# Patient Record
Sex: MALE | Race: WHITE | NOT HISPANIC OR LATINO | URBAN - METROPOLITAN AREA
[De-identification: names, ages, dates, MRNs, and addresses within clinical notes are randomized per-mention and may not be internally consistent; named-entity substitution may affect disease eponyms.]

---

## 2021-12-16 ENCOUNTER — EMERGENCY (EMERGENCY)
Facility: HOSPITAL | Age: 86
LOS: 0 days | Discharge: HOME | End: 2021-12-17
Attending: EMERGENCY MEDICINE | Admitting: EMERGENCY MEDICINE
Payer: MEDICARE

## 2021-12-16 VITALS
TEMPERATURE: 98 F | DIASTOLIC BLOOD PRESSURE: 60 MMHG | SYSTOLIC BLOOD PRESSURE: 119 MMHG | RESPIRATION RATE: 19 BRPM | OXYGEN SATURATION: 97 % | HEART RATE: 64 BPM

## 2021-12-16 DIAGNOSIS — M54.50 LOW BACK PAIN, UNSPECIFIED: ICD-10-CM

## 2021-12-16 DIAGNOSIS — N40.0 BENIGN PROSTATIC HYPERPLASIA WITHOUT LOWER URINARY TRACT SYMPTOMS: ICD-10-CM
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
## 2021-12-16 DIAGNOSIS — G30.9 ALZHEIMER'S DISEASE, UNSPECIFIED: ICD-10-CM

## 2021-12-16 DIAGNOSIS — F02.80 DEMENTIA IN OTHER DISEASES CLASSIFIED ELSEWHERE, UNSPECIFIED SEVERITY, WITHOUT BEHAVIORAL DISTURBANCE, PSYCHOTIC DISTURBANCE, MOOD DISTURBANCE, AND ANXIETY: ICD-10-CM
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
## 2021-12-16 LAB
ALBUMIN SERPL ELPH-MCNC: 3.9 G/DL — SIGNIFICANT CHANGE UP (ref 3.5–5.2)
ALP SERPL-CCNC: 63 U/L — SIGNIFICANT CHANGE UP (ref 30–115)
ALT FLD-CCNC: 10 U/L — SIGNIFICANT CHANGE UP (ref 0–41)
ANION GAP SERPL CALC-SCNC: 9 MMOL/L — SIGNIFICANT CHANGE UP (ref 7–14)
AST SERPL-CCNC: 15 U/L — SIGNIFICANT CHANGE UP (ref 0–41)
BASOPHILS # BLD AUTO: 0.02 K/UL — SIGNIFICANT CHANGE UP (ref 0–0.2)
BASOPHILS NFR BLD AUTO: 0.3 % — SIGNIFICANT CHANGE UP (ref 0–1)
BILIRUB SERPL-MCNC: <0.2 MG/DL — SIGNIFICANT CHANGE UP (ref 0.2–1.2)
BUN SERPL-MCNC: 25 MG/DL — HIGH (ref 10–20)
CALCIUM SERPL-MCNC: 8.7 MG/DL — SIGNIFICANT CHANGE UP (ref 8.5–10.1)
CHLORIDE SERPL-SCNC: 106 MMOL/L — SIGNIFICANT CHANGE UP (ref 98–110)
CO2 SERPL-SCNC: 20 MMOL/L — SIGNIFICANT CHANGE UP (ref 17–32)
CREAT SERPL-MCNC: 1 MG/DL — SIGNIFICANT CHANGE UP (ref 0.7–1.5)
EOSINOPHIL # BLD AUTO: 0.34 K/UL — SIGNIFICANT CHANGE UP (ref 0–0.7)
EOSINOPHIL NFR BLD AUTO: 5.1 % — SIGNIFICANT CHANGE UP (ref 0–8)
GLUCOSE SERPL-MCNC: 110 MG/DL — HIGH (ref 70–99)
HCT VFR BLD CALC: 32 % — LOW (ref 42–52)
HGB BLD-MCNC: 10.4 G/DL — LOW (ref 14–18)
IMM GRANULOCYTES NFR BLD AUTO: 0.2 % — SIGNIFICANT CHANGE UP (ref 0.1–0.3)
LYMPHOCYTES # BLD AUTO: 1.83 K/UL — SIGNIFICANT CHANGE UP (ref 1.2–3.4)
LYMPHOCYTES # BLD AUTO: 27.6 % — SIGNIFICANT CHANGE UP (ref 20.5–51.1)
MCHC RBC-ENTMCNC: 30.7 PG — SIGNIFICANT CHANGE UP (ref 27–31)
MCHC RBC-ENTMCNC: 32.5 G/DL — SIGNIFICANT CHANGE UP (ref 32–37)
MCV RBC AUTO: 94.4 FL — HIGH (ref 80–94)
MONOCYTES # BLD AUTO: 0.83 K/UL — HIGH (ref 0.1–0.6)
MONOCYTES NFR BLD AUTO: 12.5 % — HIGH (ref 1.7–9.3)
NEUTROPHILS # BLD AUTO: 3.6 K/UL — SIGNIFICANT CHANGE UP (ref 1.4–6.5)
NEUTROPHILS NFR BLD AUTO: 54.3 % — SIGNIFICANT CHANGE UP (ref 42.2–75.2)
NRBC # BLD: 0 /100 WBCS — SIGNIFICANT CHANGE UP (ref 0–0)
PLATELET # BLD AUTO: 176 K/UL — SIGNIFICANT CHANGE UP (ref 130–400)
POTASSIUM SERPL-MCNC: 3.9 MMOL/L — SIGNIFICANT CHANGE UP (ref 3.5–5)
POTASSIUM SERPL-SCNC: 3.9 MMOL/L — SIGNIFICANT CHANGE UP (ref 3.5–5)
PROT SERPL-MCNC: 6.3 G/DL — SIGNIFICANT CHANGE UP (ref 6–8)
RBC # BLD: 3.39 M/UL — LOW (ref 4.7–6.1)
RBC # FLD: 12.8 % — SIGNIFICANT CHANGE UP (ref 11.5–14.5)
SODIUM SERPL-SCNC: 135 MMOL/L — SIGNIFICANT CHANGE UP (ref 135–146)
WBC # BLD: 6.63 K/UL — SIGNIFICANT CHANGE UP (ref 4.8–10.8)
WBC # FLD AUTO: 6.63 K/UL — SIGNIFICANT CHANGE UP (ref 4.8–10.8)

## 2021-12-16 PROCEDURE — 99284 EMERGENCY DEPT VISIT MOD MDM: CPT

## 2021-12-16 PROCEDURE — 93010 ELECTROCARDIOGRAM REPORT: CPT

## 2021-12-16 PROCEDURE — 72170 X-RAY EXAM OF PELVIS: CPT | Mod: 26

## 2021-12-16 PROCEDURE — 71045 X-RAY EXAM CHEST 1 VIEW: CPT | Mod: 26

## 2021-12-16 NOTE — ED ADULT NURSE NOTE - OBJECTIVE STATEMENT
pt presents with daughter, pmhx alzheimers with more confusion in last 24 hrs and bph. daughter reports unwitnessed fall per dads report, found outside. pt complaining of back pain.

## 2021-12-16 NOTE — ED ADULT TRIAGE NOTE - BP NONINVASIVE DIASTOLIC (MM HG)
Palliative Care Progress Note Patient: Terell Calhoun MRN: [id number]  SSN: [id number] YOB: 1944  Age: 76 y.o. Sex: male Assessment/Plan: Chief Complaint/Interval History: pt alert with some confusion. Appears comfortable this am 
 
Principal Diagnosis: · Altered Mental Status R41.82 Additional Diagnoses: · Debility, Unspecified  R53.81 
· Delirium  F05 · Counseling, Encounter for Medical Advice  Z71.9 
· Encounter for Palliative Care  Z51.5 Palliative Performance Scale (PPS) PPS: 30 Medical Decision Making:  
Reviewed and summarized labs and imaging. Met with pt son and spouse at bedside. Reviewed current medical situation and plans to begin treatment for myeloma. Family decided towards DNR yesterday. They understand severity of situation and are relying on their ulisses and family for support. They remain hopeful that pt will improve. I discussed case with  who will follow up with family as well Will discuss findings with members of the interdisciplinary team.   
 
More than 50% of this 25 minute visit was spent counseling and coordination of care as outlined above. Subjective:  
 
Review of Systems negative with the exception of as noted above Objective:  
 
Visit Vitals BP 97/56 Pulse 79 Temp 97.4 °F (36.3 °C) Resp 16 Ht 6' (1.829 m) Wt 207 lb (93.9 kg) SpO2 98% BMI 28.07 kg/m² Physical Exam: 
 
General:  No acute distress. Eyes:  Conjunctivae/corneas clear Nose: Nares normal. Septum midline. Neck: Supple, symmetrical, trachea midline, no JVD Lungs:   Clear to auscultation bilaterally, unlabored Heart:  Regular rate and rhythm, no murmur Abdomen:   Soft, non-tender, non-distended Extremities: Normal, atraumatic, no cyanosis or edema Skin: Skin color, texture, turgor normal. No rash or lesions. Neurologic: Nonfocal  
Psych: Resting. Has been confused some but more alert Signed By: Katelyn Borja MD   
 October 23, 2019 60

## 2021-12-17 VITALS — HEART RATE: 55 BPM | DIASTOLIC BLOOD PRESSURE: 74 MMHG | SYSTOLIC BLOOD PRESSURE: 116 MMHG

## 2021-12-17 PROCEDURE — 70450 CT HEAD/BRAIN W/O DYE: CPT | Mod: 26,MA

## 2021-12-17 PROCEDURE — 72125 CT NECK SPINE W/O DYE: CPT | Mod: 26,MA

## 2021-12-17 PROCEDURE — 71260 CT THORAX DX C+: CPT | Mod: 26,MA

## 2021-12-17 PROCEDURE — 74177 CT ABD & PELVIS W/CONTRAST: CPT | Mod: 26,MA

## 2021-12-17 RX ORDER — ACETAMINOPHEN 500 MG
650 TABLET ORAL ONCE
Refills: 0 | Status: COMPLETED | OUTPATIENT
Start: 2021-12-17 | End: 2021-12-17

## 2021-12-17 NOTE — ED PROVIDER NOTE - NS ED ROS FT
Review of Systems    Constitutional: (-) fever   Eyes/ENT: (-) vision changes  Cardiovascular: (-) chest pain, (-) syncope (-) palpitations  Respiratory: (-) cough, (-) shortness of breath  Gastrointestinal: (-) vomiting, (-) diarrhea (-) abdominal pain  Genitourinary:  (-) dysuria   Musculoskeletal: (-) neck pain, (+) back pain, (-) leg pain/swelling  Integumentary: (-) rash, (-) edema  Neurological: (-) headache  Hematologic: (-) easy bruising

## 2021-12-17 NOTE — ED PROVIDER NOTE - NSFOLLOWUPINSTRUCTIONS_ED_ALL_ED_FT
Please follow up with your PCP in 1-3 days if you continue to have back pain.  Return to the ED for severe pain or inability to urinate or back pain + fever.    Back Pain    Back pain is very common in adults. The cause of back pain is rarely dangerous and the pain often gets better over time. The cause of your back pain may not be known and may include strain of muscles or ligaments, degeneration of the spinal disks, or arthritis. Occasionally the pain may radiate down your leg(s). Over-the-counter medicines to reduce pain and inflammation are often the most helpful. Stretching and remaining active frequently helps the healing process.     SEEK IMMEDIATE MEDICAL CARE IF YOU HAVE ANY OF THE FOLLOWING SYMPTOMS: bowel or bladder control problems, unusual weakness or numbness in your arms or legs, nausea or vomiting, abdominal pain, fever, dizziness/lightheadedness.

## 2021-12-17 NOTE — ED PROVIDER NOTE - PATIENT PORTAL LINK FT
You can access the FollowMyHealth Patient Portal offered by [facility] by registering at the following website: [email or web address]. By joining [employer]’s FollowMyHealth portal, you will also be able to view your health information using other applications (apps) compatible with our system.

## 2021-12-17 NOTE — ED PROVIDER NOTE - OBJECTIVE STATEMENT
87 y/o M with PMH BPH, alzheimer's presents with daughter for mild constant throbbing L lower back pain x hrs. +worse with movement, better with rest.   typically ambulates with a cane,  tonight left house, unattended, which daughter immediately saw on camera and he came right back in, and so she was concerned that when he was outside for those few minutes he fell because he was complaining of back pain.  denies saddle anesthesia, bowel/bladder dysfunction, difficulty ambulating, paraesthesias, direct trauma, hx weakness, fever, urinary sxs.

## 2021-12-17 NOTE — ED PROVIDER NOTE - PHYSICAL EXAMINATION
PHYSICAL EXAM:    GENERAL: Alert, appears stated age, well appearing, non-toxic  SKIN: Warm, pink and dry. MMM.   HEAD: NC, AT, no step offs   EYE: Normal lids/conjunctiva  ENT: Normal hearing, patent oropharynx  NECK: +supple. No meningismus, or JVD, +Trachea midline. no tenderness/step offs.   Pulm: Bilateral BS, normal resp effort, no wheezes, stridor, or retractions  CV: RRR, no M/R/G, 2+and = radial pulses  Abd: soft, non-tender, non-distended  Mskel: no erythema, cyanosis, edema. no calf tenderness. +from of b/l UE/le. no spinal TTP. +L paralumbar TTP.   Neuro: AAOx2, which is his baseline. normal gait.

## 2021-12-17 NOTE — ED PROVIDER NOTE - CLINICAL SUMMARY MEDICAL DECISION MAKING FREE TEXT BOX
86yM Alzheimer's p/w back pain x 1d with ?hx unwitnessed fall.  Exam w/o focal neuro deficits or signs of traumatic injury.  Labs reassuring.  Imaging w/o acute traumatic injury nor explanation for his back pain.  Recommend supportive care, urgent o/p pcp f/u, return precautions.

## 2021-12-17 NOTE — ED PROVIDER NOTE - ATTENDING CONTRIBUTION TO CARE
86yM Alzheimer's p/w back pain - pt wandered briefly out of the house and later reported unwitnessed fall.  He continues to complain of low back pain this afternoon.  No fevers, IVDU, saddle anesthesia or urinary retention.

## 2021-12-17 NOTE — ED PROVIDER NOTE - PROGRESS NOTE DETAILS
CHASITY TIRADO: Reviewed all results and necessity for follow up. Counseled on red flags and to return for them.  Patient appears well on discharge.

## 2022-05-19 NOTE — ED PROVIDER NOTE - CCCP TRG CHIEF CMPLNT
Placed future lab orders    FAITH Crawford    Dear Dr. Bowling;    Your ferritin is coming up but still low normal. I would stay on iron for now and we can recheck in about 3 months. I placed future lab orders today.    FAITH Crawford    
fall

## 2022-12-28 ENCOUNTER — INPATIENT (INPATIENT)
Facility: HOSPITAL | Age: 87
LOS: 11 days | Discharge: HOME | End: 2023-01-09
Attending: INTERNAL MEDICINE | Admitting: INTERNAL MEDICINE
Payer: MEDICARE

## 2022-12-28 VITALS
WEIGHT: 203.93 LBS | HEART RATE: 99 BPM | DIASTOLIC BLOOD PRESSURE: 84 MMHG | TEMPERATURE: 97 F | HEIGHT: 70 IN | SYSTOLIC BLOOD PRESSURE: 121 MMHG | OXYGEN SATURATION: 95 % | RESPIRATION RATE: 18 BRPM

## 2022-12-28 DIAGNOSIS — Z98.890 OTHER SPECIFIED POSTPROCEDURAL STATES: Chronic | ICD-10-CM

## 2022-12-28 DIAGNOSIS — Z98.49 CATARACT EXTRACTION STATUS, UNSPECIFIED EYE: Chronic | ICD-10-CM

## 2022-12-28 LAB
ALBUMIN SERPL ELPH-MCNC: 3.8 G/DL — SIGNIFICANT CHANGE UP (ref 3.5–5.2)
ALP SERPL-CCNC: 69 U/L — SIGNIFICANT CHANGE UP (ref 30–115)
ALT FLD-CCNC: 28 U/L — SIGNIFICANT CHANGE UP (ref 0–41)
ANION GAP SERPL CALC-SCNC: 13 MMOL/L — SIGNIFICANT CHANGE UP (ref 7–14)
APPEARANCE UR: CLEAR — SIGNIFICANT CHANGE UP
APTT BLD: 29.8 SEC — SIGNIFICANT CHANGE UP (ref 27–39.2)
AST SERPL-CCNC: 26 U/L — SIGNIFICANT CHANGE UP (ref 0–41)
BACTERIA # UR AUTO: ABNORMAL
BASOPHILS # BLD AUTO: 0.02 K/UL — SIGNIFICANT CHANGE UP (ref 0–0.2)
BASOPHILS NFR BLD AUTO: 0.3 % — SIGNIFICANT CHANGE UP (ref 0–1)
BILIRUB SERPL-MCNC: 1 MG/DL — SIGNIFICANT CHANGE UP (ref 0.2–1.2)
BILIRUB UR-MCNC: ABNORMAL
BUN SERPL-MCNC: 27 MG/DL — HIGH (ref 10–20)
CALCIUM SERPL-MCNC: 9.1 MG/DL — SIGNIFICANT CHANGE UP (ref 8.4–10.5)
CHLORIDE SERPL-SCNC: 106 MMOL/L — SIGNIFICANT CHANGE UP (ref 98–110)
CO2 SERPL-SCNC: 21 MMOL/L — SIGNIFICANT CHANGE UP (ref 17–32)
COD CRY URNS QL: ABNORMAL
COLOR SPEC: YELLOW — SIGNIFICANT CHANGE UP
CREAT SERPL-MCNC: 1.3 MG/DL — SIGNIFICANT CHANGE UP (ref 0.7–1.5)
DIFF PNL FLD: ABNORMAL
EGFR: 53 ML/MIN/1.73M2 — LOW
EOSINOPHIL # BLD AUTO: 0.18 K/UL — SIGNIFICANT CHANGE UP (ref 0–0.7)
EOSINOPHIL NFR BLD AUTO: 2.6 % — SIGNIFICANT CHANGE UP (ref 0–8)
EPI CELLS # UR: ABNORMAL /HPF
GLUCOSE SERPL-MCNC: 103 MG/DL — HIGH (ref 70–99)
GLUCOSE UR QL: NEGATIVE MG/DL — SIGNIFICANT CHANGE UP
HCT VFR BLD CALC: 35.7 % — LOW (ref 42–52)
HGB BLD-MCNC: 11.9 G/DL — LOW (ref 14–18)
IMM GRANULOCYTES NFR BLD AUTO: 0.3 % — SIGNIFICANT CHANGE UP (ref 0.1–0.3)
INR BLD: 1.3 RATIO — SIGNIFICANT CHANGE UP (ref 0.65–1.3)
KETONES UR-MCNC: 15
LEUKOCYTE ESTERASE UR-ACNC: NEGATIVE — SIGNIFICANT CHANGE UP
LYMPHOCYTES # BLD AUTO: 1.46 K/UL — SIGNIFICANT CHANGE UP (ref 1.2–3.4)
LYMPHOCYTES # BLD AUTO: 21.3 % — SIGNIFICANT CHANGE UP (ref 20.5–51.1)
MAGNESIUM SERPL-MCNC: 1.9 MG/DL — SIGNIFICANT CHANGE UP (ref 1.8–2.4)
MCHC RBC-ENTMCNC: 30.2 PG — SIGNIFICANT CHANGE UP (ref 27–31)
MCHC RBC-ENTMCNC: 33.3 G/DL — SIGNIFICANT CHANGE UP (ref 32–37)
MCV RBC AUTO: 90.6 FL — SIGNIFICANT CHANGE UP (ref 80–94)
MONOCYTES # BLD AUTO: 0.8 K/UL — HIGH (ref 0.1–0.6)
MONOCYTES NFR BLD AUTO: 11.7 % — HIGH (ref 1.7–9.3)
NEUTROPHILS # BLD AUTO: 4.37 K/UL — SIGNIFICANT CHANGE UP (ref 1.4–6.5)
NEUTROPHILS NFR BLD AUTO: 63.8 % — SIGNIFICANT CHANGE UP (ref 42.2–75.2)
NITRITE UR-MCNC: NEGATIVE — SIGNIFICANT CHANGE UP
NRBC # BLD: 0 /100 WBCS — SIGNIFICANT CHANGE UP (ref 0–0)
NT-PROBNP SERPL-SCNC: 5407 PG/ML — HIGH (ref 0–300)
PH UR: 5 — SIGNIFICANT CHANGE UP (ref 5–8)
PLATELET # BLD AUTO: 176 K/UL — SIGNIFICANT CHANGE UP (ref 130–400)
POTASSIUM SERPL-MCNC: 4.1 MMOL/L — SIGNIFICANT CHANGE UP (ref 3.5–5)
POTASSIUM SERPL-SCNC: 4.1 MMOL/L — SIGNIFICANT CHANGE UP (ref 3.5–5)
PROT SERPL-MCNC: 6.9 G/DL — SIGNIFICANT CHANGE UP (ref 6–8)
PROT UR-MCNC: NEGATIVE MG/DL — SIGNIFICANT CHANGE UP
PROTHROM AB SERPL-ACNC: 14.9 SEC — HIGH (ref 9.95–12.87)
RBC # BLD: 3.94 M/UL — LOW (ref 4.7–6.1)
RBC # FLD: 13.8 % — SIGNIFICANT CHANGE UP (ref 11.5–14.5)
RBC CASTS # UR COMP ASSIST: ABNORMAL /HPF
SARS-COV-2 RNA SPEC QL NAA+PROBE: SIGNIFICANT CHANGE UP
SODIUM SERPL-SCNC: 140 MMOL/L — SIGNIFICANT CHANGE UP (ref 135–146)
SP GR SPEC: >=1.03 (ref 1.01–1.03)
TROPONIN T SERPL-MCNC: 0.01 NG/ML — SIGNIFICANT CHANGE UP
UROBILINOGEN FLD QL: 1 MG/DL
WBC # BLD: 6.85 K/UL — SIGNIFICANT CHANGE UP (ref 4.8–10.8)
WBC # FLD AUTO: 6.85 K/UL — SIGNIFICANT CHANGE UP (ref 4.8–10.8)
WBC UR QL: SIGNIFICANT CHANGE UP /HPF

## 2022-12-28 PROCEDURE — 99222 1ST HOSP IP/OBS MODERATE 55: CPT | Mod: AI

## 2022-12-28 PROCEDURE — 71045 X-RAY EXAM CHEST 1 VIEW: CPT | Mod: 26

## 2022-12-28 PROCEDURE — 70450 CT HEAD/BRAIN W/O DYE: CPT | Mod: 26,MA

## 2022-12-28 PROCEDURE — 93010 ELECTROCARDIOGRAM REPORT: CPT

## 2022-12-28 PROCEDURE — 99285 EMERGENCY DEPT VISIT HI MDM: CPT | Mod: FS

## 2022-12-28 PROCEDURE — 76770 US EXAM ABDO BACK WALL COMP: CPT | Mod: 26

## 2022-12-28 RX ORDER — TAMSULOSIN HYDROCHLORIDE 0.4 MG/1
0.4 CAPSULE ORAL AT BEDTIME
Refills: 0 | Status: DISCONTINUED | OUTPATIENT
Start: 2022-12-28 | End: 2022-12-29

## 2022-12-28 RX ORDER — MEMANTINE HYDROCHLORIDE 10 MG/1
10 TABLET ORAL
Refills: 0 | Status: DISCONTINUED | OUTPATIENT
Start: 2022-12-28 | End: 2023-01-09

## 2022-12-28 RX ORDER — ACETAMINOPHEN 500 MG
650 TABLET ORAL EVERY 6 HOURS
Refills: 0 | Status: DISCONTINUED | OUTPATIENT
Start: 2022-12-28 | End: 2023-01-09

## 2022-12-28 RX ORDER — QUETIAPINE FUMARATE 200 MG/1
50 TABLET, FILM COATED ORAL
Refills: 0 | Status: DISCONTINUED | OUTPATIENT
Start: 2022-12-28 | End: 2022-12-29

## 2022-12-28 RX ORDER — FUROSEMIDE 40 MG
20 TABLET ORAL DAILY
Refills: 0 | Status: DISCONTINUED | OUTPATIENT
Start: 2022-12-28 | End: 2023-01-02

## 2022-12-28 RX ORDER — ONDANSETRON 8 MG/1
4 TABLET, FILM COATED ORAL EVERY 8 HOURS
Refills: 0 | Status: DISCONTINUED | OUTPATIENT
Start: 2022-12-28 | End: 2023-01-09

## 2022-12-28 RX ORDER — ENOXAPARIN SODIUM 100 MG/ML
40 INJECTION SUBCUTANEOUS EVERY 12 HOURS
Refills: 0 | Status: DISCONTINUED | OUTPATIENT
Start: 2022-12-28 | End: 2022-12-29

## 2022-12-28 RX ORDER — FINASTERIDE 5 MG/1
5 TABLET, FILM COATED ORAL DAILY
Refills: 0 | Status: DISCONTINUED | OUTPATIENT
Start: 2022-12-28 | End: 2023-01-09

## 2022-12-28 RX ORDER — LATANOPROST 0.05 MG/ML
1 SOLUTION/ DROPS OPHTHALMIC; TOPICAL AT BEDTIME
Refills: 0 | Status: DISCONTINUED | OUTPATIENT
Start: 2022-12-28 | End: 2023-01-09

## 2022-12-28 RX ORDER — ENOXAPARIN SODIUM 100 MG/ML
100 INJECTION SUBCUTANEOUS ONCE
Refills: 0 | Status: COMPLETED | OUTPATIENT
Start: 2022-12-28 | End: 2022-12-28

## 2022-12-28 RX ORDER — LANOLIN ALCOHOL/MO/W.PET/CERES
3 CREAM (GRAM) TOPICAL AT BEDTIME
Refills: 0 | Status: DISCONTINUED | OUTPATIENT
Start: 2022-12-28 | End: 2023-01-09

## 2022-12-28 RX ADMIN — ENOXAPARIN SODIUM 100 MILLIGRAM(S): 100 INJECTION SUBCUTANEOUS at 21:52

## 2022-12-28 RX ADMIN — TAMSULOSIN HYDROCHLORIDE 0.4 MILLIGRAM(S): 0.4 CAPSULE ORAL at 22:13

## 2022-12-28 RX ADMIN — LATANOPROST 1 DROP(S): 0.05 SOLUTION/ DROPS OPHTHALMIC; TOPICAL at 22:10

## 2022-12-28 RX ADMIN — MEMANTINE HYDROCHLORIDE 10 MILLIGRAM(S): 10 TABLET ORAL at 21:52

## 2022-12-28 NOTE — ED PROVIDER NOTE - PHYSICAL EXAMINATION
Physical Exam    Vital Signs: I have reviewed the initial vital signs.  Constitutional: appears stated age, no acute distress  Eyes: Conjunctiva pink, Sclera clear  Cardiovascular: S1 and S2, tachycardia, irregular rhythm, well-perfused extremities, radial pulses equal and 2+, pedal pulses 2+ and equal  Respiratory: unlabored respiratory effort, clear to auscultation bilaterally no wheezing, rales and rhonchi  Gastrointestinal: soft, non-tender abdomen, no pulsatile mass, normal bowl sounds  Musculoskeletal: supple neck, no lower extremity edema, no midline tenderness  Integumentary: warm, dry, no rash  Neurologic: awake, alert, nvi

## 2022-12-28 NOTE — ED PROVIDER NOTE - NS ED ATTENDING STATEMENT MOD
This was a shared visit with the [facility]. I reviewed and verified the documentation and independently performed the documented:

## 2022-12-28 NOTE — ED ADULT NURSE NOTE - NSICDXPASTMEDICALHX_GEN_ALL_CORE_FT
PAST MEDICAL HISTORY:  Alzheimer disease      PAST MEDICAL HISTORY:  Alzheimer disease     Enlarged prostate     Glaucoma     Hyperlipidemia

## 2022-12-28 NOTE — H&P ADULT - NSHPPHYSICALEXAM_GEN_ALL_CORE
GENERAL:  86y/o Male NAD, resting comfortably.  HEAD:  Atraumatic, Normocephalic  EYES: EOMI, PERRLA, conjunctiva and sclera clear  NECK: Supple, No JVD, no cervical lymphadenopathy, non-tender  CHEST/LUNG: Clear to auscultation bilaterally; No wheeze, rhonchi, or rales  HEART: Regular rate and rhythm; S1&S2  ABDOMEN: Soft, Nontender, Nondistended x 4 quadrants; Bowel sounds present  EXTREMITIES:   Peripheral Pulses Present, No clubbing, no cyanosis. edema, no calf tenderness  PSYCH: Alert, cooperative, appropriate  NEUROLOGY: WNL  SKIN: WNL GENERAL:  88y/o Male NAD, resting comfortably.  HEAD:  Atraumatic, Normocephalic  EYES: EOMI, PERRLA, conjunctiva and sclera clear  NECK: Supple, No JVD, no cervical lymphadenopathy, non-tender  CHEST/LUNG: Clear to auscultation bilaterally; No wheeze, rhonchi, or rales  HEART: irregularly irregular rate and rhythm, S1&S2 present  ABDOMEN: Soft, Nontender, Nondistended x 4 quadrants; Bowel sounds present  EXTREMITIES:   Peripheral Pulses Present, No clubbing, no cyanosis. edema, no calf tenderness  PSYCH: Alert, cooperative, appropriate  NEUROLOGY: A&O X0, confused however alert.   SKIN: WNL

## 2022-12-28 NOTE — H&P ADULT - ASSESSMENT
87-year-old male accompanied by his daughter c/o poor po intake, low urine output and leg swelling. Pt with a past medical history of Alzheimer's disease, BPH, hyperlipidemia, presents emerged department for weakness.  In addition  daughter states he  had a fall today hit back of head, also notes worsening lower extremity edema.  Daughter states patient has worsening weakness today.      DX new onset of Afib:  tele  cardiology  anti coag/ lovenox Q 12  CE  AM EKG     R/O CHF vs transient leg swelling  lasix    urinary retention:  straight cath    continue home meds    Prophylaxis Gi/VTE 87-year-old male accompanied by his daughter c/o poor po intake, low urine output and leg swelling. Pt with a past medical history of Alzheimer's disease, BPH, hyperlipidemia, presents emerged department for weakness.  In addition  daughter states he  had a fall today hit back of head, also notes worsening lower extremity edema.  Daughter states patient has worsening weakness today.      #new onset of Afib:  tele  [id number]: 2   anti coag/ lovenox Q 12  Cardiology consult pending  f/u TSH  CE  AM EKG     #R/O CHF vs transient leg swelling  lasix  f/u echo    #Hx of BPH  #hx of urinary retention  cont flomax   bladder US does not show retention  measure input and output      continue home meds      Daughter who is the health care proxy requests pt to be DNR/DNI with limited intervention   Daughter agrees with anticoagulation, risks were discussed at length including complications with bleeding especially with hx of falls, daughter agrees with anticoagulation and verbalized understanding of the risks.      Prophylaxis Gi/VTE 87-year-old male accompanied by his daughter c/o poor po intake, low urine output and leg swelling. Pt with a past medical history of Alzheimer's disease, BPH, hyperlipidemia, presents emerged department for weakness.  In addition  daughter states he  had a fall today hit back of head, also notes worsening lower extremity edema.  Daughter states patient has worsening weakness today.      #new onset of Afib:  tele  [id number]: 2   anti coag/ lovenox Q 12  Cardiology consult pending  f/u TSH  CE  AM EKG    #Debility possibly secondary to worsening dementia  #frequent falls  f/u B12, TSH, RPR, Ammonia   PT  cont Namenda      #R/O CHF vs transient leg swelling  lasix  f/u echo    #Hx of BPH  #hx of urinary retention  cont flomax   bladder US does not show retention  measure input and output      continue home meds      Daughter who is the health care proxy requests pt to be DNR/DNI with limited intervention   Daughter agrees with anticoagulation, risks were discussed at length including complications with bleeding especially with hx of falls, daughter agrees with anticoagulation and verbalized understanding of the risks.      Prophylaxis Gi/VTE 87-year-old male accompanied by his daughter c/o poor po intake, low urine output and leg swelling. Pt with a past medical history of Alzheimer's disease, BPH, hyperlipidemia, presents emerged department for weakness.  In addition  daughter states he  had a fall today hit back of head, also notes worsening lower extremity edema.  Daughter states patient has worsening weakness today.      #new onset of Afib:  tele  [id number]: 2   anti coag/ lovenox Q 12  Cardiology consult pending  f/u TSH  CE  AM EKG  start metoprolol 25mg BID and adjust as needed     #Debility possibly secondary to worsening dementia  #frequent falls  f/u B12, TSH, RPR, Ammonia   PT  cont Namenda   fall risk protocol      #R/O CHF vs transient leg swelling  lasix  f/u echo    #Hx of BPH  #hx of urinary retention  cont flomax   bladder US does not show retention  measure input and output      continue home meds      Daughter who is the health care proxy requests pt to be DNR/DNI with limited intervention   Daughter agrees with anticoagulation, risks were discussed at length including complications with bleeding especially with hx of falls, daughter agrees with anticoagulation and verbalized understanding of the risks.      Prophylaxis Gi/VTE 87-year-old male accompanied by his daughter c/o poor po intake, low urine output and leg swelling. Pt with a past medical history of Alzheimer's disease, BPH, hyperlipidemia, presents emerged department for weakness.  In addition  daughter states he  had a fall today hit back of head, also notes worsening lower extremity edema.  Daughter states patient has worsening weakness today.      #new onset of Afib:  tele  [id number]: 2   anti coag/ lovenox Q 12  Cardiology consult pending  f/u TSH  CE  AM EKG  start metoprolol 25mg BID and adjust as needed     #Debility possibly secondary to worsening dementia  #frequent falls  f/u B12, TSH, RPR, Ammonia   PT  cont Namenda   fall risk protocol      #R/O CHF vs transient leg swelling  lasix  f/u echo    #Hx of BPH  #hx of urinary retention  cont flomax / finasteride   bladder US does not show retention  measure input and output      continue home meds      Daughter who is the health care proxy requests pt to be DNR/DNI with limited intervention   Daughter agrees with anticoagulation, risks were discussed at length including complications with bleeding especially with hx of falls, daughter agrees with anticoagulation and verbalized understanding of the risks.      Prophylaxis Gi/VTE

## 2022-12-28 NOTE — ED PROVIDER NOTE - OBJECTIVE STATEMENT
87-year-old male, past medical history of Alzheimer's disease, BPH, hyperlipidemia, presents emerged department for weakness.  Per daughter had a fall today hit back of head, also notes worsening lower extremity edema.  Daughter states patient has been more weak and is seeking higher level of care recently.

## 2022-12-28 NOTE — ED PROVIDER NOTE - CLINICAL SUMMARY MEDICAL DECISION MAKING FREE TEXT BOX
87-year-old male with history of Alzheimer's dementia, hyperlipidemia presents to the ER for gradually worsening diffuse weakness, lower extremity edema to bilateral legs and mechanical fall.  His daughter reports that he fell onto his head, denies anticoagulation, LOC and has been mentating at baseline.  Denies chest pain, shortness of breath, fevers, chills, vomiting, diarrhea, bloody or melanotic stool.  Vitals noted, triage note reviewed.  EKG shows A. fib with RVR (rate 110s).  Labs and imaging personally reviewed.  [id number] of 2.  Patient was given anticoagulation and admitted for further management and supportive care.  Patient and family updated the bedside.

## 2022-12-28 NOTE — H&P ADULT - NSHPLABSRESULTS_GEN_ALL_CORE
ICU Vital Signs Last 24 Hrs  T(C): 35.7 (28 Dec 2022 19:16), Max: 36.1 (28 Dec 2022 15:07)  T(F): 96.2 (28 Dec 2022 19:16), Max: 96.9 (28 Dec 2022 15:07)  HR: 98 (28 Dec 2022 19:16) (98 - 99)  BP: 114/89 (28 Dec 2022 19:16) (114/89 - 121/84)  BP(mean): --  ABP: --  ABP(mean): --  RR: 18 (28 Dec 2022 19:16) (18 - 18)  SpO2: 96% (28 Dec 2022 19:16) (95% - 96%)    O2 Parameters below as of 28 Dec 2022 19:16  Patient On (Oxygen Delivery Method): room air                                11.9   6.85  )-----------( 176      ( 28 Dec 2022 16:25 )             35.7       12-28    140  |  106  |  27<H>  ----------------------------<  103<H>  4.1   |  21  |  1.3    Ca    9.1      28 Dec 2022 16:25  Mg     1.9     12-28    TPro  6.9  /  Alb  3.8  /  TBili  1.0  /  DBili  x   /  AST  26  /  ALT  28  /  AlkPhos  69  12-28              Urinalysis Basic - ( 28 Dec 2022 16:36 )    Color: Yellow / Appearance: Clear / SG: >=1.030 / pH: x  Gluc: x / Ketone: 15  / Bili: Small / Urobili: 1.0 mg/dL   Blood: x / Protein: Negative mg/dL / Nitrite: Negative   Leuk Esterase: Negative / RBC: 3-5 /HPF / WBC 1-2 /HPF   Sq Epi: x / Non Sq Epi: Occasional /HPF / Bacteria: Few        PT/INR - ( 28 Dec 2022 19:32 )   PT: 14.90 sec;   INR: 1.30 ratio         PTT - ( 28 Dec 2022 19:32 )  PTT:29.8 sec    Lactate Trend      CARDIAC MARKERS ( 28 Dec 2022 16:25 )  x     / 0.01 ng/mL / x     / x     / x            CAPILLARY BLOOD GLUCOSE          ct head : no ICH, no acute finding.

## 2022-12-28 NOTE — ED PROVIDER NOTE - CARE PLAN
Principal Discharge DX:	Atrial fibrillation  Secondary Diagnosis:	Weakness  Secondary Diagnosis:	Swelling of lower extremity   1

## 2022-12-28 NOTE — H&P ADULT - HISTORY OF PRESENT ILLNESS
87-year-old male accompanied by his daughter c/o poor po intake, low urine output and leg swelling. Pt with a past medical history of Alzheimer's disease( DX 2018) Hperlipidemia, presents emerged department for weakness.  In addition  daughter states he  had a fall today hit back of head, also notes worsening lower extremity edema.  Daughter states patient has worsening weakness today. 87-year-old male accompanied by his daughter c/o poor po intake, low urine output and leg swelling. Pt with a past medical history of Alzheimer's disease( DX 2018) Hperlipidemia, presents emerged department for weakness.  In addition  daughter states he  had a fall today hit back of head, also notes worsening lower extremity edema. Daughter states he has had multiple falls in the recent past and has been increasingly confused and debilitated. Daughter denies any loss of consciousness. Daughter states patient has worsening weakness today and was concerned that he may have a UTI. In the ED, pt was found to have new onset afib, started on full dose lovenox currently rate controlled. 87-year-old male accompanied by his daughter c/o poor po intake, low urine output and leg swelling. Pt with a past medical history of Alzheimer's disease( DX 2018) Hperlipidemia, presents emerged department for weakness.  In addition  daughter states he  had a fall today hit back of head, also notes worsening lower extremity edema. Daughter states he has had multiple falls in the recent past and has been increasingly confused and debilitated. Daughter denies any loss of consciousness. Daughter states patient has worsening weakness today and was concerned that he may have a UTI. In the ED, pt was found to have new onset afib, EKG shows AFIB w/ RVR @113 BPM, started on full dose lovenox currently rate controlled.

## 2022-12-29 ENCOUNTER — TRANSCRIPTION ENCOUNTER (OUTPATIENT)
Age: 87
End: 2022-12-29

## 2022-12-29 LAB
ALBUMIN SERPL ELPH-MCNC: 3.7 G/DL — SIGNIFICANT CHANGE UP (ref 3.5–5.2)
ALP SERPL-CCNC: 66 U/L — SIGNIFICANT CHANGE UP (ref 30–115)
ALT FLD-CCNC: 25 U/L — SIGNIFICANT CHANGE UP (ref 0–41)
AMMONIA BLD-MCNC: 21 UMOL/L — SIGNIFICANT CHANGE UP (ref 11–55)
ANION GAP SERPL CALC-SCNC: 17 MMOL/L — HIGH (ref 7–14)
AST SERPL-CCNC: 32 U/L — SIGNIFICANT CHANGE UP (ref 0–41)
BILIRUB SERPL-MCNC: 0.9 MG/DL — SIGNIFICANT CHANGE UP (ref 0.2–1.2)
BUN SERPL-MCNC: 27 MG/DL — HIGH (ref 10–20)
CALCIUM SERPL-MCNC: 9.3 MG/DL — SIGNIFICANT CHANGE UP (ref 8.4–10.5)
CHLORIDE SERPL-SCNC: 107 MMOL/L — SIGNIFICANT CHANGE UP (ref 98–110)
CO2 SERPL-SCNC: 16 MMOL/L — LOW (ref 17–32)
CREAT SERPL-MCNC: 1.3 MG/DL — SIGNIFICANT CHANGE UP (ref 0.7–1.5)
CULTURE RESULTS: NO GROWTH — SIGNIFICANT CHANGE UP
EGFR: 53 ML/MIN/1.73M2 — LOW
GLUCOSE SERPL-MCNC: 103 MG/DL — HIGH (ref 70–99)
HCT VFR BLD CALC: 37.3 % — LOW (ref 42–52)
HGB BLD-MCNC: 11.9 G/DL — LOW (ref 14–18)
MCHC RBC-ENTMCNC: 29 PG — SIGNIFICANT CHANGE UP (ref 27–31)
MCHC RBC-ENTMCNC: 31.9 G/DL — LOW (ref 32–37)
MCV RBC AUTO: 90.8 FL — SIGNIFICANT CHANGE UP (ref 80–94)
NRBC # BLD: 0 /100 WBCS — SIGNIFICANT CHANGE UP (ref 0–0)
PLATELET # BLD AUTO: 156 K/UL — SIGNIFICANT CHANGE UP (ref 130–400)
POTASSIUM SERPL-MCNC: 4.6 MMOL/L — SIGNIFICANT CHANGE UP (ref 3.5–5)
POTASSIUM SERPL-SCNC: 4.6 MMOL/L — SIGNIFICANT CHANGE UP (ref 3.5–5)
PROT SERPL-MCNC: 6.8 G/DL — SIGNIFICANT CHANGE UP (ref 6–8)
RBC # BLD: 4.11 M/UL — LOW (ref 4.7–6.1)
RBC # FLD: 13.9 % — SIGNIFICANT CHANGE UP (ref 11.5–14.5)
SODIUM SERPL-SCNC: 140 MMOL/L — SIGNIFICANT CHANGE UP (ref 135–146)
SPECIMEN SOURCE: SIGNIFICANT CHANGE UP
T PALLIDUM AB TITR SER: NEGATIVE — SIGNIFICANT CHANGE UP
T PALLIDUM AB TITR SER: NEGATIVE — SIGNIFICANT CHANGE UP
TSH SERPL-MCNC: 5.04 UIU/ML — HIGH (ref 0.27–4.2)
VIT B12 SERPL-MCNC: 351 PG/ML — SIGNIFICANT CHANGE UP (ref 232–1245)
WBC # BLD: 5.82 K/UL — SIGNIFICANT CHANGE UP (ref 4.8–10.8)
WBC # FLD AUTO: 5.82 K/UL — SIGNIFICANT CHANGE UP (ref 4.8–10.8)

## 2022-12-29 PROCEDURE — 93010 ELECTROCARDIOGRAM REPORT: CPT

## 2022-12-29 PROCEDURE — 99222 1ST HOSP IP/OBS MODERATE 55: CPT

## 2022-12-29 PROCEDURE — 99232 SBSQ HOSP IP/OBS MODERATE 35: CPT

## 2022-12-29 RX ORDER — ENOXAPARIN SODIUM 100 MG/ML
100 INJECTION SUBCUTANEOUS EVERY 12 HOURS
Refills: 0 | Status: DISCONTINUED | OUTPATIENT
Start: 2022-12-29 | End: 2022-12-29

## 2022-12-29 RX ORDER — SILODOSIN 4 MG/1
8 CAPSULE ORAL DAILY
Refills: 0 | Status: DISCONTINUED | OUTPATIENT
Start: 2022-12-29 | End: 2023-01-09

## 2022-12-29 RX ORDER — APIXABAN 2.5 MG/1
1 TABLET, FILM COATED ORAL
Qty: 60 | Refills: 0
Start: 2022-12-29 | End: 2023-01-27

## 2022-12-29 RX ORDER — METOPROLOL TARTRATE 50 MG
25 TABLET ORAL
Refills: 0 | Status: DISCONTINUED | OUTPATIENT
Start: 2022-12-29 | End: 2022-12-30

## 2022-12-29 RX ORDER — MIRABEGRON 50 MG/1
50 TABLET, EXTENDED RELEASE ORAL DAILY
Refills: 0 | Status: DISCONTINUED | OUTPATIENT
Start: 2022-12-29 | End: 2023-01-09

## 2022-12-29 RX ORDER — QUETIAPINE FUMARATE 200 MG/1
50 TABLET, FILM COATED ORAL AT BEDTIME
Refills: 0 | Status: DISCONTINUED | OUTPATIENT
Start: 2022-12-30 | End: 2023-01-05

## 2022-12-29 RX ORDER — SENNA PLUS 8.6 MG/1
2 TABLET ORAL AT BEDTIME
Refills: 0 | Status: DISCONTINUED | OUTPATIENT
Start: 2022-12-29 | End: 2023-01-09

## 2022-12-29 RX ORDER — APIXABAN 2.5 MG/1
5 TABLET, FILM COATED ORAL
Refills: 0 | Status: DISCONTINUED | OUTPATIENT
Start: 2022-12-30 | End: 2023-01-09

## 2022-12-29 RX ADMIN — ENOXAPARIN SODIUM 100 MILLIGRAM(S): 100 INJECTION SUBCUTANEOUS at 05:56

## 2022-12-29 RX ADMIN — Medication 25 MILLIGRAM(S): at 17:37

## 2022-12-29 RX ADMIN — Medication 3 MILLIGRAM(S): at 21:57

## 2022-12-29 RX ADMIN — QUETIAPINE FUMARATE 50 MILLIGRAM(S): 200 TABLET, FILM COATED ORAL at 05:56

## 2022-12-29 RX ADMIN — MEMANTINE HYDROCHLORIDE 10 MILLIGRAM(S): 10 TABLET ORAL at 17:37

## 2022-12-29 RX ADMIN — Medication 25 MILLIGRAM(S): at 03:22

## 2022-12-29 RX ADMIN — SENNA PLUS 2 TABLET(S): 8.6 TABLET ORAL at 21:56

## 2022-12-29 RX ADMIN — LATANOPROST 1 DROP(S): 0.05 SOLUTION/ DROPS OPHTHALMIC; TOPICAL at 21:57

## 2022-12-29 RX ADMIN — Medication 20 MILLIGRAM(S): at 05:55

## 2022-12-29 RX ADMIN — FINASTERIDE 5 MILLIGRAM(S): 5 TABLET, FILM COATED ORAL at 13:03

## 2022-12-29 RX ADMIN — MEMANTINE HYDROCHLORIDE 10 MILLIGRAM(S): 10 TABLET ORAL at 05:55

## 2022-12-29 RX ADMIN — ENOXAPARIN SODIUM 100 MILLIGRAM(S): 100 INJECTION SUBCUTANEOUS at 17:48

## 2022-12-29 NOTE — DISCHARGE NOTE PROVIDER - NSDCMRMEDTOKEN_GEN_ALL_CORE_FT
apixaban 5 mg oral tablet: 1 tab(s) orally 2 times a day   finasteride 5 mg oral tablet: 1 tab(s) orally once a day  latanoprost 0.005% ophthalmic solution: 1 drop(s) to each affected eye once a day (in the evening)  memantine 10 mg oral tablet: 1 tab(s) orally 2 times a day  Myrbetriq 50 mg oral tablet, extended release: 1 tab(s) orally once a day  QUEtiapine 50 mg oral tablet: 1 tab(s) orally 2 times a day  silodosin 8 mg oral capsule: 1 cap(s) orally once a day   apixaban 5 mg oral tablet: 1 tab(s) orally 2 times a day   finasteride 5 mg oral tablet: 1 tab(s) orally once a day  furosemide 20 mg oral tablet: 1 tab(s) orally once a day  latanoprost 0.005% ophthalmic solution: 1 drop(s) to each affected eye once a day (in the evening)  memantine 10 mg oral tablet: 1 tab(s) orally 2 times a day  metoprolol tartrate 50 mg oral tablet: 1 tab(s) orally 2 times a day  Myrbetriq 50 mg oral tablet, extended release: 1 tab(s) orally once a day  QUEtiapine 25 mg oral tablet: 1 tab(s) orally once a day  sacubitril-valsartan 24 mg-26 mg oral tablet: 1 tab(s) orally 2 times a day  silodosin 8 mg oral capsule: 1 cap(s) orally once a day   apixaban 5 mg oral tablet: 1 tab(s) orally 2 times a day   finasteride 5 mg oral tablet: 1 tab(s) orally once a day  furosemide 20 mg oral tablet: 1 tab(s) orally once a day  latanoprost 0.005% ophthalmic solution: 1 drop(s) to each affected eye once a day (in the evening)  memantine 10 mg oral tablet: 1 tab(s) orally 2 times a day  metoprolol tartrate 50 mg oral tablet: 1 tab(s) orally 2 times a day  Myrbetriq 50 mg oral tablet, extended release: 1 tab(s) orally once a day  QUEtiapine 25 mg oral tablet: 1 tab(s) orally 2 times a day  sacubitril-valsartan 24 mg-26 mg oral tablet: 1 tab(s) orally 2 times a day  silodosin 8 mg oral capsule: 1 cap(s) orally once a day

## 2022-12-29 NOTE — PATIENT PROFILE ADULT - FUNCTIONAL ASSESSMENT - BASIC MOBILITY 6.
2-calculated by average/Not able to assess (calculate score using [facility] averaging method)

## 2022-12-29 NOTE — DISCHARGE NOTE PROVIDER - PROVIDER TOKENS
FREE:[LAST:[Primary Care],FIRST:[Doctor],PHONE:[(   )    -],FAX:[(   )    -]],PROVIDER:[TOKEN:[[id number]]] PROVIDER:[TOKEN:[[id number]],FOLLOWUP:[2 weeks]],FREE:[LAST:[Primary Care],FIRST:[Doctor],PHONE:[(   )    -],FAX:[(   )    -],FOLLOWUP:[1 week]]

## 2022-12-29 NOTE — PROGRESS NOTE ADULT - SUBJECTIVE AND OBJECTIVE BOX
Progress note    Patient seen and examined at bedside. He is a poor historian. He does not appear to be in any distress. He is confused AAOx1.    INTERVAL HPI/OVERNIGHT EVENTS:    REVIEW OF SYSTEMS:  CONSTITUTIONAL: No fever, weight loss, or fatigue  EYES: No eye pain, visual disturbances, or discharge  ENMT:  No difficulty hearing, tinnitus, vertigo; No sinus or throat pain  NECK: No pain or stiffness  BREASTS: No pain, masses, or nipple discharge  RESPIRATORY: No cough, wheezing, chills or hemoptysis; No shortness of breath  CARDIOVASCULAR: No chest pain, palpitations, dizziness, or leg swelling  GASTROINTESTINAL: No abdominal or epigastric pain. No nausea, vomiting, or hematemesis; No diarrhea or constipation. No melena or hematochezia.  GENITOURINARY: No dysuria, frequency, hematuria, or incontinence  NEUROLOGICAL: No headaches, memory loss, loss of strength, numbness, or tremors  SKIN: No itching, burning, rashes, or lesions   LYMPH NODES: No enlarged glands  ENDOCRINE: No heat or cold intolerance; No hair loss  MUSCULOSKELETAL: No joint pain or swelling; No muscle, back, or extremity pain  PSYCHIATRIC: No depression, anxiety, mood swings, or difficulty sleeping  HEME/LYMPH: No easy bruising, or bleeding gums  ALLERY AND IMMUNOLOGIC: No hives or eczema  FAMILY HISTORY:  No pertinent family history in first degree relatives      T(C): 36.8 (12-29-22 @ 14:23), Max: 36.8 (12-29-22 @ 14:23)  HR: 104 (12-29-22 @ 14:23) (98 - 115)  BP: 113/72 (12-29-22 @ 14:23) (104/66 - 114/89)  RR: 20 (12-29-22 @ 14:23) (18 - 20)  SpO2: 100% (12-29-22 @ 14:23) (95% - 100%)  Wt(kg): --Vital Signs Last 24 Hrs  T(C): 36.8 (29 Dec 2022 14:23), Max: 36.8 (29 Dec 2022 14:23)  T(F): 98.2 (29 Dec 2022 14:23), Max: 98.2 (29 Dec 2022 14:23)  HR: 104 (29 Dec 2022 14:23) (98 - 115)  BP: 113/72 (29 Dec 2022 14:23) (104/66 - 114/89)  BP(mean): --  RR: 20 (29 Dec 2022 14:23) (18 - 20)  SpO2: 100% (29 Dec 2022 14:23) (95% - 100%)    Parameters below as of 29 Dec 2022 14:23  Patient On (Oxygen Delivery Method): room air      No Known Allergies      PHYSICAL EXAM:  GENERAL: NAD, well-groomed, well-developed  HEAD:  Atraumatic, Normocephalic  EYES: EOMI, PERRLA, conjunctiva and sclera clear  ENMT: No tonsillar erythema, exudates, or enlargement; Moist mucous membranes, Good dentition, No lesions  NECK: Supple, No JVD, Normal thyroid  NERVOUS SYSTEM:  Alert & Oriented X3, Good concentration; Motor Strength 5/5 B/L upper and lower extremities; DTRs 2+ intact and symmetric  CHEST/LUNG: Clear to percussion bilaterally; No rales, rhonchi, wheezing, or rubs  HEART: Regular rate and rhythm; No murmurs, rubs, or gallops  ABDOMEN: Soft, Nontender, Nondistended; Bowel sounds present  EXTREMITIES:  2+ Peripheral Pulses, No clubbing, cyanosis, or edema  LYMPH: No lymphadenopathy noted  SKIN: No rashes or lesions    Consultant(s) Notes Reviewed:  [x ] YES  [ ] NO  Care Discussed with Consultants/Other Providers [ x] YES  [ ] NO    LABS:      RADIOLOGY & ADDITIONAL TESTS:    Imaging Personally Reviewed:  [ ] YES  [ ] NO  acetaminophen     Tablet .. 650 milliGRAM(s) Oral every 6 hours PRN  aluminum hydroxide/magnesium hydroxide/simethicone Suspension 30 milliLiter(s) Oral every 4 hours PRN  enoxaparin Injectable 100 milliGRAM(s) SubCutaneous every 12 hours  finasteride 5 milliGRAM(s) Oral daily  furosemide   Injectable 20 milliGRAM(s) IV Push daily  latanoprost 0.005% Ophthalmic Solution 1 Drop(s) Both EYES at bedtime  melatonin 3 milliGRAM(s) Oral at bedtime PRN  memantine 10 milliGRAM(s) Oral two times a day  metoprolol tartrate 25 milliGRAM(s) Oral two times a day  mirabegron ER 50 milliGRAM(s) Oral daily  ondansetron Injectable 4 milliGRAM(s) IV Push every 8 hours PRN  QUEtiapine 50 milliGRAM(s) Oral two times a day  senna 2 Tablet(s) Oral at bedtime  silodosin 8 milliGRAM(s) Oral daily      HEALTH ISSUES - PROBLEM Dx:    87-year-old male accompanied by his daughter c/o poor po intake, low urine output and leg swelling. Pt with a past medical history of Alzheimer's disease, BPH, hyperlipidemia, presents emerged department for weakness.  In addition  daughter states he  had a fall today hit back of head, also notes worsening lower extremity edema.  Daughter states patient has worsening weakness today.      #new onset of Afib:  tele  [id number]: 2   anti coag/ lovenox Q 12  Cardiology consult pending  f/u TSH  CE  AM EKG  start metoprolol 25mg BID and adjust as needed     #Debility possibly secondary to worsening dementia  #frequent falls  f/u B12, TSH, RPR, Ammonia   PT  cont Namenda   fall risk protocol      #R/O CHF vs transient leg swelling  lasix  f/u echo    #Hx of BPH  #hx of urinary retention  cont flomax / finasteride   bladder US does not show retention  measure input and output      continue home meds      Daughter who is the health care proxy requests pt to be DNR/DNI with limited intervention   Daughter agrees with anticoagulation, risks were discussed at length including complications with bleeding especially with hx of falls, daughter agrees with anticoagulation and verbalized understanding of the risks.      Prophylaxis Gi/VTE      Total time spent to complete patient's bedside assessment, review medical chart, discuss medical plan of care with covering medical team was ____25____ with > 50% of time spent face to face w/ patient, discussion with patient/family and/or coordination of care Progress note    Patient seen and examined at bedside. He is a poor historian. He does not appear to be in any distress. He is confused AAOx1.    INTERVAL HPI/OVERNIGHT EVENTS:    REVIEW OF SYSTEMS:  CONSTITUTIONAL: No fever, weight loss, or fatigue  EYES: No eye pain, visual disturbances, or discharge  ENMT:  No difficulty hearing, tinnitus, vertigo; No sinus or throat pain  NECK: No pain or stiffness  BREASTS: No pain, masses, or nipple discharge  RESPIRATORY: No cough, wheezing, chills or hemoptysis; No shortness of breath  CARDIOVASCULAR: No chest pain, palpitations, dizziness, or leg swelling  GASTROINTESTINAL: No abdominal or epigastric pain. No nausea, vomiting, or hematemesis; No diarrhea or constipation. No melena or hematochezia.  GENITOURINARY: No dysuria, frequency, hematuria, or incontinence  NEUROLOGICAL: No headaches, memory loss, loss of strength, numbness, or tremors  SKIN: No itching, burning, rashes, or lesions   LYMPH NODES: No enlarged glands  ENDOCRINE: No heat or cold intolerance; No hair loss  MUSCULOSKELETAL: No joint pain or swelling; No muscle, back, or extremity pain  PSYCHIATRIC: No depression, anxiety, mood swings, or difficulty sleeping  HEME/LYMPH: No easy bruising, or bleeding gums  ALLERY AND IMMUNOLOGIC: No hives or eczema  FAMILY HISTORY:  No pertinent family history in first degree relatives      T(C): 36.8 (12-29-22 @ 14:23), Max: 36.8 (12-29-22 @ 14:23)  HR: 104 (12-29-22 @ 14:23) (98 - 115)  BP: 113/72 (12-29-22 @ 14:23) (104/66 - 114/89)  RR: 20 (12-29-22 @ 14:23) (18 - 20)  SpO2: 100% (12-29-22 @ 14:23) (95% - 100%)  Wt(kg): --Vital Signs Last 24 Hrs  T(C): 36.8 (29 Dec 2022 14:23), Max: 36.8 (29 Dec 2022 14:23)  T(F): 98.2 (29 Dec 2022 14:23), Max: 98.2 (29 Dec 2022 14:23)  HR: 104 (29 Dec 2022 14:23) (98 - 115)  BP: 113/72 (29 Dec 2022 14:23) (104/66 - 114/89)  BP(mean): --  RR: 20 (29 Dec 2022 14:23) (18 - 20)  SpO2: 100% (29 Dec 2022 14:23) (95% - 100%)    Parameters below as of 29 Dec 2022 14:23  Patient On (Oxygen Delivery Method): room air      No Known Allergies      PHYSICAL EXAM:  GENERAL: NAD, well-groomed, well-developed  HEAD:  Atraumatic, Normocephalic  EYES: EOMI, PERRLA, conjunctiva and sclera clear  ENMT: No tonsillar erythema, exudates, or enlargement; Moist mucous membranes, Good dentition, No lesions  NECK: Supple, No JVD, Normal thyroid  NERVOUS SYSTEM:  Alert & Oriented X3, Good concentration; Motor Strength 5/5 B/L upper and lower extremities; DTRs 2+ intact and symmetric  CHEST/LUNG: Clear to percussion bilaterally; No rales, rhonchi, wheezing, or rubs  HEART: Regular rate and rhythm; No murmurs, rubs, or gallops  ABDOMEN: Soft, Nontender, Nondistended; Bowel sounds present  EXTREMITIES:  2+ Peripheral Pulses, No clubbing, cyanosis, or edema  LYMPH: No lymphadenopathy noted  SKIN: No rashes or lesions    Consultant(s) Notes Reviewed:  [x ] YES  [ ] NO  Care Discussed with Consultants/Other Providers [ x] YES  [ ] NO    LABS:      RADIOLOGY & ADDITIONAL TESTS:    Imaging Personally Reviewed:  [ ] YES  [ ] NO  acetaminophen     Tablet .. 650 milliGRAM(s) Oral every 6 hours PRN  aluminum hydroxide/magnesium hydroxide/simethicone Suspension 30 milliLiter(s) Oral every 4 hours PRN  enoxaparin Injectable 100 milliGRAM(s) SubCutaneous every 12 hours  finasteride 5 milliGRAM(s) Oral daily  furosemide   Injectable 20 milliGRAM(s) IV Push daily  latanoprost 0.005% Ophthalmic Solution 1 Drop(s) Both EYES at bedtime  melatonin 3 milliGRAM(s) Oral at bedtime PRN  memantine 10 milliGRAM(s) Oral two times a day  metoprolol tartrate 25 milliGRAM(s) Oral two times a day  mirabegron ER 50 milliGRAM(s) Oral daily  ondansetron Injectable 4 milliGRAM(s) IV Push every 8 hours PRN  QUEtiapine 50 milliGRAM(s) Oral two times a day  senna 2 Tablet(s) Oral at bedtime  silodosin 8 milliGRAM(s) Oral daily      HEALTH ISSUES - PROBLEM Dx:    87-year-old male accompanied by his daughter c/o poor po intake, low urine output and leg swelling. Pt with a past medical history of Alzheimer's disease, BPH, hyperlipidemia, presents emerged department for weakness.  In addition  daughter states he  had a fall today hit back of head, also notes worsening lower extremity edema.  Daughter states patient has worsening weakness today.      #new onset of Afib:  tele  [id number]: 2   anti coag/ lovenox Q 12  Cardiology consult recs appreciated  TSH >5, will f/u w/ free t4  Metoprolol 25mg BID, will evaluate in am to increase dose    #Debility possibly secondary to worsening dementia  #frequent falls  f/u B12 pending, TSH, RPR, Ammonia   PT  cont Namenda   fall risk protocol      #R/O CHF vs transient leg swelling  lasix  Echo pending    #Hx of BPH  #hx of urinary retention  cont flomax / finasteride   bladder US does not show retention  measure input and output    continue home meds    Daughter who is the health care proxy requests pt to be DNR/DNI with limited intervention   Daughter agrees with anticoagulation, risks were discussed at length including complications with bleeding especially with hx of falls, daughter agrees with anticoagulation and verbalized understanding of the risks.      Prophylaxis GI/VTE    Total time spent to complete patient's bedside assessment, review medical chart, discuss medical plan of care with covering medical team was ____25____ with > 50% of time spent face to face w/ patient, discussion with patient/family and/or coordination of care Progress note    Patient seen and examined at bedside. He is a poor historian. He does not appear to be in any distress. He is confused AAOx1.    INTERVAL HPI/OVERNIGHT EVENTS:    REVIEW OF SYSTEMS:  CONSTITUTIONAL: No fever, weight loss, or fatigue  EYES: No eye pain, visual disturbances, or discharge  ENMT:  No difficulty hearing, tinnitus, vertigo; No sinus or throat pain  NECK: No pain or stiffness  BREASTS: No pain, masses, or nipple discharge  RESPIRATORY: No cough, wheezing, chills or hemoptysis; No shortness of breath  CARDIOVASCULAR: No chest pain, palpitations, dizziness, or leg swelling  GASTROINTESTINAL: No abdominal or epigastric pain. No nausea, vomiting, or hematemesis; No diarrhea or constipation. No melena or hematochezia.  GENITOURINARY: No dysuria, frequency, hematuria, or incontinence  NEUROLOGICAL: No headaches, memory loss, loss of strength, numbness, or tremors  SKIN: No itching, burning, rashes, or lesions   LYMPH NODES: No enlarged glands  ENDOCRINE: No heat or cold intolerance; No hair loss  MUSCULOSKELETAL: No joint pain or swelling; No muscle, back, or extremity pain  PSYCHIATRIC: No depression, anxiety, mood swings, or difficulty sleeping  HEME/LYMPH: No easy bruising, or bleeding gums  ALLERY AND IMMUNOLOGIC: No hives or eczema  FAMILY HISTORY:  No pertinent family history in first degree relatives      T(C): 36.8 (12-29-22 @ 14:23), Max: 36.8 (12-29-22 @ 14:23)  HR: 104 (12-29-22 @ 14:23) (98 - 115)  BP: 113/72 (12-29-22 @ 14:23) (104/66 - 114/89)  RR: 20 (12-29-22 @ 14:23) (18 - 20)  SpO2: 100% (12-29-22 @ 14:23) (95% - 100%)  Wt(kg): --Vital Signs Last 24 Hrs  T(C): 36.8 (29 Dec 2022 14:23), Max: 36.8 (29 Dec 2022 14:23)  T(F): 98.2 (29 Dec 2022 14:23), Max: 98.2 (29 Dec 2022 14:23)  HR: 104 (29 Dec 2022 14:23) (98 - 115)  BP: 113/72 (29 Dec 2022 14:23) (104/66 - 114/89)  BP(mean): --  RR: 20 (29 Dec 2022 14:23) (18 - 20)  SpO2: 100% (29 Dec 2022 14:23) (95% - 100%)    Parameters below as of 29 Dec 2022 14:23  Patient On (Oxygen Delivery Method): room air      No Known Allergies      PHYSICAL EXAM:  GENERAL: NAD, well-groomed, well-developed  HEAD:  Atraumatic, Normocephalic  EYES: EOMI, PERRLA, conjunctiva and sclera clear  ENMT: No tonsillar erythema, exudates, or enlargement; Moist mucous membranes, Good dentition, No lesions  NECK: Supple, No JVD, Normal thyroid  NERVOUS SYSTEM:  Alert & Oriented X3, Good concentration; Motor Strength 5/5 B/L upper and lower extremities; DTRs 2+ intact and symmetric  CHEST/LUNG: Clear to percussion bilaterally; No rales, rhonchi, wheezing, or rubs  HEART: Regular rate and rhythm; No murmurs, rubs, or gallops  ABDOMEN: Soft, Nontender, Nondistended; Bowel sounds present  EXTREMITIES:  2+ Peripheral Pulses, No clubbing, cyanosis, or edema  LYMPH: No lymphadenopathy noted  SKIN: No rashes or lesions    Consultant(s) Notes Reviewed:  [x ] YES  [ ] NO  Care Discussed with Consultants/Other Providers [ x] YES  [ ] NO    LABS:      RADIOLOGY & ADDITIONAL TESTS:    Imaging Personally Reviewed:  [ ] YES  [ ] NO  acetaminophen     Tablet .. 650 milliGRAM(s) Oral every 6 hours PRN  aluminum hydroxide/magnesium hydroxide/simethicone Suspension 30 milliLiter(s) Oral every 4 hours PRN  enoxaparin Injectable 100 milliGRAM(s) SubCutaneous every 12 hours  finasteride 5 milliGRAM(s) Oral daily  furosemide   Injectable 20 milliGRAM(s) IV Push daily  latanoprost 0.005% Ophthalmic Solution 1 Drop(s) Both EYES at bedtime  melatonin 3 milliGRAM(s) Oral at bedtime PRN  memantine 10 milliGRAM(s) Oral two times a day  metoprolol tartrate 25 milliGRAM(s) Oral two times a day  mirabegron ER 50 milliGRAM(s) Oral daily  ondansetron Injectable 4 milliGRAM(s) IV Push every 8 hours PRN  QUEtiapine 50 milliGRAM(s) Oral two times a day  senna 2 Tablet(s) Oral at bedtime  silodosin 8 milliGRAM(s) Oral daily      HEALTH ISSUES - PROBLEM Dx:    87-year-old male accompanied by his daughter c/o poor po intake, low urine output and leg swelling. Pt with a past medical history of Alzheimer's disease, BPH, hyperlipidemia, presents emerged department for weakness.  In addition  daughter states he  had a fall today hit back of head, also notes worsening lower extremity edema.  Daughter states patient has worsening weakness today.      #new onset of Afib:  tele  [id number]: 2   anti coag/ lovenox Q 12  Cardiology consult recs appreciated  TSH >5, will f/u w/ free t4  Metoprolol 25mg BID, will evaluate in am to increase dose    #Debility possibly secondary to worsening dementia, chronic  #frequent falls  f/u B12 pending, TSH, RPR, Ammonia   PT  cont Namenda   fall risk protocol      #R/O CHF vs transient leg swelling  lasix 20mg IV daily  Echo pending    #Hx of BPH  #hx of urinary retention  cont flomax / finasteride   bladder US does not show retention  measure input and output    continue home meds    Daughter who is the health care proxy requests pt to be DNR/DNI with limited intervention   Daughter agrees with anticoagulation, risks were discussed at length including complications with bleeding especially with hx of falls, daughter agrees with anticoagulation and verbalized understanding of the risks.      Prophylaxis GI/VTE    Total time spent to complete patient's bedside assessment, review medical chart, discuss medical plan of care with covering medical team was ____25____ with > 50% of time spent face to face w/ patient, discussion with patient/family and/or coordination of care Progress note    Patient seen and examined at bedside. He is a poor historian. He does not appear to be in any distress. He is confused AAOx1.    INTERVAL HPI/OVERNIGHT EVENTS:    REVIEW OF SYSTEMS:  CONSTITUTIONAL: No fever, weight loss, or fatigue  EYES: No eye pain, visual disturbances, or discharge  ENMT:  No difficulty hearing, tinnitus, vertigo; No sinus or throat pain  NECK: No pain or stiffness  BREASTS: No pain, masses, or nipple discharge  RESPIRATORY: No cough, wheezing, chills or hemoptysis; No shortness of breath  CARDIOVASCULAR: No chest pain, palpitations, dizziness, or leg swelling  GASTROINTESTINAL: No abdominal or epigastric pain. No nausea, vomiting, or hematemesis; No diarrhea or constipation. No melena or hematochezia.  GENITOURINARY: No dysuria, frequency, hematuria, or incontinence  NEUROLOGICAL: No headaches, memory loss, loss of strength, numbness, or tremors  SKIN: No itching, burning, rashes, or lesions   LYMPH NODES: No enlarged glands  ENDOCRINE: No heat or cold intolerance; No hair loss  MUSCULOSKELETAL: No joint pain or swelling; No muscle, back, or extremity pain  PSYCHIATRIC: No depression, anxiety, mood swings, or difficulty sleeping  HEME/LYMPH: No easy bruising, or bleeding gums  ALLERY AND IMMUNOLOGIC: No hives or eczema  FAMILY HISTORY:  No pertinent family history in first degree relatives      T(C): 36.8 (12-29-22 @ 14:23), Max: 36.8 (12-29-22 @ 14:23)  HR: 104 (12-29-22 @ 14:23) (98 - 115)  BP: 113/72 (12-29-22 @ 14:23) (104/66 - 114/89)  RR: 20 (12-29-22 @ 14:23) (18 - 20)  SpO2: 100% (12-29-22 @ 14:23) (95% - 100%)  Wt(kg): --Vital Signs Last 24 Hrs  T(C): 36.8 (29 Dec 2022 14:23), Max: 36.8 (29 Dec 2022 14:23)  T(F): 98.2 (29 Dec 2022 14:23), Max: 98.2 (29 Dec 2022 14:23)  HR: 104 (29 Dec 2022 14:23) (98 - 115)  BP: 113/72 (29 Dec 2022 14:23) (104/66 - 114/89)  BP(mean): --  RR: 20 (29 Dec 2022 14:23) (18 - 20)  SpO2: 100% (29 Dec 2022 14:23) (95% - 100%)    Parameters below as of 29 Dec 2022 14:23  Patient On (Oxygen Delivery Method): room air      No Known Allergies      PHYSICAL EXAM:  GENERAL: NAD, well-groomed, well-developed  HEAD:  Atraumatic, Normocephalic  EYES: EOMI, PERRLA, conjunctiva and sclera clear  ENMT: No tonsillar erythema, exudates, or enlargement; Moist mucous membranes, Good dentition, No lesions  NECK: Supple, No JVD, Normal thyroid  NERVOUS SYSTEM:  Alert & Oriented X3, Good concentration; Motor Strength 5/5 B/L upper and lower extremities; DTRs 2+ intact and symmetric  CHEST/LUNG: Clear to percussion bilaterally; No rales, rhonchi, wheezing, or rubs  HEART: Regular rate and rhythm; No murmurs, rubs, or gallops  ABDOMEN: Soft, Nontender, Nondistended; Bowel sounds present  EXTREMITIES:  2+ Peripheral Pulses, No clubbing, cyanosis, or edema  LYMPH: No lymphadenopathy noted  SKIN: No rashes or lesions    Consultant(s) Notes Reviewed:  [x ] YES  [ ] NO  Care Discussed with Consultants/Other Providers [ x] YES  [ ] NO    LABS:      RADIOLOGY & ADDITIONAL TESTS:    Imaging Personally Reviewed:  [ ] YES  [ ] NO  acetaminophen     Tablet .. 650 milliGRAM(s) Oral every 6 hours PRN  aluminum hydroxide/magnesium hydroxide/simethicone Suspension 30 milliLiter(s) Oral every 4 hours PRN  enoxaparin Injectable 100 milliGRAM(s) SubCutaneous every 12 hours  finasteride 5 milliGRAM(s) Oral daily  furosemide   Injectable 20 milliGRAM(s) IV Push daily  latanoprost 0.005% Ophthalmic Solution 1 Drop(s) Both EYES at bedtime  melatonin 3 milliGRAM(s) Oral at bedtime PRN  memantine 10 milliGRAM(s) Oral two times a day  metoprolol tartrate 25 milliGRAM(s) Oral two times a day  mirabegron ER 50 milliGRAM(s) Oral daily  ondansetron Injectable 4 milliGRAM(s) IV Push every 8 hours PRN  QUEtiapine 50 milliGRAM(s) Oral two times a day  senna 2 Tablet(s) Oral at bedtime  silodosin 8 milliGRAM(s) Oral daily      HEALTH ISSUES - PROBLEM Dx:    87-year-old male accompanied by his daughter c/o poor po intake, low urine output and leg swelling. Pt with a past medical history of Alzheimer's disease, BPH, hyperlipidemia, presents emerged department for weakness.  In addition  daughter states he  had a fall today hit back of head, also notes worsening lower extremity edema.  Daughter states patient has worsening weakness today.      #new onset of Afib:  tele  [id number]: 2   anti coag/ lovenox Q 12  Cardiology consult recs appreciated  TSH >5, will f/u w/ free t4  Metoprolol 25mg BID, will evaluate in am to increase dose  Eliquis sent to [facility] pharmacy, $0 COPAY    #Debility possibly secondary to worsening dementia, chronic  #frequent falls  f/u B12 pending, TSH, RPR, Ammonia   PT  cont Namenda   fall risk protocol      #R/O CHF vs transient leg swelling  lasix 20mg IV daily  Echo pending    #Hx of BPH  #hx of urinary retention  cont flomax / finasteride   bladder US does not show retention  measure input and output    continue home meds    Daughter who is the health care proxy requests pt to be DNR/DNI with limited intervention   Daughter agrees with anticoagulation, risks were discussed at length including complications with bleeding especially with hx of falls, daughter agrees with anticoagulation and verbalized understanding of the risks.      Prophylaxis GI/VTE    Total time spent to complete patient's bedside assessment, review medical chart, discuss medical plan of care with covering medical team was ____25____ with > 50% of time spent face to face w/ patient, discussion with patient/family and/or coordination of care Progress note    Patient seen and examined at bedside. He is a poor historian. He does not appear to be in any distress. He is confused AAOx1.    INTERVAL HPI/OVERNIGHT EVENTS:    REVIEW OF SYSTEMS:  CONSTITUTIONAL: No fever, weight loss, or fatigue  EYES: No eye pain, visual disturbances, or discharge  ENMT:  No difficulty hearing, tinnitus, vertigo; No sinus or throat pain  NECK: No pain or stiffness  BREASTS: No pain, masses, or nipple discharge  RESPIRATORY: No cough, wheezing, chills or hemoptysis; No shortness of breath  CARDIOVASCULAR: No chest pain, palpitations, dizziness, or leg swelling  GASTROINTESTINAL: No abdominal or epigastric pain. No nausea, vomiting, or hematemesis; No diarrhea or constipation. No melena or hematochezia.  GENITOURINARY: No dysuria, frequency, hematuria, or incontinence  NEUROLOGICAL: No headaches, memory loss, loss of strength, numbness, or tremors  SKIN: No itching, burning, rashes, or lesions   LYMPH NODES: No enlarged glands  ENDOCRINE: No heat or cold intolerance; No hair loss  MUSCULOSKELETAL: No joint pain or swelling; No muscle, back, or extremity pain  PSYCHIATRIC: No depression, anxiety, mood swings, or difficulty sleeping  HEME/LYMPH: No easy bruising, or bleeding gums  ALLERY AND IMMUNOLOGIC: No hives or eczema  FAMILY HISTORY:  No pertinent family history in first degree relatives      T(C): 36.8 (12-29-22 @ 14:23), Max: 36.8 (12-29-22 @ 14:23)  HR: 104 (12-29-22 @ 14:23) (98 - 115)  BP: 113/72 (12-29-22 @ 14:23) (104/66 - 114/89)  RR: 20 (12-29-22 @ 14:23) (18 - 20)  SpO2: 100% (12-29-22 @ 14:23) (95% - 100%)  Wt(kg): --Vital Signs Last 24 Hrs  T(C): 36.8 (29 Dec 2022 14:23), Max: 36.8 (29 Dec 2022 14:23)  T(F): 98.2 (29 Dec 2022 14:23), Max: 98.2 (29 Dec 2022 14:23)  HR: 104 (29 Dec 2022 14:23) (98 - 115)  BP: 113/72 (29 Dec 2022 14:23) (104/66 - 114/89)  BP(mean): --  RR: 20 (29 Dec 2022 14:23) (18 - 20)  SpO2: 100% (29 Dec 2022 14:23) (95% - 100%)    Parameters below as of 29 Dec 2022 14:23  Patient On (Oxygen Delivery Method): room air      No Known Allergies      PHYSICAL EXAM:  GENERAL: NAD, well-groomed, well-developed  HEAD:  Atraumatic, Normocephalic  EYES: EOMI, PERRLA, conjunctiva and sclera clear  ENMT: No tonsillar erythema, exudates, or enlargement; Moist mucous membranes, Good dentition, No lesions  NECK: Supple, No JVD, Normal thyroid  NERVOUS SYSTEM:  Alert & Oriented X3, Good concentration; Motor Strength 5/5 B/L upper and lower extremities; DTRs 2+ intact and symmetric  CHEST/LUNG: Clear to percussion bilaterally; No rales, rhonchi, wheezing, or rubs  HEART: Regular rate and rhythm; No murmurs, rubs, or gallops  ABDOMEN: Soft, Nontender, Nondistended; Bowel sounds present  EXTREMITIES:  2+ Peripheral Pulses, No clubbing, cyanosis, or edema  LYMPH: No lymphadenopathy noted  SKIN: No rashes or lesions    Consultant(s) Notes Reviewed:  [x ] YES  [ ] NO  Care Discussed with Consultants/Other Providers [ x] YES  [ ] NO    LABS:      RADIOLOGY & ADDITIONAL TESTS:    Imaging Personally Reviewed:  [ ] YES  [ ] NO  acetaminophen     Tablet .. 650 milliGRAM(s) Oral every 6 hours PRN  aluminum hydroxide/magnesium hydroxide/simethicone Suspension 30 milliLiter(s) Oral every 4 hours PRN  enoxaparin Injectable 100 milliGRAM(s) SubCutaneous every 12 hours  finasteride 5 milliGRAM(s) Oral daily  furosemide   Injectable 20 milliGRAM(s) IV Push daily  latanoprost 0.005% Ophthalmic Solution 1 Drop(s) Both EYES at bedtime  melatonin 3 milliGRAM(s) Oral at bedtime PRN  memantine 10 milliGRAM(s) Oral two times a day  metoprolol tartrate 25 milliGRAM(s) Oral two times a day  mirabegron ER 50 milliGRAM(s) Oral daily  ondansetron Injectable 4 milliGRAM(s) IV Push every 8 hours PRN  QUEtiapine 50 milliGRAM(s) Oral two times a day  senna 2 Tablet(s) Oral at bedtime  silodosin 8 milliGRAM(s) Oral daily      HEALTH ISSUES - PROBLEM Dx:    87-year-old male accompanied by his daughter c/o poor po intake, low urine output and leg swelling. Pt with a past medical history of Alzheimer's disease, BPH, hyperlipidemia, presents emerged department for weakness.  In addition  daughter states he  had a fall today hit back of head, also notes worsening lower extremity edema.  Daughter states patient has worsening weakness today.      #new onset of Afib:  tele  [id number]: 2   Stop lovenox, start eliquis ([facility], $0 copay)  Cardiology consult recs appreciated  TSH >5, will f/u w/ free t4  Metoprolol 25mg BID, will evaluate in am if rate controlled    #Debility possibly secondary to worsening dementia, chronic  #frequent falls  f/u B12 pending, TSH (low), RPR, Ammonia   PT  cont Namenda   fall risk protocol      #R/O CHF vs transient leg swelling  lasix 20mg IV daily  Echo pending    #Hx of BPH  #hx of urinary retention  cont flomax / finasteride   bladder US does not show retention  measure input and output    continue home meds    Daughter who is the health care proxy requests pt to be DNR/DNI with limited intervention   Daughter agrees with anticoagulation, risks were discussed at length including complications with bleeding especially with hx of falls, daughter agrees with anticoagulation and verbalized understanding of the risks.      Prophylaxis GI/VTE    Total time spent to complete patient's bedside assessment, review medical chart, discuss medical plan of care with covering medical team was ____25____ with > 50% of time spent face to face w/ patient, discussion with patient/family and/or coordination of care

## 2022-12-29 NOTE — DISCHARGE NOTE PROVIDER - ATTENDING DISCHARGE PHYSICAL EXAMINATION:
VITALS:   T(C): 35.6 (01-09-23 @ 14:44), Max: 35.9 (01-09-23 @ 05:02)  HR: 72 (01-09-23 @ 14:44) (70 - 74)  BP: 110/63 (01-09-23 @ 14:44) (103/59 - 130/55)  RR: 18 (01-09-23 @ 14:44) (18 - 18)  SpO2: 92% (01-09-23 @ 14:44) (92% - 98%)    GENERAL: AAOx2, demented    HEAD:  Atraumatic, Normocephalic  EYES: EOMI, PERRLA, no pallor   ENT:  normal oropharynx , no thyromegaly   NECK: Supple, No JVD  CHEST/LUNG: Clear to auscultation bilaterally; No rales, rhonchi, wheezing, or rubs.  Symmetric expansion   HEART:  S1 S2 RRR ; No murmurs or rubs, irregularly irregular   ABDOMEN: Soft, nontender, nondistended, BS wnl   EXTREMITIES:  No  cyanosis, or edema  NERVOUS SYSTEM:  A&Ox2, no acute  focal deficits

## 2022-12-29 NOTE — DISCHARGE NOTE PROVIDER - HOSPITAL COURSE
87-year-old male accompanied by his daughter c/o poor po intake, low urine output and leg swelling. Pt with a past medical history of Alzheimer's disease, BPH, hyperlipidemia, presents emerged department for weakness.  In addition  daughter states he  had a fall today hit back of head, also notes worsening lower extremity edema.  Daughter states patient has worsening weakness today.    Assessment /Plan   New onset Atrial fibrillation s/p RVR   [id number]: 4 (left systolic dysfunction, HTN, age)   - Eliquis ([facility], $0 copay)  - Cardiology consult recs appreciated, started on Entresto, outpatient eval for Yohana clip  - TSH 5.04, free t4  - cont metoprolol 75 mg bid.   - ECHO EF 27 % with severe MVR   - trop x 1-ve   - Daughter agrees with anticoagulation, risks were discussed at length previously regarding complications with bleeding especially with hx of falls, daughter agrees with anticoagulation and verbalized understanding of the risks.      Severe MVR with acute systolic CHF   - outpatient eval for mitraclip with cardiology   - lasix 40 mg IV x1 , change to lasix 20 mg po daily   - daily weights   - cont metoprolol 75 mg po bid . cont low dose entresto 24/26 mg bid   - ischemic eval timing per cardiology , may need mitraclip via catheter, outpatient eval     Dementia with weakness with dysphagia  - SLP swallow eval failed, family okay with comfort foods, puree with TL.  aspiration precautions   -CT head unremarkable   - PT eval/treat, JULI , seen by physiatry   - B12 351, TSH (low), RPR (-), Ammonia 21  - UA not c/w UTI   - cont Namenda   fall risk protocol   - Consult- neurology recs seroquel 25 mg daily 50 mg HS, decreased to 25 mg bid.      BPH   - on rapaflo and  finasteride,   - monitor     Glaucoma   - cont home eye drops    87-year-old male accompanied by his daughter c/o poor po intake, low urine output and leg swelling. Pt with a past medical history of Alzheimer's disease,   BPH, hyperlipidemia, presents emerged department for weakness.  In addition  daughter states he  had a fall today hit back of head, also notes worsening   lower extremity edema.  Daughter states patient has worsening weakness today.    Assessment /Plan   New onset Atrial fibrillation s/p RVR   [id number]: 4 (left systolic dysfunction, HTN, age)   - Cardiology consulted ,   - TSH 5.04, free t4 wnl   - cont metoprolol 50 mg bid, cont eliquis 5 mg po, entresto 24/26 mg bid   - ECHO EF 27 % with severe MVR   - trop x 1-ve   - Daughter agrees with anticoagulation, risks were discussed at length previously regarding complications with bleeding especially with hx of falls,   daughter agrees with anticoagulation and verbalized understanding of the risks.    - overall condition improved and patient being discharged to SNF     Severe MVR with acute systolic CHF   - lasix 40 mg IV titrated to lasix 20 mg po daily   - cont metoprolol 50 mg po bid . 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   - SLP swallow eval failed, family okay with comfort foods, puree with TL.  aspiration precautions   - d/w family no feeding tube.   -CT head unremarkable   - PT eval/treat, JULI , seen by physiatry   - B12 351, TSH (low), RPR (-), Ammonia 21  - UA not c/w UTI   - cont Namenda   - fall risk protocol   - Consult- neurology recs seroquel 25 mg daily 50 mg HS, decreased to 25 mg bid.    - DNR/DNI     BPH   - on rapaflo and  finasteride,   - monitor     Glaucoma   - cont home eye drops

## 2022-12-29 NOTE — DISCHARGE NOTE PROVIDER - CARE PROVIDER_API CALL
Primary Care, Doctor  Phone: (   )    -  Fax: (   )    -  Follow Up Time:     Estiven Fuller)  Cardiovascular Disease; Internal Medicine  [address] [place] [address]  Phone: [phone]  Fax: [phone]  Follow Up Time:    Estiven Fuller)  Cardiovascular Disease; Internal Medicine  [address] [place] [address]  Phone: [phone]  Fax: [phone]  Follow Up Time: 2 weeks    Primary Care, Doctor  Phone: (   )    -  Fax: (   )    -  Follow Up Time: 1 week

## 2022-12-29 NOTE — PATIENT PROFILE ADULT - FALL HARM RISK - RISK INTERVENTIONS

## 2022-12-29 NOTE — DISCHARGE NOTE PROVIDER - CARE PROVIDERS DIRECT ADDRESSES
,DirectAddress_Unknown,[email or web address] ,[email or web address],DirectAddress_Unknown

## 2022-12-29 NOTE — DISCHARGE NOTE PROVIDER - NSDCCPCAREPLAN_GEN_ALL_CORE_FT
PRINCIPAL DISCHARGE DIAGNOSIS  Diagnosis: Atrial fibrillation  Assessment and Plan of Treatment: c/w your Metoprolol and Eliquis.  FU outpatient with your Cardiologist for further evaluation      SECONDARY DISCHARGE DIAGNOSES  Diagnosis: Weakness  Assessment and Plan of Treatment: You will be dsicahrged to subacute Rehab for PT/OT    Diagnosis: Swelling of lower extremity  Assessment and Plan of Treatment: Continue with your Lasix     PRINCIPAL DISCHARGE DIAGNOSIS  Diagnosis: Atrial fibrillation with RVR  Assessment and Plan of Treatment: New onset Atrial fibrillation s/p RVR   - [id number]: 4 (left systolic dysfunction, HTN, age)   - Cardiology consulted ,   - TSH 5.04, free t4 wnl   - cont metoprolol 50 mg bid, cont eliquis 5 mg po, entresto 24/26 mg bid   - ECHO EF 27 % with severe MVR   - trop x 1-ve   - Daughter agrees with anticoagulation, risks were discussed at length previously regarding complications with bleeding especially with hx of falls,   daughter agrees with anticoagulation and verbalized understanding of the risks.      SECONDARY DISCHARGE DIAGNOSES  Diagnosis: Acute systolic congestive heart failure  Assessment and Plan of Treatment: Acute systolic CHF   - lasix 40 mg IV titrated to lasix 20 mg po daily   - cont metoprolol 50 mg po bid . cont low dose entresto 24/26 mg bid   -ECHO EF 27%    Diagnosis: Weakness  Assessment and Plan of Treatment: You will be dsicahrged to subacute Rehab for PT/OT    Diagnosis: Severe mitral valve regurgitation  Assessment and Plan of Treatment: - cont bblocker,  ECHO reviewed EF 27%   - family decline paulina clip       Diagnosis: Alzheimer's dementia  Assessment and Plan of Treatment: - SLP swallow eval failed, family okay with comfort foods, puree with TL.  aspiration precautions   - d/w family no feeding tube.   -CT head unremarkable   - PT eval/treat, JULI , seen by physiatry   - B12 351, TSH (low), RPR (-), Ammonia 21  - UA not c/w UTI   - cont Namenda   - fall risk protocol   - Consult- neurology recs seroquel 25 mg daily 50 mg HS, decreased to 25 mg bid.    - DNR/DNI

## 2022-12-30 LAB
ANION GAP SERPL CALC-SCNC: 12 MMOL/L — SIGNIFICANT CHANGE UP (ref 7–14)
BUN SERPL-MCNC: 34 MG/DL — HIGH (ref 10–20)
CALCIUM SERPL-MCNC: 9.5 MG/DL — SIGNIFICANT CHANGE UP (ref 8.4–10.5)
CHLORIDE SERPL-SCNC: 106 MMOL/L — SIGNIFICANT CHANGE UP (ref 98–110)
CO2 SERPL-SCNC: 23 MMOL/L — SIGNIFICANT CHANGE UP (ref 17–32)
CREAT SERPL-MCNC: 1.5 MG/DL — SIGNIFICANT CHANGE UP (ref 0.7–1.5)
EGFR: 45 ML/MIN/1.73M2 — LOW
GLUCOSE SERPL-MCNC: 124 MG/DL — HIGH (ref 70–99)
HCT VFR BLD CALC: 35.7 % — LOW (ref 42–52)
HGB BLD-MCNC: 11.7 G/DL — LOW (ref 14–18)
MCHC RBC-ENTMCNC: 29.5 PG — SIGNIFICANT CHANGE UP (ref 27–31)
MCHC RBC-ENTMCNC: 32.8 G/DL — SIGNIFICANT CHANGE UP (ref 32–37)
MCV RBC AUTO: 89.9 FL — SIGNIFICANT CHANGE UP (ref 80–94)
NRBC # BLD: 0 /100 WBCS — SIGNIFICANT CHANGE UP (ref 0–0)
PLATELET # BLD AUTO: 153 K/UL — SIGNIFICANT CHANGE UP (ref 130–400)
POTASSIUM SERPL-MCNC: 3.6 MMOL/L — SIGNIFICANT CHANGE UP (ref 3.5–5)
POTASSIUM SERPL-SCNC: 3.6 MMOL/L — SIGNIFICANT CHANGE UP (ref 3.5–5)
RBC # BLD: 3.97 M/UL — LOW (ref 4.7–6.1)
RBC # FLD: 14 % — SIGNIFICANT CHANGE UP (ref 11.5–14.5)
SODIUM SERPL-SCNC: 141 MMOL/L — SIGNIFICANT CHANGE UP (ref 135–146)
WBC # BLD: 6.18 K/UL — SIGNIFICANT CHANGE UP (ref 4.8–10.8)
WBC # FLD AUTO: 6.18 K/UL — SIGNIFICANT CHANGE UP (ref 4.8–10.8)

## 2022-12-30 PROCEDURE — 93306 TTE W/DOPPLER COMPLETE: CPT | Mod: 26

## 2022-12-30 PROCEDURE — 99232 SBSQ HOSP IP/OBS MODERATE 35: CPT

## 2022-12-30 PROCEDURE — 72100 X-RAY EXAM L-S SPINE 2/3 VWS: CPT | Mod: 26

## 2022-12-30 RX ORDER — POTASSIUM CHLORIDE 20 MEQ
20 PACKET (EA) ORAL
Refills: 0 | Status: COMPLETED | OUTPATIENT
Start: 2022-12-30 | End: 2022-12-30

## 2022-12-30 RX ORDER — HALOPERIDOL DECANOATE 100 MG/ML
5 INJECTION INTRAMUSCULAR ONCE
Refills: 0 | Status: COMPLETED | OUTPATIENT
Start: 2022-12-30 | End: 2022-12-30

## 2022-12-30 RX ORDER — DIPHENHYDRAMINE HCL 50 MG
50 CAPSULE ORAL ONCE
Refills: 0 | Status: COMPLETED | OUTPATIENT
Start: 2022-12-30 | End: 2022-12-30

## 2022-12-30 RX ORDER — METOPROLOL TARTRATE 50 MG
50 TABLET ORAL
Refills: 0 | Status: DISCONTINUED | OUTPATIENT
Start: 2022-12-31 | End: 2023-01-03

## 2022-12-30 RX ORDER — METOPROLOL TARTRATE 50 MG
50 TABLET ORAL
Refills: 0 | Status: DISCONTINUED | OUTPATIENT
Start: 2022-12-30 | End: 2022-12-30

## 2022-12-30 RX ADMIN — Medication 20 MILLIEQUIVALENT(S): at 17:42

## 2022-12-30 RX ADMIN — FINASTERIDE 5 MILLIGRAM(S): 5 TABLET, FILM COATED ORAL at 12:27

## 2022-12-30 RX ADMIN — Medication 50 MILLIGRAM(S): at 01:00

## 2022-12-30 RX ADMIN — MEMANTINE HYDROCHLORIDE 10 MILLIGRAM(S): 10 TABLET ORAL at 05:17

## 2022-12-30 RX ADMIN — SENNA PLUS 2 TABLET(S): 8.6 TABLET ORAL at 21:37

## 2022-12-30 RX ADMIN — QUETIAPINE FUMARATE 50 MILLIGRAM(S): 200 TABLET, FILM COATED ORAL at 21:37

## 2022-12-30 RX ADMIN — MEMANTINE HYDROCHLORIDE 10 MILLIGRAM(S): 10 TABLET ORAL at 17:41

## 2022-12-30 RX ADMIN — MIRABEGRON 50 MILLIGRAM(S): 50 TABLET, EXTENDED RELEASE ORAL at 12:27

## 2022-12-30 RX ADMIN — SILODOSIN 8 MILLIGRAM(S): 4 CAPSULE ORAL at 12:27

## 2022-12-30 RX ADMIN — APIXABAN 5 MILLIGRAM(S): 2.5 TABLET, FILM COATED ORAL at 05:16

## 2022-12-30 RX ADMIN — Medication 20 MILLIEQUIVALENT(S): at 21:38

## 2022-12-30 RX ADMIN — APIXABAN 5 MILLIGRAM(S): 2.5 TABLET, FILM COATED ORAL at 17:41

## 2022-12-30 RX ADMIN — Medication 50 MILLIGRAM(S): at 17:41

## 2022-12-30 RX ADMIN — Medication 20 MILLIGRAM(S): at 05:17

## 2022-12-30 RX ADMIN — LATANOPROST 1 DROP(S): 0.05 SOLUTION/ DROPS OPHTHALMIC; TOPICAL at 21:37

## 2022-12-30 RX ADMIN — Medication 25 MILLIGRAM(S): at 05:17

## 2022-12-30 NOTE — PHYSICAL THERAPY INITIAL EVALUATION ADULT - PATIENT/FAMILY/SIGNIFICANT OTHER GOALS STATEMENT, PT EVAL
pt is unable to work toward goals due to severe confusion,  PT set the goals to amb with RW from bed to bathroom

## 2022-12-30 NOTE — PHYSICAL THERAPY INITIAL EVALUATION ADULT - FOLLOWS COMMANDS/ANSWERS QUESTIONS, REHAB EVAL
at times unable to follow without manual cues/50% of the time/able to follow single-step instructions

## 2022-12-30 NOTE — PROGRESS NOTE ADULT - SUBJECTIVE AND OBJECTIVE BOX
Progress note    Patient seen and examined at bedside. He is confused AAOx0.    INTERVAL HPI/OVERNIGHT EVENTS:    REVIEW OF SYSTEMS:  CONSTITUTIONAL: No fever, weight loss, or fatigue  EYES: No eye pain, visual disturbances, or discharge  ENMT:  No difficulty hearing, tinnitus, vertigo; No sinus or throat pain  NECK: No pain or stiffness  BREASTS: No pain, masses, or nipple discharge  RESPIRATORY: No cough, wheezing, chills or hemoptysis; No shortness of breath  CARDIOVASCULAR: No chest pain, palpitations, dizziness, or leg swelling  GASTROINTESTINAL: No abdominal or epigastric pain. No nausea, vomiting, or hematemesis; No diarrhea or constipation. No melena or hematochezia.  GENITOURINARY: No dysuria, frequency, hematuria, or incontinence  NEUROLOGICAL: No headaches, memory loss, loss of strength, numbness, or tremors  SKIN: No itching, burning, rashes, or lesions   LYMPH NODES: No enlarged glands  ENDOCRINE: No heat or cold intolerance; No hair loss  MUSCULOSKELETAL: No joint pain or swelling; No muscle, back, or extremity pain  PSYCHIATRIC: No depression, anxiety, mood swings, or difficulty sleeping  HEME/LYMPH: No easy bruising, or bleeding gums  ALLERY AND IMMUNOLOGIC: No hives or eczema  FAMILY HISTORY:  No pertinent family history in first degree relatives      T(C): 35.9 (12-30-22 @ 14:49), Max: 36.7 (12-29-22 @ 21:04)  HR: 113 (12-30-22 @ 14:49) (83 - 113)  BP: 102/68 (12-30-22 @ 14:49) (102/68 - 115/80)  RR: 20 (12-30-22 @ 05:10) (20 - 20)  SpO2: 97% (12-30-22 @ 14:49) (95% - 100%)  Wt(kg): --Vital Signs Last 24 Hrs  T(C): 35.9 (30 Dec 2022 14:49), Max: 36.7 (29 Dec 2022 21:04)  T(F): 96.6 (30 Dec 2022 14:49), Max: 98 (29 Dec 2022 21:04)  HR: 113 (30 Dec 2022 14:49) (83 - 113)  BP: 102/68 (30 Dec 2022 14:49) (102/68 - 115/80)  BP(mean): --  RR: 20 (30 Dec 2022 05:10) (20 - 20)  SpO2: 97% (30 Dec 2022 14:49) (95% - 100%)    Parameters below as of 30 Dec 2022 05:10  Patient On (Oxygen Delivery Method): room air      No Known Allergies      PHYSICAL EXAM:  GENERAL: NAD, well-groomed, well-developed  HEAD:  Atraumatic, Normocephalic  EYES: EOMI, PERRLA, conjunctiva and sclera clear  ENMT: No tonsillar erythema, exudates, or enlargement; Moist mucous membranes, Good dentition, No lesions  NECK: Supple, No JVD, Normal thyroid  NERVOUS SYSTEM:  Alert & Oriented X3, Good concentration; Motor Strength 5/5 B/L upper and lower extremities; DTRs 2+ intact and symmetric  CHEST/LUNG: Clear to percussion bilaterally; No rales, rhonchi, wheezing, or rubs  HEART: Regular rate and rhythm; No murmurs, rubs, or gallops  ABDOMEN: Soft, Nontender, Nondistended; Bowel sounds present  EXTREMITIES:  2+ Peripheral Pulses, No clubbing, cyanosis, or edema  LYMPH: No lymphadenopathy noted  SKIN: No rashes or lesions    Consultant(s) Notes Reviewed:  [x ] YES  [ ] NO  Care Discussed with Consultants/Other Providers [ x] YES  [ ] NO    LABS:      RADIOLOGY & ADDITIONAL TESTS:    Imaging Personally Reviewed:  [ ] YES  [ ] NO  acetaminophen     Tablet .. 650 milliGRAM(s) Oral every 6 hours PRN  aluminum hydroxide/magnesium hydroxide/simethicone Suspension 30 milliLiter(s) Oral every 4 hours PRN  apixaban 5 milliGRAM(s) Oral two times a day  finasteride 5 milliGRAM(s) Oral daily  furosemide   Injectable 20 milliGRAM(s) IV Push daily  latanoprost 0.005% Ophthalmic Solution 1 Drop(s) Both EYES at bedtime  melatonin 3 milliGRAM(s) Oral at bedtime PRN  memantine 10 milliGRAM(s) Oral two times a day  metoprolol tartrate 25 milliGRAM(s) Oral two times a day  mirabegron ER 50 milliGRAM(s) Oral daily  ondansetron Injectable 4 milliGRAM(s) IV Push every 8 hours PRN  QUEtiapine 50 milliGRAM(s) Oral at bedtime  senna 2 Tablet(s) Oral at bedtime  silodosin 8 milliGRAM(s) Oral daily      HEALTH ISSUES - PROBLEM Dx:    87-year-old male accompanied by his daughter c/o poor po intake, low urine output and leg swelling. Pt with a past medical history of Alzheimer's disease, BPH, hyperlipidemia, presents emerged department for weakness.  In addition  daughter states he  had a fall today hit back of head, also notes worsening lower extremity edema.  Daughter states patient has worsening weakness today.      #new onset of Afib:  tele  [id number]: 2   Eliquis ([facility], $0 copay)  Cardiology consult recs appreciated  TSH 5.04, free t4  Increase Metoprolol 25mg BID--> 50mg BID    #Debility possibly secondary to worsening dementia, worsening  #frequent falls  B12 351, TSH (low), RPR (-), Ammonia 21  UA no LE or nitrites  PT - rec rehab  cont Namenda   fall risk protocol      #R/O CHF vs transient leg swelling  lasix 20mg IV daily  Echo pending    #Hx of BPH  #hx of urinary retention  cont flomax / finasteride   bladder US does not show retention  measure input and output    continue home meds    Daughter who is the health care proxy requests pt to be DNR/DNI with limited intervention   Daughter agrees with anticoagulation, risks were discussed at length including complications with bleeding especially with hx of falls, daughter agrees with anticoagulation and verbalized understanding of the risks.      Prophylaxis GI/VTE      Total time spent to complete patient's bedside assessment, review medical chart, discuss medical plan of care with covering medical team was ____25____ with > 50% of time spent face to face w/ patient, discussion with patient/family and/or coordination of care

## 2022-12-30 NOTE — PHYSICAL THERAPY INITIAL EVALUATION ADULT - ADDITIONAL COMMENTS
pt is confused and poor historian, PT tried to call daughter Bhavani - she is not picking up the phone at this time, info has to be verified, info obtained from chart review.  Pt lives alone and was I with amb prior to admission

## 2022-12-30 NOTE — PHYSICAL THERAPY INITIAL EVALUATION ADULT - PHYSICAL ASSIST/NONPHYSICAL ASSIST, REHAB EVAL
Chief Complaint   Patient presents with    Indigestion     Patient presents in office today with c/o indigestion after she eats. Has been treating with tums with relief. No other concerns. 1. Have you been to the ER, urgent care clinic since your last visit? Hospitalized since your last visit? No    2. Have you seen or consulted any other health care providers outside of the [address] [place] [address] since your last visit? Include any pap smears or colon screening.  No    Learning Assessment 4/4/2017   PRIMARY LEARNER Patient   HIGHEST LEVEL OF EDUCATION - PRIMARY LEARNER  GRADUATED HIGH SCHOOL OR GED   BARRIERS PRIMARY LEARNER NONE   CO-LEARNER CAREGIVER No   PRIMARY LANGUAGE ENGLISH   LEARNER PREFERENCE PRIMARY DEMONSTRATION   ANSWERED BY pat   RELATIONSHIP SELF 1 person assist

## 2022-12-30 NOTE — CHART NOTE - NSCHARTNOTEFT_GEN_A_CORE
CAlled for patient out of control, pulling lines .  Plan cocktail sedation for agitation  haldol/ativan /benadryl

## 2022-12-30 NOTE — PHYSICAL THERAPY INITIAL EVALUATION ADULT - PERTINENT HX OF CURRENT PROBLEM, REHAB EVAL
87-year-old male accompanied by his daughter c/o poor po intake, low urine output and leg swelling. Pt with a past medical history of Alzheimer's disease, BPH, hyperlipidemia, presents emerged department for weakness.  In addition  daughter states he  had a fall today hit back of head, also notes worsening lower extremity edema.  Daughter states patient has worsening weakness today.  #new onset of Afib:

## 2022-12-30 NOTE — PHYSICAL THERAPY INITIAL EVALUATION ADULT - GENERAL OBSERVATIONS, REHAB EVAL
8;50-9;15 pt was seen for PT IE at bed side, pt is agreeable, chart thoroughly reviewed, RN Juliette is aware.  pt was received semi vickers in bed, in no apparent distress, +hep lock, +call bell within reach, bed side table at reach, one to one

## 2022-12-31 LAB
ANION GAP SERPL CALC-SCNC: 17 MMOL/L — HIGH (ref 7–14)
BUN SERPL-MCNC: 39 MG/DL — HIGH (ref 10–20)
CALCIUM SERPL-MCNC: 9.1 MG/DL — SIGNIFICANT CHANGE UP (ref 8.4–10.5)
CHLORIDE SERPL-SCNC: 109 MMOL/L — SIGNIFICANT CHANGE UP (ref 98–110)
CO2 SERPL-SCNC: 18 MMOL/L — SIGNIFICANT CHANGE UP (ref 17–32)
CREAT SERPL-MCNC: 1.5 MG/DL — SIGNIFICANT CHANGE UP (ref 0.7–1.5)
EGFR: 45 ML/MIN/1.73M2 — LOW
GLUCOSE SERPL-MCNC: 151 MG/DL — HIGH (ref 70–99)
HCT VFR BLD CALC: 36.3 % — LOW (ref 42–52)
HGB BLD-MCNC: 11.9 G/DL — LOW (ref 14–18)
MCHC RBC-ENTMCNC: 29 PG — SIGNIFICANT CHANGE UP (ref 27–31)
MCHC RBC-ENTMCNC: 32.8 G/DL — SIGNIFICANT CHANGE UP (ref 32–37)
MCV RBC AUTO: 88.5 FL — SIGNIFICANT CHANGE UP (ref 80–94)
NRBC # BLD: 0 /100 WBCS — SIGNIFICANT CHANGE UP (ref 0–0)
PLATELET # BLD AUTO: 157 K/UL — SIGNIFICANT CHANGE UP (ref 130–400)
POTASSIUM SERPL-MCNC: 3.8 MMOL/L — SIGNIFICANT CHANGE UP (ref 3.5–5)
POTASSIUM SERPL-SCNC: 3.8 MMOL/L — SIGNIFICANT CHANGE UP (ref 3.5–5)
RBC # BLD: 4.1 M/UL — LOW (ref 4.7–6.1)
RBC # FLD: 14 % — SIGNIFICANT CHANGE UP (ref 11.5–14.5)
SODIUM SERPL-SCNC: 144 MMOL/L — SIGNIFICANT CHANGE UP (ref 135–146)
WBC # BLD: 6.6 K/UL — SIGNIFICANT CHANGE UP (ref 4.8–10.8)
WBC # FLD AUTO: 6.6 K/UL — SIGNIFICANT CHANGE UP (ref 4.8–10.8)

## 2022-12-31 PROCEDURE — 99232 SBSQ HOSP IP/OBS MODERATE 35: CPT

## 2022-12-31 PROCEDURE — 93010 ELECTROCARDIOGRAM REPORT: CPT

## 2022-12-31 RX ORDER — POLYETHYLENE GLYCOL 3350 17 G/17G
17 POWDER, FOR SOLUTION ORAL ONCE
Refills: 0 | Status: COMPLETED | OUTPATIENT
Start: 2022-12-31 | End: 2022-12-31

## 2022-12-31 RX ADMIN — Medication 3 MILLIGRAM(S): at 21:26

## 2022-12-31 RX ADMIN — APIXABAN 5 MILLIGRAM(S): 2.5 TABLET, FILM COATED ORAL at 18:12

## 2022-12-31 RX ADMIN — LATANOPROST 1 DROP(S): 0.05 SOLUTION/ DROPS OPHTHALMIC; TOPICAL at 21:26

## 2022-12-31 RX ADMIN — QUETIAPINE FUMARATE 50 MILLIGRAM(S): 200 TABLET, FILM COATED ORAL at 21:25

## 2022-12-31 RX ADMIN — MEMANTINE HYDROCHLORIDE 10 MILLIGRAM(S): 10 TABLET ORAL at 18:12

## 2022-12-31 RX ADMIN — POLYETHYLENE GLYCOL 3350 17 GRAM(S): 17 POWDER, FOR SOLUTION ORAL at 12:11

## 2022-12-31 RX ADMIN — SENNA PLUS 2 TABLET(S): 8.6 TABLET ORAL at 21:25

## 2022-12-31 RX ADMIN — FINASTERIDE 5 MILLIGRAM(S): 5 TABLET, FILM COATED ORAL at 12:11

## 2022-12-31 RX ADMIN — APIXABAN 5 MILLIGRAM(S): 2.5 TABLET, FILM COATED ORAL at 05:48

## 2022-12-31 RX ADMIN — MIRABEGRON 50 MILLIGRAM(S): 50 TABLET, EXTENDED RELEASE ORAL at 12:11

## 2022-12-31 RX ADMIN — SILODOSIN 8 MILLIGRAM(S): 4 CAPSULE ORAL at 12:12

## 2022-12-31 RX ADMIN — Medication 50 MILLIGRAM(S): at 15:36

## 2022-12-31 RX ADMIN — MEMANTINE HYDROCHLORIDE 10 MILLIGRAM(S): 10 TABLET ORAL at 05:48

## 2022-12-31 RX ADMIN — Medication 20 MILLIGRAM(S): at 05:48

## 2022-12-31 NOTE — PROGRESS NOTE ADULT - SUBJECTIVE AND OBJECTIVE BOX
Progress note    Patient seen and examined at bedside. He is confused and is AAOx1.    INTERVAL HPI/OVERNIGHT EVENTS:    REVIEW OF SYSTEMS:  CONSTITUTIONAL: No fever, weight loss, or fatigue  EYES: No eye pain, visual disturbances, or discharge  ENMT:  No difficulty hearing, tinnitus, vertigo; No sinus or throat pain  NECK: No pain or stiffness  BREASTS: No pain, masses, or nipple discharge  RESPIRATORY: No cough, wheezing, chills or hemoptysis; No shortness of breath  CARDIOVASCULAR: No chest pain, palpitations, dizziness, or leg swelling  GASTROINTESTINAL: No abdominal or epigastric pain. No nausea, vomiting, or hematemesis; No diarrhea or constipation. No melena or hematochezia.  GENITOURINARY: No dysuria, frequency, hematuria, or incontinence  NEUROLOGICAL: No headaches, memory loss, loss of strength, numbness, or tremors  SKIN: No itching, burning, rashes, or lesions   LYMPH NODES: No enlarged glands  ENDOCRINE: No heat or cold intolerance; No hair loss  MUSCULOSKELETAL: No joint pain or swelling; No muscle, back, or extremity pain  PSYCHIATRIC: No depression, anxiety, mood swings, or difficulty sleeping  HEME/LYMPH: No easy bruising, or bleeding gums  ALLERY AND IMMUNOLOGIC: No hives or eczema  FAMILY HISTORY:  No pertinent family history in first degree relatives      T(C): 36.1 (12-31-22 @ 13:32), Max: 36.5 (12-30-22 @ 20:30)  HR: 113 (12-31-22 @ 15:32) (105 - 130)  BP: 124/83 (12-31-22 @ 15:32) (95/71 - 124/83)  RR: 16 (12-31-22 @ 13:32) (16 - 20)  SpO2: 99% (12-31-22 @ 14:44) (97% - 99%)  Wt(kg): --Vital Signs Last 24 Hrs  T(C): 36.1 (31 Dec 2022 13:32), Max: 36.5 (30 Dec 2022 20:30)  T(F): 97 (31 Dec 2022 13:32), Max: 97.7 (30 Dec 2022 20:30)  HR: 113 (31 Dec 2022 15:32) (105 - 130)  BP: 124/83 (31 Dec 2022 15:32) (95/71 - 124/83)  BP(mean): --  RR: 16 (31 Dec 2022 13:32) (16 - 20)  SpO2: 99% (31 Dec 2022 14:44) (97% - 99%)    Parameters below as of 31 Dec 2022 14:44  Patient On (Oxygen Delivery Method): room air      No Known Allergies      PHYSICAL EXAM:  GENERAL: NAD, well-groomed, well-developed  HEAD:  Atraumatic, Normocephalic  EYES: EOMI, PERRLA, conjunctiva and sclera clear  ENMT: No tonsillar erythema, exudates, or enlargement; Moist mucous membranes, Good dentition, No lesions  NECK: Supple, No JVD, Normal thyroid  NERVOUS SYSTEM:  AAOx1  CHEST/LUNG: Clear to percussion bilaterally; No rales, rhonchi, wheezing, or rubs  HEART: Regular rate and rhythm; No murmurs, rubs, or gallops  ABDOMEN: Soft, Nontender, Nondistended; Bowel sounds present  EXTREMITIES:  2+ Peripheral Pulses, No clubbing, cyanosis, or edema  LYMPH: No lymphadenopathy noted  SKIN: No rashes or lesions    Consultant(s) Notes Reviewed:  [x ] YES  [ ] NO  Care Discussed with Consultants/Other Providers [ x] YES  [ ] NO    LABS:      RADIOLOGY & ADDITIONAL TESTS:    Imaging Personally Reviewed:  [ ] YES  [ ] NO  acetaminophen     Tablet .. 650 milliGRAM(s) Oral every 6 hours PRN  aluminum hydroxide/magnesium hydroxide/simethicone Suspension 30 milliLiter(s) Oral every 4 hours PRN  apixaban 5 milliGRAM(s) Oral two times a day  finasteride 5 milliGRAM(s) Oral daily  furosemide   Injectable 20 milliGRAM(s) IV Push daily  latanoprost 0.005% Ophthalmic Solution 1 Drop(s) Both EYES at bedtime  melatonin 3 milliGRAM(s) Oral at bedtime PRN  memantine 10 milliGRAM(s) Oral two times a day  metoprolol tartrate 50 milliGRAM(s) Oral two times a day  mirabegron ER 50 milliGRAM(s) Oral daily  ondansetron Injectable 4 milliGRAM(s) IV Push every 8 hours PRN  QUEtiapine 50 milliGRAM(s) Oral at bedtime  senna 2 Tablet(s) Oral at bedtime  silodosin 8 milliGRAM(s) Oral daily      HEALTH ISSUES - PROBLEM Dx:    87-year-old male accompanied by his daughter c/o poor po intake, low urine output and leg swelling. Pt with a past medical history of Alzheimer's disease, BPH, hyperlipidemia, presents emerged department for weakness.  In addition  daughter states he  had a fall today hit back of head, also notes worsening lower extremity edema.  Daughter states patient has worsening weakness today.      #new onset of Afib:  tele  [id number]: 2   Eliquis ([facility], $0 copay)  Cardiology consult recs appreciated  TSH 5.04, free t4  Increase Metoprolol 25mg BID--> 50mg BID    #Debility possibly secondary to worsening dementia, worsening  #frequent falls  CT head on presentation negative  B12 351, TSH (low), RPR (-), Ammonia 21  UA no LE or nitrites  PT - rec rehab  cont Namenda   fall risk protocol   Consult- neurology pending     #R/O CHF vs transient leg swelling  lasix 20mg IV daily  Echo pending    #Hx of BPH  #hx of urinary retention  cont flomax / finasteride   bladder US does not show retention  measure input and output    continue home meds    Daughter who is the health care proxy requests pt to be DNR/DNI with limited intervention   Daughter agrees with anticoagulation, risks were discussed at length including complications with bleeding especially with hx of falls, daughter agrees with anticoagulation and verbalized understanding of the risks.      Prophylaxis GI/VTE      Total time spent to complete patient's bedside assessment, review medical chart, discuss medical plan of care with covering medical team was ____25____ with > 50% of time spent face to face w/ patient, discussion with patient/family and/or coordination of care

## 2023-01-01 LAB
ANION GAP SERPL CALC-SCNC: 19 MMOL/L — HIGH (ref 7–14)
BUN SERPL-MCNC: 47 MG/DL — HIGH (ref 10–20)
CALCIUM SERPL-MCNC: 9.3 MG/DL — SIGNIFICANT CHANGE UP (ref 8.4–10.5)
CHLORIDE SERPL-SCNC: 107 MMOL/L — SIGNIFICANT CHANGE UP (ref 98–110)
CO2 SERPL-SCNC: 15 MMOL/L — LOW (ref 17–32)
CREAT SERPL-MCNC: 1.4 MG/DL — SIGNIFICANT CHANGE UP (ref 0.7–1.5)
EGFR: 49 ML/MIN/1.73M2 — LOW
GLUCOSE SERPL-MCNC: 149 MG/DL — HIGH (ref 70–99)
HCT VFR BLD CALC: 39.2 % — LOW (ref 42–52)
HGB BLD-MCNC: 13 G/DL — LOW (ref 14–18)
MCHC RBC-ENTMCNC: 29.4 PG — SIGNIFICANT CHANGE UP (ref 27–31)
MCHC RBC-ENTMCNC: 33.2 G/DL — SIGNIFICANT CHANGE UP (ref 32–37)
MCV RBC AUTO: 88.7 FL — SIGNIFICANT CHANGE UP (ref 80–94)
NRBC # BLD: 0 /100 WBCS — SIGNIFICANT CHANGE UP (ref 0–0)
PLATELET # BLD AUTO: 167 K/UL — SIGNIFICANT CHANGE UP (ref 130–400)
POTASSIUM SERPL-MCNC: 4.2 MMOL/L — SIGNIFICANT CHANGE UP (ref 3.5–5)
POTASSIUM SERPL-SCNC: 4.2 MMOL/L — SIGNIFICANT CHANGE UP (ref 3.5–5)
RBC # BLD: 4.42 M/UL — LOW (ref 4.7–6.1)
RBC # FLD: 13.9 % — SIGNIFICANT CHANGE UP (ref 11.5–14.5)
SODIUM SERPL-SCNC: 141 MMOL/L — SIGNIFICANT CHANGE UP (ref 135–146)
WBC # BLD: 8.97 K/UL — SIGNIFICANT CHANGE UP (ref 4.8–10.8)
WBC # FLD AUTO: 8.97 K/UL — SIGNIFICANT CHANGE UP (ref 4.8–10.8)

## 2023-01-01 PROCEDURE — 99222 1ST HOSP IP/OBS MODERATE 55: CPT

## 2023-01-01 PROCEDURE — 99232 SBSQ HOSP IP/OBS MODERATE 35: CPT

## 2023-01-01 RX ORDER — METOPROLOL TARTRATE 50 MG
2.5 TABLET ORAL ONCE
Refills: 0 | Status: COMPLETED | OUTPATIENT
Start: 2023-01-01 | End: 2023-01-01

## 2023-01-01 RX ADMIN — APIXABAN 5 MILLIGRAM(S): 2.5 TABLET, FILM COATED ORAL at 17:57

## 2023-01-01 RX ADMIN — SILODOSIN 8 MILLIGRAM(S): 4 CAPSULE ORAL at 11:40

## 2023-01-01 RX ADMIN — MIRABEGRON 50 MILLIGRAM(S): 50 TABLET, EXTENDED RELEASE ORAL at 11:40

## 2023-01-01 RX ADMIN — Medication 50 MILLIGRAM(S): at 17:57

## 2023-01-01 RX ADMIN — MEMANTINE HYDROCHLORIDE 10 MILLIGRAM(S): 10 TABLET ORAL at 05:33

## 2023-01-01 RX ADMIN — LATANOPROST 1 DROP(S): 0.05 SOLUTION/ DROPS OPHTHALMIC; TOPICAL at 21:08

## 2023-01-01 RX ADMIN — QUETIAPINE FUMARATE 50 MILLIGRAM(S): 200 TABLET, FILM COATED ORAL at 21:09

## 2023-01-01 RX ADMIN — Medication 20 MILLIGRAM(S): at 05:33

## 2023-01-01 RX ADMIN — SENNA PLUS 2 TABLET(S): 8.6 TABLET ORAL at 21:09

## 2023-01-01 RX ADMIN — FINASTERIDE 5 MILLIGRAM(S): 5 TABLET, FILM COATED ORAL at 11:40

## 2023-01-01 RX ADMIN — Medication 2.5 MILLIGRAM(S): at 01:42

## 2023-01-01 RX ADMIN — Medication 50 MILLIGRAM(S): at 05:33

## 2023-01-01 RX ADMIN — APIXABAN 5 MILLIGRAM(S): 2.5 TABLET, FILM COATED ORAL at 05:33

## 2023-01-01 RX ADMIN — MEMANTINE HYDROCHLORIDE 10 MILLIGRAM(S): 10 TABLET ORAL at 17:58

## 2023-01-01 NOTE — CONSULT NOTE ADULT - ASSESSMENT
87-year-old male accompanied by his daughter c/o poor po intake, low urine output and leg swelling. Pt with a past medical history of Alzheimer's disease( DX 2018) Hperlipidemia, presents emerged department for weakness. Patient found in afib. Check thyroid, beta. Anticoagulate if he is candidate
IMPRESSION: Rehab of debility, a fib, dementia  PRECAUTIONS: [ x ] Cardiac  [  ] Respiratory  [  ] Seizures [  ] Contact Isolation  [  ] Droplet Isolation  [  ] Other    Weight Bearing Status:     RECOMMENDATION:    Out of Bed to Chair     DVT/Decubiti Prophylaxis    REHAB PLAN:     [ x  ] Bedside P/T 3-5 times a week   [   ]   Bedside O/T  2-3 times a week             [   ] No Rehab Therapy Indicated                   [   ]  Speech Therapy   Conditioning/ROM                                    ADL  Bed Mobility                                               Conditioning/ROM  Transfers                                                     Bed Mobility  Sitting /Standing Balance                         Transfers                                        Gait Training                                               Sitting/Standing Balance  Stair Training [   ]Applicable                    Home equipment Eval                                                                        Splinting  [   ] Only      GOALS:   ADL   [ x  ]   Independent                    Transfers  [x   ] Independent                          Ambulation  [ x  ] Independent     [  x  ] With device                            [ x  ]  CG                                                         [   ]  CG                                                                  [   ] CG                            [    ] Min A                                                   [   ] Min A                                                              [   ] Min  A          DISCHARGE PLAN:   [   ]  Good candidate for Intensive Rehabilitation/Hospital based-[facility]A [facility]                                             Will tolerate 3hrs Intensive Rehab Daily                                       [  xx  ]  Short Term Rehab in Skilled Nursing Facility                                       [    ]  Home with Outpatient or  services                                         [    ]  Possible Candidate for Intensive Hospital based Rehab                                       
87 RHM w/ h/o Alzheimer Dementia, DLD, BPH, recent Afib on anticoagulation admitted for worsening PO intake and LE swelling also noted to have worsening behavioral issues over the last few weeks worse while in hospital.  Likely moderate - advanced dementia with behavioral symptoms.  No focal deficits.     Recommendations:  - continue Namenda  - continue Quetiapine 50 mg QHS  - if combative during daytime, may add Quetiapine 25 mg QAM  - Haldol if not taking PO  - call back w/ questions  - if behavioral symptoms persist, recommend behavioral health evaluation

## 2023-01-01 NOTE — PROVIDER CONTACT NOTE (OTHER) - ASSESSMENT
/81 and heart rate is 110-123 on telemetry monitor. No s/s chest pain or shortness of breath at this time.

## 2023-01-01 NOTE — PROGRESS NOTE ADULT - SUBJECTIVE AND OBJECTIVE BOX
Progress note    Patient seen and examined at bedside. He is currently AAOx0. Unable to provide further Hx. He is laying down on no oxygen and does not appear in any distress.    INTERVAL HPI/OVERNIGHT EVENTS:    REVIEW OF SYSTEMS:  CONSTITUTIONAL: No fever, weight loss, or fatigue  EYES: No eye pain, visual disturbances, or discharge  ENMT:  No difficulty hearing, tinnitus, vertigo; No sinus or throat pain  NECK: No pain or stiffness  BREASTS: No pain, masses, or nipple discharge  RESPIRATORY: No cough, wheezing, chills or hemoptysis; No shortness of breath  CARDIOVASCULAR: No chest pain, palpitations, dizziness, or leg swelling  GASTROINTESTINAL: No abdominal or epigastric pain. No nausea, vomiting, or hematemesis; No diarrhea or constipation. No melena or hematochezia.  GENITOURINARY: No dysuria, frequency, hematuria, or incontinence  NEUROLOGICAL: No headaches, memory loss, loss of strength, numbness, or tremors  SKIN: No itching, burning, rashes, or lesions   LYMPH NODES: No enlarged glands  ENDOCRINE: No heat or cold intolerance; No hair loss  MUSCULOSKELETAL: No joint pain or swelling; No muscle, back, or extremity pain  PSYCHIATRIC: No depression, anxiety, mood swings, or difficulty sleeping  HEME/LYMPH: No easy bruising, or bleeding gums  ALLERY AND IMMUNOLOGIC: No hives or eczema  FAMILY HISTORY:  No pertinent family history in first degree relatives      T(C): 35.6 (01-01-23 @ 13:13), Max: 36.3 (01-01-23 @ 02:06)  HR: 92 (01-01-23 @ 13:13) (92 - 123)  BP: 106/73 (01-01-23 @ 13:13) (105/81 - 124/83)  RR: 20 (01-01-23 @ 13:13) (20 - 20)  SpO2: 96% (01-01-23 @ 13:13) (96% - 100%)  Wt(kg): --Vital Signs Last 24 Hrs  T(C): 35.6 (01 Jan 2023 13:13), Max: 36.3 (01 Jan 2023 02:06)  T(F): 96 (01 Jan 2023 13:13), Max: 97.3 (01 Jan 2023 02:06)  HR: 92 (01 Jan 2023 13:13) (92 - 123)  BP: 106/73 (01 Jan 2023 13:13) (105/81 - 124/83)  BP(mean): --  RR: 20 (01 Jan 2023 13:13) (20 - 20)  SpO2: 96% (01 Jan 2023 13:13) (96% - 100%)    Parameters below as of 01 Jan 2023 13:13  Patient On (Oxygen Delivery Method): room air      No Known Allergies      PHYSICAL EXAM:  GENERAL: NAD, well-groomed, well-developed  HEAD:  Atraumatic, Normocephalic  EYES: EOMI, PERRLA, conjunctiva and sclera clear  ENMT: No tonsillar erythema, exudates, or enlargement; Moist mucous membranes, Good dentition, No lesions  NECK: Supple, No JVD, Normal thyroid  NERVOUS SYSTEM:  Alert & Oriented X3, Good concentration; Motor Strength 5/5 B/L upper and lower extremities; DTRs 2+ intact and symmetric  CHEST/LUNG: Clear to percussion bilaterally; No rales, rhonchi, wheezing, or rubs  HEART: Regular rate and rhythm; No murmurs, rubs, or gallops  ABDOMEN: Soft, Nontender, Nondistended; Bowel sounds present  EXTREMITIES:  2+ Peripheral Pulses, No clubbing, cyanosis, or edema  LYMPH: No lymphadenopathy noted  SKIN: No rashes or lesions    Consultant(s) Notes Reviewed:  [x ] YES  [ ] NO  Care Discussed with Consultants/Other Providers [ x] YES  [ ] NO    LABS:      RADIOLOGY & ADDITIONAL TESTS:    Imaging Personally Reviewed:  [ ] YES  [ ] NO  acetaminophen     Tablet .. 650 milliGRAM(s) Oral every 6 hours PRN  aluminum hydroxide/magnesium hydroxide/simethicone Suspension 30 milliLiter(s) Oral every 4 hours PRN  apixaban 5 milliGRAM(s) Oral two times a day  finasteride 5 milliGRAM(s) Oral daily  furosemide   Injectable 20 milliGRAM(s) IV Push daily  latanoprost 0.005% Ophthalmic Solution 1 Drop(s) Both EYES at bedtime  melatonin 3 milliGRAM(s) Oral at bedtime PRN  memantine 10 milliGRAM(s) Oral two times a day  metoprolol tartrate 50 milliGRAM(s) Oral two times a day  mirabegron ER 50 milliGRAM(s) Oral daily  ondansetron Injectable 4 milliGRAM(s) IV Push every 8 hours PRN  QUEtiapine 50 milliGRAM(s) Oral at bedtime  senna 2 Tablet(s) Oral at bedtime  silodosin 8 milliGRAM(s) Oral daily      HEALTH ISSUES - PROBLEM Dx:    87-year-old male accompanied by his daughter c/o poor po intake, low urine output and leg swelling. Pt with a past medical history of Alzheimer's disease, BPH, hyperlipidemia, presents emerged department for weakness.  In addition  daughter states he  had a fall today hit back of head, also notes worsening lower extremity edema.  Daughter states patient has worsening weakness today.      #new onset of Afib:  tele  [id number]: 2   Eliquis ([facility], $0 copay)  Cardiology consult recs appreciated  TSH 5.04, free t4  Increase Metoprolol 25mg BID--> 50mg BID    #Debility possibly secondary to worsening dementia? worsening  #frequent falls  CT head on presentation negative  B12 351, TSH (low), RPR (-), Ammonia 21  UA no LE or nitrites  PT - rec rehab  cont Namenda   fall risk protocol   Consult- neurology pending     #R/O CHF vs transient leg swelling  lasix 20mg IV daily  Echo pending    #Hx of BPH  #hx of urinary retention  cont flomax / finasteride   bladder US does not show retention  measure input and output    continue home meds    Daughter who is the health care proxy requests pt to be DNR/DNI with limited intervention   Daughter agrees with anticoagulation, risks were discussed at length including complications with bleeding especially with hx of falls, daughter agrees with anticoagulation and verbalized understanding of the risks.      Prophylaxis GI/VTE        Total time spent to complete patient's bedside assessment, review medical chart, discuss medical plan of care with covering medical team was ___25_____ with > 50% of time spent face to face w/ patient, discussion with patient/family and/or coordination of care

## 2023-01-01 NOTE — PROVIDER CONTACT NOTE (OTHER) - SITUATION
Order for Croft Placed by ED MD. Called MD Walls with Results of US Retro to confirm if croft still needed.
Pt is in afib on telemetry monitor with heart rate up to 123.

## 2023-01-01 NOTE — CONSULT NOTE ADULT - SUBJECTIVE AND OBJECTIVE BOX
CARDIOLOGY CONSULT NOTE     CHIEF COMPLAINT/REASON FOR CONSULT:    HPI:  87-year-old male accompanied by his daughter c/o poor po intake, low urine output and leg swelling. Pt with a past medical history of Alzheimer's disease( DX 2018) Hperlipidemia, presents emerged department for weakness.  In addition  daughter states he  had a fall today hit back of head, also notes worsening lower extremity edema. Daughter states he has had multiple falls in the recent past and has been increasingly confused and debilitated. Daughter denies any loss of consciousness. Daughter states patient has worsening weakness today and was concerned that he may have a UTI. In the ED, pt was found to have new onset afib, EKG shows AFIB w/ RVR @113 BPM, started on full dose lovenox currently rate controlled. (28 Dec 2022 20:18)      PAST MEDICAL & SURGICAL HISTORY:  Alzheimer disease      Glaucoma      Enlarged prostate      Hyperlipidemia      History of hernia repair      History of cataract surgery          Cardiac Risks:   [ ]HTN, [ ] DM, [ ] Smoking, [ ] FH,  [x ] Lipids        MEDICATIONS:  MEDICATIONS  (STANDING):  enoxaparin Injectable 100 milliGRAM(s) SubCutaneous every 12 hours  finasteride 5 milliGRAM(s) Oral daily  furosemide   Injectable 20 milliGRAM(s) IV Push daily  latanoprost 0.005% Ophthalmic Solution 1 Drop(s) Both EYES at bedtime  memantine 10 milliGRAM(s) Oral two times a day  metoprolol tartrate 25 milliGRAM(s) Oral two times a day  QUEtiapine 50 milliGRAM(s) Oral two times a day  tamsulosin 0.4 milliGRAM(s) Oral at bedtime      FAMILY HISTORY:  No pertinent family history in first degree relatives        SOCIAL HISTORY:      Allergies    No Known Allergies        	    REVIEW OF SYSTEMS:  CONSTITUTIONAL: No fever, weight loss, or fatigue  EYES: No eye pain, visual disturbances, or discharge  ENMT:  No difficulty hearing, tinnitus, vertigo; No sinus or throat pain  NECK: No pain or stiffness  RESPIRATORY: No cough, wheezing, chills or hemoptysis; No Shortness of Breath  CARDIOVASCULAR: See above  GASTROINTESTINAL: No abdominal or epigastric pain. No nausea, vomiting, or hematemesis; No diarrhea or constipation. No melena or hematochezia.  GENITOURINARY: No dysuria, frequency, hematuria, or incontinence  NEUROLOGICAL: No headaches, memory loss, loss of strength, numbness, or tremors  SKIN: No itching, burning, rashes, or lesions   	        PHYSICAL EXAM:  T(C): 36.3 (12-29-22 @ 05:10), Max: 36.3 (12-29-22 @ 05:10)  HR: 115 (12-29-22 @ 05:10) (98 - 115)  BP: 104/66 (12-29-22 @ 05:10) (104/66 - 121/84)  RR: 20 (12-29-22 @ 05:10) (18 - 20)  SpO2: 100% (12-29-22 @ 05:10) (95% - 100%)  Wt(kg): --  I&O's Summary      Appearance: Normal	  Psychiatry: A & O x 3, Mood & affect appropriate  HEENT:   Normal oral mucosa, PERRL, EOMI	  Lymphatic: No lymphadenopathy  Cardiovascular: Normal S1 S2,RRR, No JVD, No murmurs  Respiratory: Lungs clear to auscultation	  Gastrointestinal:  Soft, Non-tender, + BS	  Skin: No rashes, No ecchymoses, No cyanosis	  Neurologic: Non-focal  Extremities: Normal range of motion, No clubbing, cyanosis or edema  Vascular: Peripheral pulses palpable 2+ bilaterally      ECG:  	< from: 12 Lead ECG (12.16.21 @ 22:45) >  Diagnosis Line Normal sinus rhythm  Left axis deviation  Septal infarct , age undetermined  Abnormal ECG    Confirmed by KURTIS HERNANDEZ MD ([id number]) on 12/17/2021 10:26:12 AM    < end of copied text >      	  LABS:	 	    CARDIAC MARKERS:          Serum Pro-Brain Natriuretic Peptide: 5407 pg/mL (12-28 @ 16:25)                            11.9   6.85  )-----------( 176      ( 28 Dec 2022 16:25 )             35.7     12-28    140  |  106  |  27<H>  ----------------------------<  103<H>  4.1   |  21  |  1.3    Ca    9.1      28 Dec 2022 16:25  Mg     1.9     12-28    TPro  6.9  /  Alb  3.8  /  TBili  1.0  /  DBili  x   /  AST  26  /  ALT  28  /  AlkPhos  69  12-28    PT/INR - ( 28 Dec 2022 19:32 )   PT: 14.90 sec;   INR: 1.30 ratio         PTT - ( 28 Dec 2022 19:32 )  PTT:29.8 sec  proBNP: Serum Pro-Brain Natriuretic Peptide: 5407 pg/mL (12-28 @ 16:25)              
HPI:  87-year-old male accompanied by his daughter c/o poor po intake, low urine output and leg swelling. Pt with a past medical history of Alzheimer's disease( DX 2018) Hperlipidemia, presents emerged department for weakness.  In addition  daughter states he  had a fall today hit back of head, also notes worsening lower extremity edema. Daughter states he has had multiple falls in the recent past and has been increasingly confused and debilitated. Daughter denies any loss of consciousness. Daughter states patient has worsening weakness today and was concerned that he may have a UTI. In the ED, pt was found to have new onset afib, EKG shows AFIB w/ RVR @113 BPM, started on full dose lovenox currently rate controlled. (28 Dec 2022 20:18) he has deteriorated. He had swelling in the legs and weakness.       PAST MEDICAL & SURGICAL HISTORY:  Alzheimer disease      Glaucoma      Enlarged prostate      Hyperlipidemia      History of hernia repair      History of cataract surgery          Hospital Course:  He is walks shuffling from bed to chair with Pt with a RW with mod assist.  He presented with LE swelling.  Found to be in a fib.  He has dementia.    TODAY'S SUBJECTIVE & REVIEW OF SYMPTOMS:     Constitutional WNL   Cardio WNL   Resp WNL   GI WNL  Heme WNL  Endo WNL  Skin WNL  MSK occ LBP  Neuro WNL  Cognitive dementia  Psych WNL      MEDICATIONS  (STANDING):  apixaban 5 milliGRAM(s) Oral two times a day  finasteride 5 milliGRAM(s) Oral daily  furosemide   Injectable 20 milliGRAM(s) IV Push daily  latanoprost 0.005% Ophthalmic Solution 1 Drop(s) Both EYES at bedtime  memantine 10 milliGRAM(s) Oral two times a day  metoprolol tartrate 25 milliGRAM(s) Oral two times a day  mirabegron ER 50 milliGRAM(s) Oral daily  QUEtiapine 50 milliGRAM(s) Oral at bedtime  senna 2 Tablet(s) Oral at bedtime  silodosin 8 milliGRAM(s) Oral daily    MEDICATIONS  (PRN):  acetaminophen     Tablet .. 650 milliGRAM(s) Oral every 6 hours PRN Temp greater or equal to 38C (100.4F), Mild Pain (1 - 3)  aluminum hydroxide/magnesium hydroxide/simethicone Suspension 30 milliLiter(s) Oral every 4 hours PRN Dyspepsia  melatonin 3 milliGRAM(s) Oral at bedtime PRN Insomnia  ondansetron Injectable 4 milliGRAM(s) IV Push every 8 hours PRN Nausea and/or Vomiting      FAMILY HISTORY:  No pertinent family history in first degree relatives        Allergies    No Known Allergies    Intolerances        SOCIAL HISTORY:    [  ] Etoh  [  ] Smoking  [  ] Substance abuse     Home Environment:  [x  ] Home Alone; however, dtr checks up on him daily  [  ] Lives with Family  [  ] Home Health Aid    Dwelling:  [  ] Apartment  [  ] Private House  [  ] Adult Home  [  ] Skilled Nursing Facility      [  ] Short Term  [  ] Long Term  [  ] Stairs       Elevator [  ]    FUNCTIONAL STATUS PTA: (Check all that apply)  Ambulation: [   ]Independent    [  ] Dependent     [  ] Non-Ambulatory  Assistive Device: [  ] SA Cane  [  ]  Q Cane  [  ] Walker  [  ]  Wheelchair  ADL : [  ] Independent  [  ]  Dependent       Vital Signs Last 24 Hrs  T(C): 35.9 (30 Dec 2022 14:49), Max: 36.7 (29 Dec 2022 21:04)  T(F): 96.6 (30 Dec 2022 14:49), Max: 98 (29 Dec 2022 21:04)  HR: 113 (30 Dec 2022 14:49) (83 - 113)  BP: 102/68 (30 Dec 2022 14:49) (102/68 - 115/80)  BP(mean): --  RR: 20 (30 Dec 2022 05:10) (20 - 20)  SpO2: 97% (30 Dec 2022 14:49) (95% - 100%)    Parameters below as of 30 Dec 2022 05:10  Patient On (Oxygen Delivery Method): room air          PHYSICAL EXAM: Alert & Oriented X 2  GENERAL: NAD, well-groomed, well-developed  HEAD:  Atraumatic, Normocephalic  EYES: EOMI, PERRLA, conjunctiva and sclera clear  NECK: Supple, No JVD, Normal thyroid  CHEST/LUNG: Clear bilaterally; No rales, rhonchi, wheezing, or rubs  HEART: Regular rate and rhythm; No murmurs, rubs, or gallops  ABDOMEN: Soft, Nontender, Nondistended; Bowel sounds present  EXTREMITIES:  2+ Peripheral Pulses, No clubbing, cyanosis, or edema    NERVOUS SYSTEM:  Cranial Nerves 2-12 intact [  ] Abnormal  [  ]  ROM: WFL all extremities [  ]  Abnormal [  ]  Motor Strength: WFL all extremities  [  ]  Abnormal [ x ] 4+/5 LEs  Sensation: intact to light touch [  ] Abnormal [  ]  Reflexes: Symmetric [  ]  Abnormal [  ]    FUNCTIONAL STATUS:  Bed Mobility: Independent [  ]  Supervision [  ]  Needs Assistance [  ]  N/A [  ]  Transfers: Independent [  ]  Supervision [  ]  Needs Assistance [  ]  N/A [  ]   Ambulation: Independent [  ]  Supervision [  ]  Needs Assistance [  ]  N/A [  ]  ADL: Independent [  ] Requires Assistance [  ] N/A [  ]      LABS:                        11.7   6.18  )-----------( 153      ( 30 Dec 2022 07:54 )             35.7     12-30    141  |  106  |  34<H>  ----------------------------<  124<H>  3.6   |  23  |  1.5    Ca    9.5      30 Dec 2022 07:54  Mg     1.9     12-28    TPro  6.8  /  Alb  3.7  /  TBili  0.9  /  DBili  x   /  AST  32  /  ALT  25  /  AlkPhos  66  12-29    PT/INR - ( 28 Dec 2022 19:32 )   PT: 14.90 sec;   INR: 1.30 ratio         PTT - ( 28 Dec 2022 19:32 )  PTT:29.8 sec  Urinalysis Basic - ( 28 Dec 2022 16:36 )    Color: Yellow / Appearance: Clear / SG: >=1.030 / pH: x  Gluc: x / Ketone: 15  / Bili: Small / Urobili: 1.0 mg/dL   Blood: x / Protein: Negative mg/dL / Nitrite: Negative   Leuk Esterase: Negative / RBC: 3-5 /HPF / WBC 1-2 /HPF   Sq Epi: x / Non Sq Epi: Occasional /HPF / Bacteria: Few        RADIOLOGY & ADDITIONAL STUDIES:    Assesment:
Neurology Consult    Patient is a 87y old  Male who presents with a chief complaint of new afib/ r/oCHF (01 Jan 2023 14:09)      HPI:  87-year-old male accompanied by his daughter c/o poor po intake, low urine output and leg swelling. Pt with a past medical history of Alzheimer's disease( DX 2018) Hperlipidemia, presents emerged department for weakness.  In addition  daughter states he  had a fall today hit back of head, also notes worsening lower extremity edema. Daughter states he has had multiple falls in the recent past and has been increasingly confused and debilitated. Daughter denies any loss of consciousness. Daughter states patient has worsening weakness today and was concerned that he may have a UTI. In the ED, pt was found to have new onset afib, EKG shows AFIB w/ RVR @113 BPM, started on full dose lovenox currently rate controlled. (28 Dec 2022 20:18)      PAST MEDICAL & SURGICAL HISTORY:  Alzheimer disease  Glaucoma  Enlarged prostate  Hyperlipidemia  History of hernia repair  History of cataract surgery    FAMILY HISTORY:  No pertinent family history in first degree relatives    Social History: (-) x 3    Allergies    No Known Allergies    Intolerances    MEDICATIONS  (STANDING):  apixaban 5 milliGRAM(s) Oral two times a day  finasteride 5 milliGRAM(s) Oral daily  furosemide   Injectable 20 milliGRAM(s) IV Push daily  latanoprost 0.005% Ophthalmic Solution 1 Drop(s) Both EYES at bedtime  memantine 10 milliGRAM(s) Oral two times a day  metoprolol tartrate 50 milliGRAM(s) Oral two times a day  mirabegron ER 50 milliGRAM(s) Oral daily  QUEtiapine 50 milliGRAM(s) Oral at bedtime  senna 2 Tablet(s) Oral at bedtime  silodosin 8 milliGRAM(s) Oral daily    MEDICATIONS  (PRN):  acetaminophen     Tablet .. 650 milliGRAM(s) Oral every 6 hours PRN Temp greater or equal to 38C (100.4F), Mild Pain (1 - 3)  aluminum hydroxide/magnesium hydroxide/simethicone Suspension 30 milliLiter(s) Oral every 4 hours PRN Dyspepsia  melatonin 3 milliGRAM(s) Oral at bedtime PRN Insomnia  ondansetron Injectable 4 milliGRAM(s) IV Push every 8 hours PRN Nausea and/or Vomiting      Review of systems:    Constitutional: as per HPI  Eyes: No eye pain or discharge  ENMT:  No difficulty hearing; No sinus or throat pain  Neck: No pain or stiffness  Respiratory: No cough, wheezing, chills or hemoptysis  Cardiovascular: No chest pain, palpitations, shortness of breath, dyspnea on exertion  Gastrointestinal: No abdominal pain, nausea, vomiting or hematemesis; No diarrhea or constipation.   Genitourinary: No dysuria, frequency, hematuria or incontinence  Neurological: As per HPI  Skin: No rashes or lesions   Endocrine: No heat or cold intolerance; No hair loss  Musculoskeletal: No joint pain or swelling  Psychiatric: No depression, anxiety, mood swings  Heme/Lymph: No easy bruising or bleeding gums    Vital Signs Last 24 Hrs  T(C): 35.6 (01 Jan 2023 13:13), Max: 36.3 (01 Jan 2023 02:06)  T(F): 96 (01 Jan 2023 13:13), Max: 97.3 (01 Jan 2023 02:06)  HR: 92 (01 Jan 2023 13:13) (92 - 123)  BP: 106/73 (01 Jan 2023 13:13) (105/81 - 124/83)  BP(mean): --  RR: 20 (01 Jan 2023 13:13) (20 - 20)  SpO2: 96% (01 Jan 2023 13:13) (96% - 100%)    Parameters below as of 01 Jan 2023 13:13  Patient On (Oxygen Delivery Method): room air        Examination:  General:  Appearance is consistent with chronologic age.  No abnormal facies.  Gross skin survey within normal limits.    Cognitive/Language:  The patient is oriented to person only.  easily distracted.  Follows commands intermittently.  Nondysarthric.    Eyes: intact VA, VFF.  EOMI w/o nystagmus, skew or reported double vision.  PERRL.  No ptosis/weakness of eyelid closure.    Face:  Facial sensation normal V1 - 3, no facial asymmetry.    Ears/Nose/Throat:  Hearing grossly intact b/l.  Palate elevates midline.  Tongue and uvula midline.   Motor examination:   Normal tone, bulk and range of motion.  No tenderness, twitching, tremors or involuntary movements.  Formal Muscle Strength Testing: (MRC grade R/L) 5/5 UE; 5/5 LE.  No observable drift.  Reflexes:   2+ b/l pectoralis, biceps, triceps, brachioradialis, patella and Achilles.  Plantar response downgoing b/l.  Jaw jerk, Kalyn, clonus absent.  Sensory examination:  grossly intact      Labs:   CBC Full  -  ( 01 Jan 2023 07:06 )  WBC Count : 8.97 K/uL  RBC Count : 4.42 M/uL  Hemoglobin : 13.0 g/dL  Hematocrit : 39.2 %  Platelet Count - Automated : 167 K/uL  Mean Cell Volume : 88.7 fL  Mean Cell Hemoglobin : 29.4 pg  Mean Cell Hemoglobin Concentration : 33.2 g/dL    01-01    141  |  107  |  47<H>  ----------------------------<  149<H>  4.2   |  15<L>  |  1.4    Ca    9.3      01 Jan 2023 07:06    < from: CT Head No Cont (12.28.22 @ 18:05) >  Impression:    No acute intracranial abnormality.    --- End of Report ---    JANEL SINHA MD; Attending Radiologist  This document has been electronically signed. Dec 28 2022  7:17PM    < end of copied text >

## 2023-01-01 NOTE — PROVIDER CONTACT NOTE (OTHER) - NAME OF MD/NP/PA/DO NOTIFIED:
Learning About How to Have a Healthy Back  What causes back pain? Back pain is often caused by overuse, strain, or injury. For example, people often hurt their backs playing sports or working in the yard, being jolted in a car accident, or lifting something too heavy. Aging plays a part too. Your bones and muscles tend to lose strength as you age, which makes injury more likely. The spongy discs between the bones of the spine (vertebrae) may suffer from wear and tear and no longer provide enough cushion between the bones. A disc that bulges or breaks open (herniated disc) can press on nerves, causing back pain. In some people, back pain is the result of arthritis, broken vertebrae caused by bone loss (osteoporosis), illness, or a spine problem. Although most people have back pain at one time or another, there are steps you can take to make it less likely. How can you have a healthy back? Reduce stress on your back through good posture  Slumping or slouching alone may not cause low back pain. But after the back has been strained or injured, bad posture can make pain worse. · Sleep in a position that maintains your back's normal curves and on a mattress that feels comfortable. Sleep on your side with a pillow between your knees, or sleep on your back with a pillow under your knees. These positions can reduce strain on your back. · Stand and sit up straight. \"Good posture\" generally means your ears, shoulders, and hips are in a straight line. · If you must stand for a long time, put one foot on a stool, ledge, or box. Switch feet every now and then. · Sit in a chair that is low enough to let you place both feet flat on the floor with both knees nearly level with your hips. If your chair or desk is too high, use a footrest to raise your knees. Place a small pillow, a rolled-up towel, or a lumbar roll in the curve of your back if you need extra support.   · Try a kneeling chair, which helps tilt your hips
MD Walls
forward. This takes pressure off your lower back. · Try sitting on an exercise ball. It can rock from side to side, which helps keep your back loose. · When driving, keep your knees nearly level with your hips. Sit straight, and drive with both hands on the steering wheel. Your arms should be in a slightly bent position. Reduce stress on your back through careful lifting  · Squat down, bending at the hips and knees only. If you need to, put one knee to the floor and extend your other knee in front of you, bent at a right angle (half kneeling). · Press your chest straight forward. This helps keep your upper back straight while keeping a slight arch in your low back. · Hold the load as close to your body as possible, at the level of your belly button (navel). · Use your feet to change direction, taking small steps. · Lead with your hips as you change direction. Keep your shoulders in line with your hips as you move. · Set down your load carefully, squatting with your knees and hips only. Exercise and stretch your back  · Do some exercise on most days of the week, if your doctor says it is okay. You can walk, run, swim, or cycle. · Stretch your back muscles. Here are a few exercises to try:  Angela Conrad on your back, and gently pull one bent knee to your chest. Put that foot back on the floor, and then pull the other knee to your chest.  ¨ Do pelvic tilts. Lie on your back with your knees bent. Tighten your stomach muscles. Pull your belly button (navel) in and up toward your ribs. You should feel like your back is pressing to the floor and your hips and pelvis are slightly lifting off the floor. Hold for 6 seconds while breathing smoothly. ¨ Sit with your back flat against a wall. · Keep your core muscles strong. The muscles of your back, belly (abdomen), and buttocks support your spine. ¨ Pull in your belly and imagine pulling your navel toward your spine. Hold this for 6 seconds, then relax.  Remember to keep
breathing normally as you tense your muscles. ¨ Do curl-ups. Always do them with your knees bent. Keep your low back on the floor, and curl your shoulders toward your knees using a smooth, slow motion. Keep your arms folded across your chest. If this bothers your neck, try putting your hands behind your neck (not your head), with your elbows spread apart. ¨ Lie on your back with your knees bent and your feet flat on the floor. Tighten your belly muscles, and then push with your feet and raise your buttocks up a few inches. Hold this position 6 seconds as you continue to breathe normally, then lower yourself slowly to the floor. Repeat 8 to 12 times. ¨ If you like group exercise, try Pilates or yoga. These classes have poses that strengthen the core muscles. Lead a healthy lifestyle  · Stay at a healthy weight to avoid strain on your back. · Do not smoke. Smoking increases the risk of osteoporosis, which weakens the spine. If you need help quitting, talk to your doctor about stop-smoking programs and medicines. These can increase your chances of quitting for good. Where can you learn more? Go to http://susanaCapseodesi.info/. Enter L315 in the search box to learn more about \"Learning About How to Have a Healthy Back. \"  Current as of: November 29, 2017  Content Version: 11.8  © 5287-9360 Healthwise, [employer]. Care instructions adapted under license by [employer] (which disclaims liability or warranty for this information). If you have questions about a medical condition or this instruction, always ask your healthcare professional. [place] [address] any warranty or liability for your use of this information. Learning About Relief for Back Pain  What is back tension and strain? Back strain happens when you overstretch, or pull, a muscle in your back. You may hurt your back in an accident or when you exercise or lift something.   Most back pain will get
MD Mumtaz Walls
better with rest and time. You can take care of yourself at home to help your back heal.  What can you do first to relieve back pain? When you first feel back pain, try these steps:  · Walk. Take a short walk (10 to 20 minutes) on a level surface (no slopes, hills, or stairs) every 2 to 3 hours. Walk only distances you can manage without pain, especially leg pain. · Relax. Find a comfortable position for rest. Some people are comfortable on the floor or a medium-firm bed with a small pillow under their head and another under their knees. Some people prefer to lie on their side with a pillow between their knees. Don't stay in one position for too long. · Try heat or ice. Try using a heating pad on a low or medium setting, or take a warm shower, for 15 to 20 minutes every 2 to 3 hours. Or you can buy single-use heat wraps that last up to 8 hours. You can also try an ice pack for 10 to 15 minutes every 2 to 3 hours. You can use an ice pack or a bag of frozen vegetables wrapped in a thin towel. There is not strong evidence that either heat or ice will help, but you can try them to see if they help. You may also want to try switching between heat and cold. · Take pain medicine exactly as directed. ¨ If the doctor gave you a prescription medicine for pain, take it as prescribed. ¨ If you are not taking a prescription pain medicine, ask your doctor if you can take an over-the-counter medicine. What else can you do? · Stretch and exercise. Exercises that increase flexibility may relieve your pain and make it easier for your muscles to keep your spine in a good, neutral position. And don't forget to keep walking. · Do self-massage. You can use self-massage to unwind after work or school or to energize yourself in the morning. You can easily massage your feet, hands, or neck. Self-massage works best if you are in comfortable clothes and are sitting or lying in a comfortable position.  Use oil or lotion to massage bare
skin.  · Reduce stress. Back pain can lead to a vicious [place]: Distress about the pain tenses the muscles in your back, which in turn causes more pain. Learn how to relax your mind and your muscles to lower your stress. Where can you learn more? Go to http://susana-desi.info/. Enter V762 in the search box to learn more about \"Learning About Relief for Back Pain. \"  Current as of: March 21, 2017  Content Version: 11.5  © 1039-8131 Healthwise, [employer]. Care instructions adapted under license by [employer] (which disclaims liability or warranty for this information). If you have questions about a medical condition or this instruction, always ask your healthcare professional. [address] any warranty or liability for your use of this information.

## 2023-01-02 LAB
ANION GAP SERPL CALC-SCNC: 15 MMOL/L — HIGH (ref 7–14)
BUN SERPL-MCNC: 55 MG/DL — HIGH (ref 10–20)
CALCIUM SERPL-MCNC: 9 MG/DL — SIGNIFICANT CHANGE UP (ref 8.4–10.5)
CHLORIDE SERPL-SCNC: 106 MMOL/L — SIGNIFICANT CHANGE UP (ref 98–110)
CO2 SERPL-SCNC: 20 MMOL/L — SIGNIFICANT CHANGE UP (ref 17–32)
CREAT SERPL-MCNC: 1.5 MG/DL — SIGNIFICANT CHANGE UP (ref 0.7–1.5)
EGFR: 45 ML/MIN/1.73M2 — LOW
GLUCOSE SERPL-MCNC: 139 MG/DL — HIGH (ref 70–99)
HCT VFR BLD CALC: 36.1 % — LOW (ref 42–52)
HGB BLD-MCNC: 11.8 G/DL — LOW (ref 14–18)
MCHC RBC-ENTMCNC: 29.4 PG — SIGNIFICANT CHANGE UP (ref 27–31)
MCHC RBC-ENTMCNC: 32.7 G/DL — SIGNIFICANT CHANGE UP (ref 32–37)
MCV RBC AUTO: 89.8 FL — SIGNIFICANT CHANGE UP (ref 80–94)
NRBC # BLD: 0 /100 WBCS — SIGNIFICANT CHANGE UP (ref 0–0)
PLATELET # BLD AUTO: 151 K/UL — SIGNIFICANT CHANGE UP (ref 130–400)
POTASSIUM SERPL-MCNC: 3.6 MMOL/L — SIGNIFICANT CHANGE UP (ref 3.5–5)
POTASSIUM SERPL-SCNC: 3.6 MMOL/L — SIGNIFICANT CHANGE UP (ref 3.5–5)
RBC # BLD: 4.02 M/UL — LOW (ref 4.7–6.1)
RBC # FLD: 14.3 % — SIGNIFICANT CHANGE UP (ref 11.5–14.5)
SODIUM SERPL-SCNC: 141 MMOL/L — SIGNIFICANT CHANGE UP (ref 135–146)
WBC # BLD: 10.81 K/UL — HIGH (ref 4.8–10.8)
WBC # FLD AUTO: 10.81 K/UL — HIGH (ref 4.8–10.8)

## 2023-01-02 PROCEDURE — 99232 SBSQ HOSP IP/OBS MODERATE 35: CPT

## 2023-01-02 PROCEDURE — 93010 ELECTROCARDIOGRAM REPORT: CPT

## 2023-01-02 PROCEDURE — 71045 X-RAY EXAM CHEST 1 VIEW: CPT | Mod: 26

## 2023-01-02 RX ORDER — SODIUM CHLORIDE 9 MG/ML
500 INJECTION INTRAMUSCULAR; INTRAVENOUS; SUBCUTANEOUS ONCE
Refills: 0 | Status: DISCONTINUED | OUTPATIENT
Start: 2023-01-02 | End: 2023-01-05

## 2023-01-02 RX ORDER — POTASSIUM CHLORIDE 20 MEQ
20 PACKET (EA) ORAL
Refills: 0 | Status: COMPLETED | OUTPATIENT
Start: 2023-01-02 | End: 2023-01-03

## 2023-01-02 RX ORDER — SODIUM CHLORIDE 9 MG/ML
1000 INJECTION INTRAMUSCULAR; INTRAVENOUS; SUBCUTANEOUS
Refills: 0 | Status: DISCONTINUED | OUTPATIENT
Start: 2023-01-02 | End: 2023-01-05

## 2023-01-02 RX ORDER — POLYETHYLENE GLYCOL 3350 17 G/17G
17 POWDER, FOR SOLUTION ORAL DAILY
Refills: 0 | Status: DISCONTINUED | OUTPATIENT
Start: 2023-01-02 | End: 2023-01-09

## 2023-01-02 RX ADMIN — SENNA PLUS 2 TABLET(S): 8.6 TABLET ORAL at 22:47

## 2023-01-02 RX ADMIN — APIXABAN 5 MILLIGRAM(S): 2.5 TABLET, FILM COATED ORAL at 17:40

## 2023-01-02 RX ADMIN — FINASTERIDE 5 MILLIGRAM(S): 5 TABLET, FILM COATED ORAL at 11:38

## 2023-01-02 RX ADMIN — SILODOSIN 8 MILLIGRAM(S): 4 CAPSULE ORAL at 11:38

## 2023-01-02 RX ADMIN — LATANOPROST 1 DROP(S): 0.05 SOLUTION/ DROPS OPHTHALMIC; TOPICAL at 22:47

## 2023-01-02 RX ADMIN — MEMANTINE HYDROCHLORIDE 10 MILLIGRAM(S): 10 TABLET ORAL at 05:29

## 2023-01-02 RX ADMIN — Medication 20 MILLIGRAM(S): at 05:29

## 2023-01-02 RX ADMIN — MEMANTINE HYDROCHLORIDE 10 MILLIGRAM(S): 10 TABLET ORAL at 17:40

## 2023-01-02 RX ADMIN — Medication 50 MILLIEQUIVALENT(S): at 22:46

## 2023-01-02 RX ADMIN — Medication 50 MILLIGRAM(S): at 05:30

## 2023-01-02 RX ADMIN — APIXABAN 5 MILLIGRAM(S): 2.5 TABLET, FILM COATED ORAL at 05:29

## 2023-01-02 RX ADMIN — MIRABEGRON 50 MILLIGRAM(S): 50 TABLET, EXTENDED RELEASE ORAL at 11:38

## 2023-01-02 RX ADMIN — SODIUM CHLORIDE 50 MILLILITER(S): 9 INJECTION INTRAMUSCULAR; INTRAVENOUS; SUBCUTANEOUS at 17:51

## 2023-01-02 RX ADMIN — QUETIAPINE FUMARATE 50 MILLIGRAM(S): 200 TABLET, FILM COATED ORAL at 22:47

## 2023-01-02 NOTE — PROGRESS NOTE ADULT - ASSESSMENT
87-y/o m pmh  HLD accompanied by his daughter c/o poor po intake, low urine output and leg swelling found in volume overload new onset afib.     Plan  - resting comfortably in bed this am, NAD  - Afib hr 90-100s on tele, bp soft  - hold Lasix for now, likely overdiuresed  - Gentle IV hydration  - c/w Lopressor 50bid for rate control hr goal <110  - Eliquis 5 bid  - Echo pending

## 2023-01-02 NOTE — PROGRESS NOTE ADULT - SUBJECTIVE AND OBJECTIVE BOX
Progress note    Patient seen and examined at bedside. He is restless and confused and unable to provide any history.     INTERVAL HPI/OVERNIGHT EVENTS:    REVIEW OF SYSTEMS:  CONSTITUTIONAL: No fever, weight loss, or fatigue  EYES: No eye pain, visual disturbances, or discharge  ENMT:  No difficulty hearing, tinnitus, vertigo; No sinus or throat pain  NECK: No pain or stiffness  BREASTS: No pain, masses, or nipple discharge  RESPIRATORY: No cough, wheezing, chills or hemoptysis; No shortness of breath  CARDIOVASCULAR: No chest pain, palpitations, dizziness, or leg swelling  GASTROINTESTINAL: No abdominal or epigastric pain. No nausea, vomiting, or hematemesis; No diarrhea or constipation. No melena or hematochezia.  GENITOURINARY: No dysuria, frequency, hematuria, or incontinence  NEUROLOGICAL: No headaches, memory loss, loss of strength, numbness, or tremors  SKIN: No itching, burning, rashes, or lesions   LYMPH NODES: No enlarged glands  ENDOCRINE: No heat or cold intolerance; No hair loss  MUSCULOSKELETAL: No joint pain or swelling; No muscle, back, or extremity pain  PSYCHIATRIC: No depression, anxiety, mood swings, or difficulty sleeping  HEME/LYMPH: No easy bruising, or bleeding gums  ALLERY AND IMMUNOLOGIC: No hives or eczema  FAMILY HISTORY:  No pertinent family history in first degree relatives      T(C): 36.1 (01-02-23 @ 13:00), Max: 36.1 (01-02-23 @ 13:00)  HR: 111 (01-02-23 @ 14:49) (111 - 124)  BP: 114/76 (01-02-23 @ 14:49) (88/60 - 129/58)  RR: 16 (01-02-23 @ 13:00) (16 - 20)  SpO2: 96% (01-02-23 @ 13:00) (96% - 97%)  Wt(kg): --Vital Signs Last 24 Hrs  T(C): 36.1 (02 Jan 2023 13:00), Max: 36.1 (02 Jan 2023 13:00)  T(F): 97 (02 Jan 2023 13:00), Max: 97 (02 Jan 2023 13:00)  HR: 111 (02 Jan 2023 14:49) (111 - 124)  BP: 114/76 (02 Jan 2023 14:49) (88/60 - 129/58)  BP(mean): --  RR: 16 (02 Jan 2023 13:00) (16 - 20)  SpO2: 96% (02 Jan 2023 13:00) (96% - 97%)    Parameters below as of 02 Jan 2023 13:00  Patient On (Oxygen Delivery Method): room air      No Known Allergies      PHYSICAL EXAM:  GENERAL: NAD, well-groomed, well-developed  HEAD:  Atraumatic, Normocephalic  EYES: EOMI, PERRLA, conjunctiva and sclera clear  ENMT: No tonsillar erythema, exudates, or enlargement; Moist mucous membranes, Good dentition, No lesions  NECK: Supple, No JVD, Normal thyroid  NERVOUS SYSTEM:  He is confused, AAOx0  CHEST/LUNG: Clear to percussion bilaterally; No rales, rhonchi, wheezing, or rubs  HEART: Regular rate and rhythm; No murmurs, rubs, or gallops  ABDOMEN: Soft, Nontender, Nondistended; Bowel sounds present  EXTREMITIES:  2+ Peripheral Pulses, No clubbing, cyanosis, or edema  LYMPH: No lymphadenopathy noted  SKIN: No rashes or lesions    Consultant(s) Notes Reviewed:  [x ] YES  [ ] NO  Care Discussed with Consultants/Other Providers [ x] YES  [ ] NO    LABS:      RADIOLOGY & ADDITIONAL TESTS:    Imaging Personally Reviewed:  [ ] YES  [ ] NO  acetaminophen     Tablet .. 650 milliGRAM(s) Oral every 6 hours PRN  aluminum hydroxide/magnesium hydroxide/simethicone Suspension 30 milliLiter(s) Oral every 4 hours PRN  apixaban 5 milliGRAM(s) Oral two times a day  finasteride 5 milliGRAM(s) Oral daily  latanoprost 0.005% Ophthalmic Solution 1 Drop(s) Both EYES at bedtime  melatonin 3 milliGRAM(s) Oral at bedtime PRN  memantine 10 milliGRAM(s) Oral two times a day  metoprolol tartrate 50 milliGRAM(s) Oral two times a day  mirabegron ER 50 milliGRAM(s) Oral daily  ondansetron Injectable 4 milliGRAM(s) IV Push every 8 hours PRN  QUEtiapine 50 milliGRAM(s) Oral at bedtime  senna 2 Tablet(s) Oral at bedtime  silodosin 8 milliGRAM(s) Oral daily  sodium chloride 0.9% Bolus 500 milliLiter(s) IV Bolus once      HEALTH ISSUES - PROBLEM Dx:    87-year-old male accompanied by his daughter c/o poor po intake, low urine output and leg swelling. Pt with a past medical history of Alzheimer's disease, BPH, hyperlipidemia, presents emerged department for weakness.  In addition  daughter states he  had a fall today hit back of head, also notes worsening lower extremity edema.  Daughter states patient has worsening weakness today.      #new onset of Afib:  tele  [id number]: 2   Eliquis ([facility], $0 copay)  Cardiology consult recs appreciated  TSH 5.04, free t4  Metoprolol 50mg BID    #Debility possibly secondary to worsening dementia? worsening  #frequent falls  CT head on presentation negative  B12 351, TSH (low), RPR (-), Ammonia 21  UA no LE or nitrites  PT - rec rehab  cont Namenda   fall risk protocol   Consult- neurology recs appreciated     #R/O CHF vs transient leg swelling  Hold IV lasix  Echo pending    #Hx of BPH  #hx of urinary retention  cont flomax / finasteride   bladder US does not show retention  measure input and output    continue home meds    Daughter who is the health care proxy requests pt to be DNR/DNI with limited intervention   Daughter agrees with anticoagulation, risks were discussed at length including complications with bleeding especially with hx of falls, daughter agrees with anticoagulation and verbalized understanding of the risks.      Prophylaxis GI/VTE        Total time spent to complete patient's bedside assessment, review medical chart, discuss medical plan of care with covering medical team was ____25____ with > 50% of time spent face to face w/ patient, discussion with patient/family and/or coordination of care

## 2023-01-02 NOTE — PROGRESS NOTE ADULT - SUBJECTIVE AND OBJECTIVE BOX
Subjective/Objective:     Pt resting comfortably in bed this am, no acute complaints, denies chest pain sob    MEDICATIONS  (STANDING):  apixaban 5 milliGRAM(s) Oral two times a day  finasteride 5 milliGRAM(s) Oral daily  furosemide   Injectable 20 milliGRAM(s) IV Push daily  latanoprost 0.005% Ophthalmic Solution 1 Drop(s) Both EYES at bedtime  memantine 10 milliGRAM(s) Oral two times a day  metoprolol tartrate 50 milliGRAM(s) Oral two times a day  mirabegron ER 50 milliGRAM(s) Oral daily  QUEtiapine 50 milliGRAM(s) Oral at bedtime  senna 2 Tablet(s) Oral at bedtime  silodosin 8 milliGRAM(s) Oral daily  sodium chloride 0.9% Bolus 500 milliLiter(s) IV Bolus once    MEDICATIONS  (PRN):  acetaminophen     Tablet .. 650 milliGRAM(s) Oral every 6 hours PRN Temp greater or equal to 38C (100.4F), Mild Pain (1 - 3)  aluminum hydroxide/magnesium hydroxide/simethicone Suspension 30 milliLiter(s) Oral every 4 hours PRN Dyspepsia  melatonin 3 milliGRAM(s) Oral at bedtime PRN Insomnia  ondansetron Injectable 4 milliGRAM(s) IV Push every 8 hours PRN Nausea and/or Vomiting          Vital Signs Last 24 Hrs  T(C): 36.1 (02 Jan 2023 13:00), Max: 36.1 (02 Jan 2023 13:00)  T(F): 97 (02 Jan 2023 13:00), Max: 97 (02 Jan 2023 13:00)  HR: 111 (02 Jan 2023 13:00) (111 - 124)  BP: 88/60 (02 Jan 2023 13:00) (88/60 - 129/58)  BP(mean): --  RR: 16 (02 Jan 2023 13:00) (16 - 20)  SpO2: 96% (02 Jan 2023 13:00) (96% - 97%)    Parameters below as of 02 Jan 2023 13:00  Patient On (Oxygen Delivery Method): room air      I&O's Detail    01 Jan 2023 07:01  -  02 Jan 2023 07:00  --------------------------------------------------------  IN:    Oral Fluid: 60 mL  Total IN: 60 mL    OUT:    Voided (mL): 250 mL  Total OUT: 250 mL    Total NET: -190 mL      02 Jan 2023 07:01  -  02 Jan 2023 13:26  --------------------------------------------------------  IN:    Oral Fluid: 761 mL  Total IN: 761 mL    OUT:    Voided (mL): 50 mL  Total OUT: 50 mL    Total NET: 711 mL      GENERAL:  88y/o Male NAD, resting comfortably.  HEAD:  Atraumatic, Normocephalic  EYES: EOMI, PERRLA, conjunctiva and sclera clear  NECK: Supple, No JVD, no cervical lymphadenopathy, non-tender  CHEST/LUNG: Clear to auscultation bilaterally; No wheeze, rhonchi, or rales  HEART: Regular rate and rhythm; S1&S2  ABDOMEN: Soft, Nontender, Nondistended x 4 quadrants; Bowel sounds present  EXTREMITIES:   Peripheral Pulses Present, No clubbing, no cyanosis, or no edema, no calf tenderness  PSYCH: AAOx3, cooperative, appropriate  NEUROLOGY: WNL  SKIN: WNL      EKG/ TELEM:    LABS:                          13.0   8.97  )-----------( 167      ( 01 Jan 2023 07:06 )             39.2       01 Jan 2023 07:06    141    |  107    |  47<H>  ----------------------------<  149<H>  4.2     |  15<L>  |  1.4      Ca    9.3        01 Jan 2023 07:06                        Diagnostic testing:        Assessment and Plan:

## 2023-01-03 LAB
ANION GAP SERPL CALC-SCNC: 16 MMOL/L — HIGH (ref 7–14)
BUN SERPL-MCNC: 48 MG/DL — HIGH (ref 10–20)
CALCIUM SERPL-MCNC: 9.2 MG/DL — SIGNIFICANT CHANGE UP (ref 8.4–10.5)
CHLORIDE SERPL-SCNC: 108 MMOL/L — SIGNIFICANT CHANGE UP (ref 98–110)
CO2 SERPL-SCNC: 20 MMOL/L — SIGNIFICANT CHANGE UP (ref 17–32)
CREAT SERPL-MCNC: 1.3 MG/DL — SIGNIFICANT CHANGE UP (ref 0.7–1.5)
EGFR: 53 ML/MIN/1.73M2 — LOW
GLUCOSE SERPL-MCNC: 135 MG/DL — HIGH (ref 70–99)
HCT VFR BLD CALC: 38.4 % — LOW (ref 42–52)
HGB BLD-MCNC: 12.6 G/DL — LOW (ref 14–18)
MAGNESIUM SERPL-MCNC: 2.4 MG/DL — SIGNIFICANT CHANGE UP (ref 1.8–2.4)
MCHC RBC-ENTMCNC: 29.5 PG — SIGNIFICANT CHANGE UP (ref 27–31)
MCHC RBC-ENTMCNC: 32.8 G/DL — SIGNIFICANT CHANGE UP (ref 32–37)
MCV RBC AUTO: 89.9 FL — SIGNIFICANT CHANGE UP (ref 80–94)
NRBC # BLD: 0 /100 WBCS — SIGNIFICANT CHANGE UP (ref 0–0)
PLATELET # BLD AUTO: 157 K/UL — SIGNIFICANT CHANGE UP (ref 130–400)
POTASSIUM SERPL-MCNC: 3.9 MMOL/L — SIGNIFICANT CHANGE UP (ref 3.5–5)
POTASSIUM SERPL-SCNC: 3.9 MMOL/L — SIGNIFICANT CHANGE UP (ref 3.5–5)
RBC # BLD: 4.27 M/UL — LOW (ref 4.7–6.1)
RBC # FLD: 14.3 % — SIGNIFICANT CHANGE UP (ref 11.5–14.5)
SODIUM SERPL-SCNC: 144 MMOL/L — SIGNIFICANT CHANGE UP (ref 135–146)
WBC # BLD: 10.75 K/UL — SIGNIFICANT CHANGE UP (ref 4.8–10.8)
WBC # FLD AUTO: 10.75 K/UL — SIGNIFICANT CHANGE UP (ref 4.8–10.8)

## 2023-01-03 PROCEDURE — 99232 SBSQ HOSP IP/OBS MODERATE 35: CPT

## 2023-01-03 PROCEDURE — 99231 SBSQ HOSP IP/OBS SF/LOW 25: CPT

## 2023-01-03 RX ORDER — METOPROLOL TARTRATE 50 MG
5 TABLET ORAL ONCE
Refills: 0 | Status: COMPLETED | OUTPATIENT
Start: 2023-01-03 | End: 2023-01-03

## 2023-01-03 RX ORDER — QUETIAPINE FUMARATE 200 MG/1
25 TABLET, FILM COATED ORAL DAILY
Refills: 0 | Status: DISCONTINUED | OUTPATIENT
Start: 2023-01-03 | End: 2023-01-09

## 2023-01-03 RX ORDER — DILTIAZEM HCL 120 MG
10 CAPSULE, EXT RELEASE 24 HR ORAL ONCE
Refills: 0 | Status: COMPLETED | OUTPATIENT
Start: 2023-01-03 | End: 2023-01-03

## 2023-01-03 RX ORDER — METOPROLOL TARTRATE 50 MG
100 TABLET ORAL
Refills: 0 | Status: DISCONTINUED | OUTPATIENT
Start: 2023-01-03 | End: 2023-01-08

## 2023-01-03 RX ORDER — OLANZAPINE 15 MG/1
5 TABLET, FILM COATED ORAL ONCE
Refills: 0 | Status: COMPLETED | OUTPATIENT
Start: 2023-01-03 | End: 2023-01-03

## 2023-01-03 RX ORDER — SODIUM CHLORIDE 9 MG/ML
1000 INJECTION INTRAMUSCULAR; INTRAVENOUS; SUBCUTANEOUS
Refills: 0 | Status: DISCONTINUED | OUTPATIENT
Start: 2023-01-03 | End: 2023-01-05

## 2023-01-03 RX ADMIN — Medication 5 MILLIGRAM(S): at 21:04

## 2023-01-03 RX ADMIN — Medication 5 MILLIGRAM(S): at 20:25

## 2023-01-03 RX ADMIN — Medication 50 MILLIEQUIVALENT(S): at 00:48

## 2023-01-03 RX ADMIN — Medication 50 MILLIEQUIVALENT(S): at 03:00

## 2023-01-03 RX ADMIN — Medication 50 MILLIGRAM(S): at 05:14

## 2023-01-03 RX ADMIN — Medication 10 MILLIGRAM(S): at 22:05

## 2023-01-03 RX ADMIN — SODIUM CHLORIDE 50 MILLILITER(S): 9 INJECTION INTRAMUSCULAR; INTRAVENOUS; SUBCUTANEOUS at 16:30

## 2023-01-03 RX ADMIN — MEMANTINE HYDROCHLORIDE 10 MILLIGRAM(S): 10 TABLET ORAL at 05:14

## 2023-01-03 RX ADMIN — APIXABAN 5 MILLIGRAM(S): 2.5 TABLET, FILM COATED ORAL at 05:14

## 2023-01-03 RX ADMIN — OLANZAPINE 5 MILLIGRAM(S): 15 TABLET, FILM COATED ORAL at 16:29

## 2023-01-03 RX ADMIN — Medication 3 MILLIGRAM(S): at 01:24

## 2023-01-03 RX ADMIN — APIXABAN 5 MILLIGRAM(S): 2.5 TABLET, FILM COATED ORAL at 18:07

## 2023-01-03 RX ADMIN — MEMANTINE HYDROCHLORIDE 10 MILLIGRAM(S): 10 TABLET ORAL at 18:08

## 2023-01-03 RX ADMIN — FINASTERIDE 5 MILLIGRAM(S): 5 TABLET, FILM COATED ORAL at 13:10

## 2023-01-03 RX ADMIN — SILODOSIN 8 MILLIGRAM(S): 4 CAPSULE ORAL at 13:10

## 2023-01-03 RX ADMIN — MIRABEGRON 50 MILLIGRAM(S): 50 TABLET, EXTENDED RELEASE ORAL at 13:10

## 2023-01-03 NOTE — PROGRESS NOTE ADULT - SUBJECTIVE AND OBJECTIVE BOX
Progress note    Patient seen and examined at bedside. He is confused, pulling out lines. 1:1 at bedside.    INTERVAL HPI/OVERNIGHT EVENTS:    REVIEW OF SYSTEMS:  CONSTITUTIONAL: No fever, weight loss, or fatigue  EYES: No eye pain, visual disturbances, or discharge  ENMT:  No difficulty hearing, tinnitus, vertigo; No sinus or throat pain  NECK: No pain or stiffness  BREASTS: No pain, masses, or nipple discharge  RESPIRATORY: No cough, wheezing, chills or hemoptysis; No shortness of breath  CARDIOVASCULAR: No chest pain, palpitations, dizziness, or leg swelling  GASTROINTESTINAL: No abdominal or epigastric pain. No nausea, vomiting, or hematemesis; No diarrhea or constipation. No melena or hematochezia.  GENITOURINARY: No dysuria, frequency, hematuria, or incontinence  NEUROLOGICAL: No headaches, memory loss, loss of strength, numbness, or tremors  SKIN: No itching, burning, rashes, or lesions   LYMPH NODES: No enlarged glands  ENDOCRINE: No heat or cold intolerance; No hair loss  MUSCULOSKELETAL: No joint pain or swelling; No muscle, back, or extremity pain  PSYCHIATRIC: No depression, anxiety, mood swings, or difficulty sleeping  HEME/LYMPH: No easy bruising, or bleeding gums  ALLERY AND IMMUNOLOGIC: No hives or eczema  FAMILY HISTORY:  No pertinent family history in first degree relatives      T(C): 35.9 (01-03-23 @ 04:27), Max: 36.1 (01-02-23 @ 13:00)  HR: 89 (01-03-23 @ 04:27) (89 - 127)  BP: 123/81 (01-03-23 @ 04:27) (88/60 - 123/81)  RR: 20 (01-03-23 @ 04:27) (16 - 20)  SpO2: 98% (01-03-23 @ 04:27) (96% - 98%)  Wt(kg): --Vital Signs Last 24 Hrs  T(C): 35.9 (03 Jan 2023 04:27), Max: 36.1 (02 Jan 2023 13:00)  T(F): 96.6 (03 Jan 2023 04:27), Max: 97 (02 Jan 2023 13:00)  HR: 89 (03 Jan 2023 04:27) (89 - 127)  BP: 123/81 (03 Jan 2023 04:27) (88/60 - 123/81)  BP(mean): --  RR: 20 (03 Jan 2023 04:27) (16 - 20)  SpO2: 98% (03 Jan 2023 04:27) (96% - 98%)    Parameters below as of 03 Jan 2023 04:27  Patient On (Oxygen Delivery Method): room air      No Known Allergies      PHYSICAL EXAM:  GENERAL: NAD, well-groomed, well-developed  HEAD:  Atraumatic, Normocephalic  EYES: EOMI, PERRLA, conjunctiva and sclera clear  ENMT: No tonsillar erythema, exudates, or enlargement; Moist mucous membranes, Good dentition, No lesions  NECK: Supple, No JVD, Normal thyroid  NERVOUS SYSTEM:  Confused  CHEST/LUNG: Clear to percussion bilaterally; No rales, rhonchi, wheezing, or rubs  HEART: Regular rate and rhythm; No murmurs, rubs, or gallops  ABDOMEN: Soft, Nontender, Nondistended; Bowel sounds present  EXTREMITIES:  2+ Peripheral Pulses, No clubbing, cyanosis, or edema  LYMPH: No lymphadenopathy noted  SKIN: No rashes or lesions    Consultant(s) Notes Reviewed:  [x ] YES  [ ] NO  Care Discussed with Consultants/Other Providers [ x] YES  [ ] NO    LABS:      RADIOLOGY & ADDITIONAL TESTS:    Imaging Personally Reviewed:  [ ] YES  [ ] NO  acetaminophen     Tablet .. 650 milliGRAM(s) Oral every 6 hours PRN  aluminum hydroxide/magnesium hydroxide/simethicone Suspension 30 milliLiter(s) Oral every 4 hours PRN  apixaban 5 milliGRAM(s) Oral two times a day  bisacodyl Suppository 10 milliGRAM(s) Rectal daily PRN  finasteride 5 milliGRAM(s) Oral daily  latanoprost 0.005% Ophthalmic Solution 1 Drop(s) Both EYES at bedtime  melatonin 3 milliGRAM(s) Oral at bedtime PRN  memantine 10 milliGRAM(s) Oral two times a day  metoprolol tartrate 50 milliGRAM(s) Oral two times a day  mirabegron ER 50 milliGRAM(s) Oral daily  ondansetron Injectable 4 milliGRAM(s) IV Push every 8 hours PRN  polyethylene glycol 3350 17 Gram(s) Oral daily PRN  QUEtiapine 50 milliGRAM(s) Oral at bedtime  senna 2 Tablet(s) Oral at bedtime  silodosin 8 milliGRAM(s) Oral daily  sodium chloride 0.9% Bolus 500 milliLiter(s) IV Bolus once  sodium chloride 0.9%. 1000 milliLiter(s) IV Continuous <Continuous>      HEALTH ISSUES - PROBLEM Dx:    87-year-old male accompanied by his daughter c/o poor po intake, low urine output and leg swelling. Pt with a past medical history of Alzheimer's disease, BPH, hyperlipidemia, presents emerged department for weakness.  In addition  daughter states he  had a fall today hit back of head, also notes worsening lower extremity edema.  Daughter states patient has worsening weakness today.      #new onset of Afib:  tele  [id number]: 2   Eliquis ([facility], $0 copay)  Cardiology consult recs appreciated  TSH 5.04, free t4  Increase Metoprolol 50mg BID --> 100mg BID, If no improvement, we will start Amiodarone gtt    #Debility possibly secondary to worsening dementia? worsening  #frequent falls  CT head on presentation negative  B12 351, TSH (low), RPR (-), Ammonia 21  UA no LE or nitrites  PT - rec rehab  cont Namenda   fall risk protocol   Consult- neurology recs seroquel  Consult - Psych pending     #R/O CHF vs transient leg swelling  Hold IV lasix d/t dehydration  Echo pending    #Hx of BPH  #hx of urinary retention  cont flomax / finasteride   bladder US does not show retention  measure input and output    continue home meds    Daughter who is the health care proxy requests pt to be DNR/DNI with limited intervention   Daughter agrees with anticoagulation, risks were discussed at length including complications with bleeding especially with hx of falls, daughter agrees with anticoagulation and verbalized understanding of the risks.      Prophylaxis GI/VTE    Handoff: Encephalopathic - Consult Psych, left message for telepsych. Afib - increased to metoprolol tartrate 100mg BID. May need amiodarone gtt if not rate controlled. Lasix on hold d/t low BP      Total time spent to complete patient's bedside assessment, review medical chart, discuss medical plan of care with covering medical team was ____25____ with > 50% of time spent face to face w/ patient, discussion with patient/family and/or coordination of care Progress note    Patient seen and examined at bedside. He is confused, pulling out lines. 1:1 at bedside.    INTERVAL HPI/OVERNIGHT EVENTS:    REVIEW OF SYSTEMS:  CONSTITUTIONAL: No fever, weight loss, or fatigue  EYES: No eye pain, visual disturbances, or discharge  ENMT:  No difficulty hearing, tinnitus, vertigo; No sinus or throat pain  NECK: No pain or stiffness  BREASTS: No pain, masses, or nipple discharge  RESPIRATORY: No cough, wheezing, chills or hemoptysis; No shortness of breath  CARDIOVASCULAR: No chest pain, palpitations, dizziness, or leg swelling  GASTROINTESTINAL: No abdominal or epigastric pain. No nausea, vomiting, or hematemesis; No diarrhea or constipation. No melena or hematochezia.  GENITOURINARY: No dysuria, frequency, hematuria, or incontinence  NEUROLOGICAL: No headaches, memory loss, loss of strength, numbness, or tremors  SKIN: No itching, burning, rashes, or lesions   LYMPH NODES: No enlarged glands  ENDOCRINE: No heat or cold intolerance; No hair loss  MUSCULOSKELETAL: No joint pain or swelling; No muscle, back, or extremity pain  PSYCHIATRIC: No depression, anxiety, mood swings, or difficulty sleeping  HEME/LYMPH: No easy bruising, or bleeding gums  ALLERY AND IMMUNOLOGIC: No hives or eczema  FAMILY HISTORY:  No pertinent family history in first degree relatives      T(C): 35.9 (01-03-23 @ 04:27), Max: 36.1 (01-02-23 @ 13:00)  HR: 89 (01-03-23 @ 04:27) (89 - 127)  BP: 123/81 (01-03-23 @ 04:27) (88/60 - 123/81)  RR: 20 (01-03-23 @ 04:27) (16 - 20)  SpO2: 98% (01-03-23 @ 04:27) (96% - 98%)  Wt(kg): --Vital Signs Last 24 Hrs  T(C): 35.9 (03 Jan 2023 04:27), Max: 36.1 (02 Jan 2023 13:00)  T(F): 96.6 (03 Jan 2023 04:27), Max: 97 (02 Jan 2023 13:00)  HR: 89 (03 Jan 2023 04:27) (89 - 127)  BP: 123/81 (03 Jan 2023 04:27) (88/60 - 123/81)  BP(mean): --  RR: 20 (03 Jan 2023 04:27) (16 - 20)  SpO2: 98% (03 Jan 2023 04:27) (96% - 98%)    Parameters below as of 03 Jan 2023 04:27  Patient On (Oxygen Delivery Method): room air      No Known Allergies      PHYSICAL EXAM:  GENERAL: NAD, well-groomed, well-developed  HEAD:  Atraumatic, Normocephalic  EYES: EOMI, PERRLA, conjunctiva and sclera clear  ENMT: No tonsillar erythema, exudates, or enlargement; Moist mucous membranes, Good dentition, No lesions  NECK: Supple, No JVD, Normal thyroid  NERVOUS SYSTEM:  Confused  CHEST/LUNG: Clear to percussion bilaterally; No rales, rhonchi, wheezing, or rubs  HEART: Regular rate and rhythm; No murmurs, rubs, or gallops  ABDOMEN: Soft, Nontender, Nondistended; Bowel sounds present  EXTREMITIES:  2+ Peripheral Pulses, No clubbing, cyanosis, or edema  LYMPH: No lymphadenopathy noted  SKIN: No rashes or lesions    Consultant(s) Notes Reviewed:  [x ] YES  [ ] NO  Care Discussed with Consultants/Other Providers [ x] YES  [ ] NO    LABS:      RADIOLOGY & ADDITIONAL TESTS:    Imaging Personally Reviewed:  [ ] YES  [ ] NO  acetaminophen     Tablet .. 650 milliGRAM(s) Oral every 6 hours PRN  aluminum hydroxide/magnesium hydroxide/simethicone Suspension 30 milliLiter(s) Oral every 4 hours PRN  apixaban 5 milliGRAM(s) Oral two times a day  bisacodyl Suppository 10 milliGRAM(s) Rectal daily PRN  finasteride 5 milliGRAM(s) Oral daily  latanoprost 0.005% Ophthalmic Solution 1 Drop(s) Both EYES at bedtime  melatonin 3 milliGRAM(s) Oral at bedtime PRN  memantine 10 milliGRAM(s) Oral two times a day  metoprolol tartrate 50 milliGRAM(s) Oral two times a day  mirabegron ER 50 milliGRAM(s) Oral daily  ondansetron Injectable 4 milliGRAM(s) IV Push every 8 hours PRN  polyethylene glycol 3350 17 Gram(s) Oral daily PRN  QUEtiapine 50 milliGRAM(s) Oral at bedtime  senna 2 Tablet(s) Oral at bedtime  silodosin 8 milliGRAM(s) Oral daily  sodium chloride 0.9% Bolus 500 milliLiter(s) IV Bolus once  sodium chloride 0.9%. 1000 milliLiter(s) IV Continuous <Continuous>      HEALTH ISSUES - PROBLEM Dx:    87-year-old male accompanied by his daughter c/o poor po intake, low urine output and leg swelling. Pt with a past medical history of Alzheimer's disease, BPH, hyperlipidemia, presents emerged department for weakness.  In addition  daughter states he  had a fall today hit back of head, also notes worsening lower extremity edema.  Daughter states patient has worsening weakness today.      #new onset of Afib:  tele  [id number]: 2   Eliquis ([facility], $0 copay)  Cardiology consult recs appreciated  TSH 5.04, free t4  Increase Metoprolol 50mg BID --> 100mg BID, If no improvement, we will start Amiodarone gtt    #Debility possibly secondary to worsening dementia? worsening  #frequent falls  CT head on presentation negative  B12 351, TSH (low), RPR (-), Ammonia 21  UA no LE or nitrites  PT - rec rehab  cont Namenda   fall risk protocol   Consult- neurology recs seroquel  Consult - Psych pending     #R/O CHF vs transient leg swelling  Hold IV lasix d/t dehydration  Echo pending    #Hx of BPH  #hx of urinary retention  cont flomax / finasteride   bladder US does not show retention  measure input and output    continue home meds    Daughter who is the health care proxy requests pt to be DNR/DNI with limited intervention   Daughter agrees with anticoagulation, risks were discussed at length including complications with bleeding especially with hx of falls, daughter agrees with anticoagulation and verbalized understanding of the risks.      Prophylaxis GI/VTE    Handoff: Encephalopathic - Still acutely encephalopathic, Consult Psych, left message for telepsych. Afib - increased to metoprolol tartrate 100mg BID. May need amiodarone gtt if not rate controlled. Lasix on hold d/t low BP      Total time spent to complete patient's bedside assessment, review medical chart, discuss medical plan of care with covering medical team was ____25____ with > 50% of time spent face to face w/ patient, discussion with patient/family and/or coordination of care Progress note    Patient seen and examined at bedside. He is confused, pulling out lines. 1:1 at bedside.    INTERVAL HPI/OVERNIGHT EVENTS:    REVIEW OF SYSTEMS:  CONSTITUTIONAL: No fever, weight loss, or fatigue  EYES: No eye pain, visual disturbances, or discharge  ENMT:  No difficulty hearing, tinnitus, vertigo; No sinus or throat pain  NECK: No pain or stiffness  BREASTS: No pain, masses, or nipple discharge  RESPIRATORY: No cough, wheezing, chills or hemoptysis; No shortness of breath  CARDIOVASCULAR: No chest pain, palpitations, dizziness, or leg swelling  GASTROINTESTINAL: No abdominal or epigastric pain. No nausea, vomiting, or hematemesis; No diarrhea or constipation. No melena or hematochezia.  GENITOURINARY: No dysuria, frequency, hematuria, or incontinence  NEUROLOGICAL: No headaches, memory loss, loss of strength, numbness, or tremors  SKIN: No itching, burning, rashes, or lesions   LYMPH NODES: No enlarged glands  ENDOCRINE: No heat or cold intolerance; No hair loss  MUSCULOSKELETAL: No joint pain or swelling; No muscle, back, or extremity pain  PSYCHIATRIC: No depression, anxiety, mood swings, or difficulty sleeping  HEME/LYMPH: No easy bruising, or bleeding gums  ALLERY AND IMMUNOLOGIC: No hives or eczema  FAMILY HISTORY:  No pertinent family history in first degree relatives      T(C): 35.9 (01-03-23 @ 04:27), Max: 36.1 (01-02-23 @ 13:00)  HR: 89 (01-03-23 @ 04:27) (89 - 127)  BP: 123/81 (01-03-23 @ 04:27) (88/60 - 123/81)  RR: 20 (01-03-23 @ 04:27) (16 - 20)  SpO2: 98% (01-03-23 @ 04:27) (96% - 98%)  Wt(kg): --Vital Signs Last 24 Hrs  T(C): 35.9 (03 Jan 2023 04:27), Max: 36.1 (02 Jan 2023 13:00)  T(F): 96.6 (03 Jan 2023 04:27), Max: 97 (02 Jan 2023 13:00)  HR: 89 (03 Jan 2023 04:27) (89 - 127)  BP: 123/81 (03 Jan 2023 04:27) (88/60 - 123/81)  BP(mean): --  RR: 20 (03 Jan 2023 04:27) (16 - 20)  SpO2: 98% (03 Jan 2023 04:27) (96% - 98%)    Parameters below as of 03 Jan 2023 04:27  Patient On (Oxygen Delivery Method): room air      No Known Allergies      PHYSICAL EXAM:  GENERAL: NAD, well-groomed, well-developed  HEAD:  Atraumatic, Normocephalic  EYES: EOMI, PERRLA, conjunctiva and sclera clear  ENMT: No tonsillar erythema, exudates, or enlargement; Moist mucous membranes, Good dentition, No lesions  NECK: Supple, No JVD, Normal thyroid  NERVOUS SYSTEM:  Confused  CHEST/LUNG: Clear to percussion bilaterally; No rales, rhonchi, wheezing, or rubs  HEART: Regular rate and rhythm; No murmurs, rubs, or gallops  ABDOMEN: Soft, Nontender, Nondistended; Bowel sounds present  EXTREMITIES:  2+ Peripheral Pulses, No clubbing, cyanosis, or edema  LYMPH: No lymphadenopathy noted  SKIN: No rashes or lesions    Consultant(s) Notes Reviewed:  [x ] YES  [ ] NO  Care Discussed with Consultants/Other Providers [ x] YES  [ ] NO    LABS:      RADIOLOGY & ADDITIONAL TESTS:    Imaging Personally Reviewed:  [ ] YES  [ ] NO  acetaminophen     Tablet .. 650 milliGRAM(s) Oral every 6 hours PRN  aluminum hydroxide/magnesium hydroxide/simethicone Suspension 30 milliLiter(s) Oral every 4 hours PRN  apixaban 5 milliGRAM(s) Oral two times a day  bisacodyl Suppository 10 milliGRAM(s) Rectal daily PRN  finasteride 5 milliGRAM(s) Oral daily  latanoprost 0.005% Ophthalmic Solution 1 Drop(s) Both EYES at bedtime  melatonin 3 milliGRAM(s) Oral at bedtime PRN  memantine 10 milliGRAM(s) Oral two times a day  metoprolol tartrate 50 milliGRAM(s) Oral two times a day  mirabegron ER 50 milliGRAM(s) Oral daily  ondansetron Injectable 4 milliGRAM(s) IV Push every 8 hours PRN  polyethylene glycol 3350 17 Gram(s) Oral daily PRN  QUEtiapine 50 milliGRAM(s) Oral at bedtime  senna 2 Tablet(s) Oral at bedtime  silodosin 8 milliGRAM(s) Oral daily  sodium chloride 0.9% Bolus 500 milliLiter(s) IV Bolus once  sodium chloride 0.9%. 1000 milliLiter(s) IV Continuous <Continuous>      HEALTH ISSUES - PROBLEM Dx:    87-year-old male accompanied by his daughter c/o poor po intake, low urine output and leg swelling. Pt with a past medical history of Alzheimer's disease, BPH, hyperlipidemia, presents emerged department for weakness.  In addition  daughter states he  had a fall today hit back of head, also notes worsening lower extremity edema.  Daughter states patient has worsening weakness today.      #new onset of Afib:  tele  [id number]: 2   Eliquis ([facility], $0 copay)  Cardiology consult recs appreciated  TSH 5.04, free t4  Increase Metoprolol 50mg BID --> 100mg BID, If no improvement, we will start Amiodarone gtt    #Debility possibly secondary to worsening dementia? worsening  #frequent falls  CT head on presentation negative  B12 351, TSH (low), RPR (-), Ammonia 21  UA no LE or nitrites  PT - rec rehab  cont Namenda   fall risk protocol   Consult- neurology recs seroquel  Consult - Psych pending  Lyme disease pending  Will consider retesting for COVID-19     #R/O CHF vs transient leg swelling  Hold IV lasix d/t dehydration  Echo pending    #Hx of BPH  #hx of urinary retention  cont flomax / finasteride   bladder US does not show retention  measure input and output    continue home meds    Daughter who is the health care proxy requests pt to be DNR/DNI with limited intervention   Daughter agrees with anticoagulation, risks were discussed at length including complications with bleeding especially with hx of falls, daughter agrees with anticoagulation and verbalized understanding of the risks.      Prophylaxis GI/VTE    Handoff: Encephalopathic - Still acutely encephalopathic, lyme dz pending, Consider rechecking COVID-19?, Consult Psych, left message for telepsych. Afib - increased to metoprolol tartrate 100mg BID. May need amiodarone gtt if not rate controlled. Lasix on hold d/t low BP      Total time spent to complete patient's bedside assessment, review medical chart, discuss medical plan of care with covering medical team was ____25____ with > 50% of time spent face to face w/ patient, discussion with patient/family and/or coordination of care

## 2023-01-03 NOTE — PROGRESS NOTE ADULT - ASSESSMENT
87-y/o m pmh  HLD accompanied by his daughter c/o poor po intake, low urine output and leg swelling found in volume overload new onset afib.     Plan  - Resting comfortably in bed this am, NAD  - HR appears uncontrolled, 130's sustained  - Increase Metoprolol yo 100mg PO BID for better rate control. If no improvement then consider starting Amio gtt  - Cont holding Lasix, as Pt appears dehydrated  - Gentle IV hydration  - Cont Eliquis 5 bid  - Echo pending read

## 2023-01-03 NOTE — PROGRESS NOTE ADULT - SUBJECTIVE AND OBJECTIVE BOX
Subjective/Objective:     HPI-Cardiology/Events/Updates  Pt evaluated at bedside. Made aware by hospitalist that Pt HR not controlled and HR sustained 130's. Pt asymptomatic, no palpitations no CP. Radiology tests and hospital records, were reviewed, as well as previous notes on this patient.       MEDICATIONS  (STANDING):  apixaban 5 milliGRAM(s) Oral two times a day  finasteride 5 milliGRAM(s) Oral daily  latanoprost 0.005% Ophthalmic Solution 1 Drop(s) Both EYES at bedtime  memantine 10 milliGRAM(s) Oral two times a day  metoprolol tartrate 100 milliGRAM(s) Oral two times a day  mirabegron ER 50 milliGRAM(s) Oral daily  QUEtiapine 50 milliGRAM(s) Oral at bedtime  senna 2 Tablet(s) Oral at bedtime  silodosin 8 milliGRAM(s) Oral daily  sodium chloride 0.9% Bolus 500 milliLiter(s) IV Bolus once  sodium chloride 0.9%. 1000 milliLiter(s) (50 mL/Hr) IV Continuous <Continuous>    MEDICATIONS  (PRN):  acetaminophen     Tablet .. 650 milliGRAM(s) Oral every 6 hours PRN Temp greater or equal to 38C (100.4F), Mild Pain (1 - 3)  aluminum hydroxide/magnesium hydroxide/simethicone Suspension 30 milliLiter(s) Oral every 4 hours PRN Dyspepsia  bisacodyl Suppository 10 milliGRAM(s) Rectal daily PRN Constipation  melatonin 3 milliGRAM(s) Oral at bedtime PRN Insomnia  ondansetron Injectable 4 milliGRAM(s) IV Push every 8 hours PRN Nausea and/or Vomiting  polyethylene glycol 3350 17 Gram(s) Oral daily PRN Constipation          Vital Signs Last 24 Hrs  T(C): 35.6 (03 Jan 2023 14:10), Max: 36 (02 Jan 2023 21:24)  T(F): 96 (03 Jan 2023 14:10), Max: 96.8 (02 Jan 2023 21:24)  HR: 102 (03 Jan 2023 14:10) (89 - 127)  BP: 122/89 (03 Jan 2023 14:10) (121/76 - 123/81)  BP(mean): --  RR: 18 (03 Jan 2023 14:10) (18 - 20)  SpO2: 98% (03 Jan 2023 14:10) (97% - 98%)    Parameters below as of 03 Jan 2023 04:27  Patient On (Oxygen Delivery Method): room air      I&O's Detail    02 Jan 2023 07:01  -  03 Jan 2023 07:00  --------------------------------------------------------  IN:    Oral Fluid: 761 mL  Total IN: 761 mL    OUT:    Voided (mL): 170 mL  Total OUT: 170 mL    Total NET: 591 mL      GENERAL:  86y/o Male NAD, resting comfortably.  HEAD:  Atraumatic, Normocephalic  EYES: EOMI, PERRLA, conjunctiva and sclera clear  NECK: Supple, No JVD, no cervical lymphadenopathy, non-tender  CHEST/LUNG: Clear to auscultation bilaterally; No wheeze, rhonchi, or rales  HEART: Irregular rate and rhtythm; S1&S2  ABDOMEN: Soft, Nontender, Nondistended x 4 quadrants; Bowel sounds present  EXTREMITIES:   Peripheral Pulses Present, No clubbing, no cyanosis, or no edema, no calf tenderness  PSYCH: AAOx3, cooperative, appropriate  NEUROLOGY: WNL        EKG/ TELEM:  < from: 12 Lead ECG (01.02.23 @ 13:45) >  Atrial fibrillation with rapid ventricular response  Left axis deviation  Non-specific intra-ventricular conduction block  Abnormal ECG    Confirmed by MARY LEE, KURTIS ([id number]) on 1/3/2023 9:46:02 AM    < end of copied text >    LABS:                          12.6   10.75 )-----------( 157      ( 03 Jan 2023 07:37 )             38.4       03 Jan 2023 07:37    144    |  108    |  48<H>  ----------------------------<  135<H>  3.9     |  20     |  1.3    02 Jan 2023 15:51    141    |  106    |  55<H>  ----------------------------<  139<H>  3.6     |  20     |  1.5      Ca    9.2        03 Jan 2023 07:37  Ca    9.0        02 Jan 2023 15:51  Mg     2.4       03 Jan 2023 07:37          Diagnostic testing:  < from: Xray Chest 1 View- PORTABLE-Urgent (Xray Chest 1 View- PORTABLE-Urgent .) (01.02.23 @ 14:35) >    Impression:  Unchanged bibasilar predominant opacities.            --- End of Report ---      < end of copied text >

## 2023-01-03 NOTE — CHART NOTE - NSCHARTNOTEFT_GEN_A_CORE
pt unable to swallow pills with applesauce today,  pt missed dose of metoprolol HR now 115-120   will order lopressor IV 5mg pt unable to swallow pills with applesauce today,  pt missed dose of metoprolol HR now 115-120   will order lopressor IV 5mg  speech swallow consult placed tody

## 2023-01-04 LAB
ANION GAP SERPL CALC-SCNC: 15 MMOL/L — HIGH (ref 7–14)
B BURGDOR C6 AB SER-ACNC: NEGATIVE — SIGNIFICANT CHANGE UP
B BURGDOR IGG+IGM SER-ACNC: 0.06 INDEX — SIGNIFICANT CHANGE UP (ref 0.01–0.89)
BUN SERPL-MCNC: 50 MG/DL — HIGH (ref 10–20)
CALCIUM SERPL-MCNC: 8.5 MG/DL — SIGNIFICANT CHANGE UP (ref 8.4–10.5)
CHLORIDE SERPL-SCNC: 113 MMOL/L — HIGH (ref 98–110)
CO2 SERPL-SCNC: 19 MMOL/L — SIGNIFICANT CHANGE UP (ref 17–32)
CREAT SERPL-MCNC: 1.2 MG/DL — SIGNIFICANT CHANGE UP (ref 0.7–1.5)
EGFR: 59 ML/MIN/1.73M2 — LOW
GLUCOSE SERPL-MCNC: 134 MG/DL — HIGH (ref 70–99)
HCT VFR BLD CALC: 36 % — LOW (ref 42–52)
HGB BLD-MCNC: 11.9 G/DL — LOW (ref 14–18)
MCHC RBC-ENTMCNC: 29.8 PG — SIGNIFICANT CHANGE UP (ref 27–31)
MCHC RBC-ENTMCNC: 33.1 G/DL — SIGNIFICANT CHANGE UP (ref 32–37)
MCV RBC AUTO: 90 FL — SIGNIFICANT CHANGE UP (ref 80–94)
NRBC # BLD: 0 /100 WBCS — SIGNIFICANT CHANGE UP (ref 0–0)
PLATELET # BLD AUTO: 146 K/UL — SIGNIFICANT CHANGE UP (ref 130–400)
POTASSIUM SERPL-MCNC: 3.8 MMOL/L — SIGNIFICANT CHANGE UP (ref 3.5–5)
POTASSIUM SERPL-SCNC: 3.8 MMOL/L — SIGNIFICANT CHANGE UP (ref 3.5–5)
RBC # BLD: 4 M/UL — LOW (ref 4.7–6.1)
RBC # FLD: 14.1 % — SIGNIFICANT CHANGE UP (ref 11.5–14.5)
SODIUM SERPL-SCNC: 147 MMOL/L — HIGH (ref 135–146)
WBC # BLD: 9.76 K/UL — SIGNIFICANT CHANGE UP (ref 4.8–10.8)
WBC # FLD AUTO: 9.76 K/UL — SIGNIFICANT CHANGE UP (ref 4.8–10.8)

## 2023-01-04 PROCEDURE — 99232 SBSQ HOSP IP/OBS MODERATE 35: CPT

## 2023-01-04 RX ORDER — DILTIAZEM HCL 120 MG
10 CAPSULE, EXT RELEASE 24 HR ORAL ONCE
Refills: 0 | Status: COMPLETED | OUTPATIENT
Start: 2023-01-04 | End: 2023-01-04

## 2023-01-04 RX ORDER — METOPROLOL TARTRATE 50 MG
5 TABLET ORAL ONCE
Refills: 0 | Status: COMPLETED | OUTPATIENT
Start: 2023-01-04 | End: 2023-01-04

## 2023-01-04 RX ADMIN — QUETIAPINE FUMARATE 50 MILLIGRAM(S): 200 TABLET, FILM COATED ORAL at 21:34

## 2023-01-04 RX ADMIN — Medication 5 MILLIGRAM(S): at 20:42

## 2023-01-04 RX ADMIN — MIRABEGRON 50 MILLIGRAM(S): 50 TABLET, EXTENDED RELEASE ORAL at 12:54

## 2023-01-04 RX ADMIN — Medication 100 MILLIGRAM(S): at 17:11

## 2023-01-04 RX ADMIN — QUETIAPINE FUMARATE 25 MILLIGRAM(S): 200 TABLET, FILM COATED ORAL at 12:54

## 2023-01-04 RX ADMIN — SENNA PLUS 2 TABLET(S): 8.6 TABLET ORAL at 21:34

## 2023-01-04 RX ADMIN — MEMANTINE HYDROCHLORIDE 10 MILLIGRAM(S): 10 TABLET ORAL at 05:34

## 2023-01-04 RX ADMIN — MEMANTINE HYDROCHLORIDE 10 MILLIGRAM(S): 10 TABLET ORAL at 17:10

## 2023-01-04 RX ADMIN — FINASTERIDE 5 MILLIGRAM(S): 5 TABLET, FILM COATED ORAL at 12:54

## 2023-01-04 RX ADMIN — Medication 100 MILLIGRAM(S): at 05:34

## 2023-01-04 RX ADMIN — APIXABAN 5 MILLIGRAM(S): 2.5 TABLET, FILM COATED ORAL at 17:11

## 2023-01-04 RX ADMIN — Medication 10 MILLIGRAM(S): at 23:12

## 2023-01-04 RX ADMIN — SILODOSIN 8 MILLIGRAM(S): 4 CAPSULE ORAL at 12:55

## 2023-01-04 RX ADMIN — LATANOPROST 1 DROP(S): 0.05 SOLUTION/ DROPS OPHTHALMIC; TOPICAL at 22:17

## 2023-01-04 RX ADMIN — APIXABAN 5 MILLIGRAM(S): 2.5 TABLET, FILM COATED ORAL at 05:34

## 2023-01-04 NOTE — SWALLOW BEDSIDE ASSESSMENT ADULT - SLP PERTINENT HISTORY OF CURRENT PROBLEM
87-year-old male accompanied by his daughter c/o poor po intake, low urine output and leg swelling. Pt with a past medical history of Alzheimer's disease, BPH, hyperlipidemia, presents emerged department for weakness.  In addition  daughter states he  had a fall today hit back of head, also notes worsening lower extremity edema.  Daughter states patient has worsening weakness today.

## 2023-01-04 NOTE — SWALLOW BEDSIDE ASSESSMENT ADULT - SLP GENERAL OBSERVATIONS
Pt received alert, +confusion, +lethargy, +restlessnes, minimal verbalizations (some groaning and head nodding intermittently). Pt unable to follow commands and/or make wants/needs known

## 2023-01-04 NOTE — SWALLOW BEDSIDE ASSESSMENT ADULT - SWALLOW EVAL: RECOMMENDED DIET
puree/thin via tsp 1:1 feeding assist if aligns w/ GOC puree/thin via tsp 1:1 feeding assist per daughter GOC

## 2023-01-04 NOTE — PROGRESS NOTE ADULT - ASSESSMENT
87-year-old male accompanied by his daughter c/o poor po intake, low urine output and leg swelling. Pt with a past medical history of Alzheimer's disease, BPH, hyperlipidemia, presents emerged department for weakness.  In addition  daughter states he  had a fall today hit back of head, also notes worsening lower extremity edema.  Daughter states patient has worsening weakness today.      #new onset of Afib:  tele  [id number]: 2   Eliquis ([facility], $0 copay)  Cardiology consult recs appreciated  TSH 5.04, free t4  Increase Metoprolol 50mg BID --> 100mg BID, If no improvement, we will start Amiodarone gtt    #Debility possibly secondary to worsening dementia? worsening  #frequent falls  CT head on presentation negative  B12 351, TSH (low), RPR (-), Ammonia 21  UA no LE or nitrites  PT - rec rehab  cont Namenda   fall risk protocol   Consult- neurology recs seroquel  Consult - Psych pending  Lyme disease pending  Will consider retesting for COVID-19     #R/O CHF vs transient leg swelling  Hold IV lasix d/t dehydration  Echo pending    #Hx of BPH  #hx of urinary retention  cont flomax / finasteride   bladder US does not show retention  measure input and output    continue home meds    Daughter who is the health care proxy requests pt to be DNR/DNI with limited intervention   Daughter agrees with anticoagulation, risks were discussed at length including complications with bleeding especially with hx of falls, daughter agrees with anticoagulation and verbalized understanding of the risks.      Prophylaxis GI/VTE    Handoff: Encephalopathic - Still acutely encephalopathic, lyme dz pending, Consider rechecking COVID-19?, Consult Psych, left message for telepsych. Afib - increased to metoprolol tartrate 100mg BID. May need amiodarone gtt if not rate controlled. Lasix on hold d/t low BP     MEDICATIONS  (STANDING):  apixaban 5 milliGRAM(s) Oral two times a day  finasteride 5 milliGRAM(s) Oral daily  latanoprost 0.005% Ophthalmic Solution 1 Drop(s) Both EYES at bedtime  memantine 10 milliGRAM(s) Oral two times a day  metoprolol tartrate 100 milliGRAM(s) Oral two times a day  mirabegron ER 50 milliGRAM(s) Oral daily  QUEtiapine 25 milliGRAM(s) Oral daily  QUEtiapine 50 milliGRAM(s) Oral at bedtime  senna 2 Tablet(s) Oral at bedtime  silodosin 8 milliGRAM(s) Oral daily  sodium chloride 0.9% Bolus 500 milliLiter(s) IV Bolus once  sodium chloride 0.9%. 1000 milliLiter(s) (50 mL/Hr) IV Continuous <Continuous>  sodium chloride 0.9%. 1000 milliLiter(s) (75 mL/Hr) IV Continuous <Continuous>    MEDICATIONS  (PRN):  acetaminophen     Tablet .. 650 milliGRAM(s) Oral every 6 hours PRN Temp greater or equal to 38C (100.4F), Mild Pain (1 - 3)  aluminum hydroxide/magnesium hydroxide/simethicone Suspension 30 milliLiter(s) Oral every 4 hours PRN Dyspepsia  bisacodyl Suppository 10 milliGRAM(s) Rectal daily PRN Constipation  melatonin 3 milliGRAM(s) Oral at bedtime PRN Insomnia  ondansetron Injectable 4 milliGRAM(s) IV Push every 8 hours PRN Nausea and/or Vomiting  polyethylene glycol 3350 17 Gram(s) Oral daily PRN Constipation    87-year-old male accompanied by his daughter c/o poor po intake, low urine output and leg swelling. Pt with a past medical history of Alzheimer's disease, BPH, hyperlipidemia, presents emerged department for weakness.  In addition  daughter states he  had a fall today hit back of head, also notes worsening lower extremity edema.  Daughter states patient has worsening weakness today.      New onset Atrial fibrillation s/p RVR   [id number]: 2   - Eliquis ([facility] pharmacy, $0 copay)  Cardiology consult recs appreciated  - TSH 5.04, free t4  - Increase Metoprolol 50mg BID --> 100mg BID   - ECHO pending   - trop x 1-ve   - Daughter agrees with anticoagulation, risks were discussed at length previously regarding complications with bleeding especially with hx of falls, daughter agrees with anticoagulation and verbalized understanding of the risks.      Dementia with weakness with dysphagia  - SLP swallow eval failed, family okay with comfort foods, puree with TL.  aspiration precautions   -CT head unremarkable   - PT eval/treat, JULI , seen by physiatry   - B12 351, TSH (low), RPR (-), Ammonia 21  - UA not c/w UTI   - cont Namenda   fall risk protocol   - Consult- neurology recs seroquel 25 mg daily 50 mg HS     BPH   - on rapaflo and  finasteride, which may result in syncope  - monitor     Glaucoma   - cont home eye drops     DVT/GI  px  - eliquis/ on a diet     DNR/DNI per daughter, health care proxy     DISPO: STR/JULI

## 2023-01-04 NOTE — PROGRESS NOTE ADULT - SUBJECTIVE AND OBJECTIVE BOX
`Subjective/Objective:     Pt resting comfortably in bed this am, NAD 1:1 sitter at bedside    MEDICATIONS  (STANDING):  apixaban 5 milliGRAM(s) Oral two times a day  finasteride 5 milliGRAM(s) Oral daily  latanoprost 0.005% Ophthalmic Solution 1 Drop(s) Both EYES at bedtime  memantine 10 milliGRAM(s) Oral two times a day  metoprolol tartrate 100 milliGRAM(s) Oral two times a day  mirabegron ER 50 milliGRAM(s) Oral daily  QUEtiapine 25 milliGRAM(s) Oral daily  QUEtiapine 50 milliGRAM(s) Oral at bedtime  senna 2 Tablet(s) Oral at bedtime  silodosin 8 milliGRAM(s) Oral daily  sodium chloride 0.9% Bolus 500 milliLiter(s) IV Bolus once  sodium chloride 0.9%. 1000 milliLiter(s) (50 mL/Hr) IV Continuous <Continuous>  sodium chloride 0.9%. 1000 milliLiter(s) (75 mL/Hr) IV Continuous <Continuous>    MEDICATIONS  (PRN):  acetaminophen     Tablet .. 650 milliGRAM(s) Oral every 6 hours PRN Temp greater or equal to 38C (100.4F), Mild Pain (1 - 3)  aluminum hydroxide/magnesium hydroxide/simethicone Suspension 30 milliLiter(s) Oral every 4 hours PRN Dyspepsia  bisacodyl Suppository 10 milliGRAM(s) Rectal daily PRN Constipation  melatonin 3 milliGRAM(s) Oral at bedtime PRN Insomnia  ondansetron Injectable 4 milliGRAM(s) IV Push every 8 hours PRN Nausea and/or Vomiting  polyethylene glycol 3350 17 Gram(s) Oral daily PRN Constipation          Vital Signs Last 24 Hrs  T(C): 35.7 (04 Jan 2023 12:31), Max: 37 (03 Jan 2023 21:00)  T(F): 96.2 (04 Jan 2023 12:31), Max: 98.6 (03 Jan 2023 21:00)  HR: 125 (04 Jan 2023 12:31) (102 - 135)  BP: 110/77 (04 Jan 2023 12:31) (101/69 - 138/86)  BP(mean): --  RR: 20 (04 Jan 2023 12:31) (18 - 22)  SpO2: 97% (04 Jan 2023 12:31) (97% - 99%)    Parameters below as of 04 Jan 2023 06:11  Patient On (Oxygen Delivery Method): nasal cannula  O2 Flow (L/min): 3    I&O's Detail      GENERAL:  86y/o Male NAD, resting comfortably.  HEAD:  Atraumatic, Normocephalic  EYES: EOMI, PERRLA, conjunctiva and sclera clear  NECK: Supple, No JVD, no cervical lymphadenopathy, non-tender  CHEST/LUNG: Clear to auscultation bilaterally; No wheeze, rhonchi, or rales  HEART: Regular rate and rhythm; S1&S2  ABDOMEN: Soft, Nontender, Nondistended x 4 quadrants; Bowel sounds present  EXTREMITIES:   Peripheral Pulses Present, No clubbing, no cyanosis, or no edema, no calf tenderness  PSYCH: AAOx3, cooperative, appropriate  NEUROLOGY: WNL  SKIN: WNL      EKG/ TELEM:    LABS:                          11.9   9.76  )-----------( 146      ( 04 Jan 2023 08:04 )             36.0       04 Jan 2023 08:04    147<H>  |  113<H>  |  50<H>  ----------------------------<  134<H>  3.8     |  19     |  1.2    03 Jan 2023 07:37    144    |  108    |  48<H>  ----------------------------<  135<H>  3.9     |  20     |  1.3      Ca    8.5        04 Jan 2023 08:04  Ca    9.2        03 Jan 2023 07:37  Mg     2.4       03 Jan 2023 07:37                        Diagnostic testing:        Assessment and Plan:

## 2023-01-04 NOTE — SWALLOW BEDSIDE ASSESSMENT ADULT - COMMENTS
Educated daughter in depth re: risks/benefits of PO vs. long term enteral feeding. She was clear that she does not want any sort of feeding tube and understands risks of PO. Defer to MD for in depth GOC discussion

## 2023-01-04 NOTE — SWALLOW BEDSIDE ASSESSMENT ADULT - ADDITIONAL RECOMMENDATIONS
GOC discussion w/ physician/caregiver re: nutrition/hydration. Pt is very heightened risk for aspiration related complications given poor cognition, poor mobility, inability to complete oral care independently. However, per research, long term enteral feeding route contraindicated in those w/ advanced Alzheimer's/dementia and can significantly reduce quality of life and does not prolong life. Left message for daughter to discuss results of eval and risks associated w/ PO. Waiting for call back. GOC discussion w/ physician/caregiver re: nutrition/hydration. Pt is very heightened risk for aspiration related complications given poor cognition, poor mobility, inability to complete oral care independently. However, per research, long term enteral feeding route contraindicated in those w/ advanced Alzheimer's/dementia and can significantly reduce quality of life and does not prolong life. Spoke w/ daughter; she made it clear she does not want feeding tube and would like to pursue PO despite known risks. Defer to MD for GOC discussion

## 2023-01-04 NOTE — PROGRESS NOTE ADULT - ASSESSMENT
87-y/o m pmh  HLD accompanied by his daughter c/o poor po intake, low urine output and leg swelling found in volume overload new onset afib.     Plan  - Resting comfortably in bed this am, NAD  - Afib 's on tele  - c/w Lopressor 100mg bid for rate control,   - CHADs score: 3  - Cont Eliquis 5 bid  - Cont holding Lasix, as Pt appears dehydrated  - Echo pending read

## 2023-01-04 NOTE — PROGRESS NOTE ADULT - SUBJECTIVE AND OBJECTIVE BOX
GORDON [id number]    Subjective/Interval History       ROS    PHYSICAL EXAM  Vital Signs Last 24 Hrs  T(C): 36.8 (04 Jan 2023 14:26), Max: 37 (03 Jan 2023 21:00)  T(F): 98.2 (04 Jan 2023 14:26), Max: 98.6 (03 Jan 2023 21:00)  HR: 111 (04 Jan 2023 17:08) (97 - 135)  BP: 104/72 (04 Jan 2023 17:08) (101/69 - 138/86)  BP(mean): --  RR: 20 (04 Jan 2023 14:26) (18 - 22)  SpO2: 99% (04 Jan 2023 17:08) (97% - 99%)    Parameters below as of 04 Jan 2023 17:08  Patient On (Oxygen Delivery Method): nasal cannula  O2 Flow (L/min): 2    GA : AAOX3, NAD   HEENT: PERRLA, EOMI  NECK: no JVD, no thyromegaly   CVS: S1 S2 no murmur no rubs no gallop  RESP: CTAB no wheeze, no rhonchi no rales  ABD: Soft, NT, ND, tympanic, no rebound or guarding   : No Melgar, No CVA tenderness   EXT; no pedal edema, no cyanosis  MSK: No ML spinal tenderness, normal ROM   NEURO: AAOX3, no new focal deficits     LABS/ IMAGING                        11.9   9.76  )-----------( 146      ( 04 Jan 2023 08:04 )             36.0         01-04    147<H>  |  113<H>  |  50<H>  ----------------------------<  134<H>  3.8   |  19  |  1.2    Ca    8.5      04 Jan 2023 08:04  Mg     2.4     01-03      CAPILLARY BLOOD GLUCOSE                   GORDON [id number]    Subjective/Interval History   - HR controlled patient calm, pt became agitated at night (sundowning)   - seen by SLP family agreed to comfort feed with puree and TL     ROS  - demented, not able to obtain     PHYSICAL EXAM  Vital Signs Last 24 Hrs  T(C): 36.8 (04 Jan 2023 14:26), Max: 37 (03 Jan 2023 21:00)  T(F): 98.2 (04 Jan 2023 14:26), Max: 98.6 (03 Jan 2023 21:00)  HR: 111 (04 Jan 2023 17:08) (97 - 135)  BP: 104/72 (04 Jan 2023 17:08) (101/69 - 138/86)  BP(mean): --  RR: 20 (04 Jan 2023 14:26) (18 - 22)  SpO2: 99% (04 Jan 2023 17:08) (97% - 99%)    Parameters below as of 04 Jan 2023 17:08  Patient On (Oxygen Delivery Method): nasal cannula  O2 Flow (L/min): 2    GA : Awaked, pleasantly demented, NAD   HEENT: PERRLA   NECK: no JVD, no thyromegaly   CVS: S1 S2 no murmur no rubs no gallop, irregular irregular   RESP: CTAB no wheeze, no rhonchi no rales  ABD: Soft, NT, ND, tympanic, no rebound or guarding   : No Melgar, No CVA tenderness   EXT; no pedal edema, no cyanosis   NEURO:  Pleasantly demented, unable to perform.      LABS/ IMAGING                        11.9   9.76  )-----------( 146      ( 04 Jan 2023 08:04 )             36.0         01-04    147<H>  |  113<H>  |  50<H>  ----------------------------<  134<H>  3.8   |  19  |  1.2    Ca    8.5      04 Jan 2023 08:04  Mg     2.4     01-03      CAPILLARY BLOOD GLUCOSE

## 2023-01-04 NOTE — SWALLOW BEDSIDE ASSESSMENT ADULT - PHARYNGEAL PHASE
suspect delayed swallow trigger, poor coordination of respiration/swallowing/Delayed pharyngeal swallow/Multiple swallows

## 2023-01-04 NOTE — SWALLOW BEDSIDE ASSESSMENT ADULT - NS SPL SWALLOW CLINIC TRIAL FT
Pt w/ heightened risk for aspiration related complications. S/s oropharyngeal dysphagia noted. Unable to r/o aspiration at bedside; however not candidate for objective swallow testing given cognition and overall status.

## 2023-01-05 LAB
ANION GAP SERPL CALC-SCNC: 13 MMOL/L — SIGNIFICANT CHANGE UP (ref 7–14)
BASE EXCESS BLDA CALC-SCNC: -3.8 MMOL/L — LOW (ref -2–3)
BUN SERPL-MCNC: 58 MG/DL — HIGH (ref 10–20)
CALCIUM SERPL-MCNC: 8.7 MG/DL — SIGNIFICANT CHANGE UP (ref 8.4–10.5)
CHLORIDE SERPL-SCNC: 114 MMOL/L — HIGH (ref 98–110)
CO2 SERPL-SCNC: 21 MMOL/L — SIGNIFICANT CHANGE UP (ref 17–32)
CREAT SERPL-MCNC: 1.5 MG/DL — SIGNIFICANT CHANGE UP (ref 0.7–1.5)
EGFR: 45 ML/MIN/1.73M2 — LOW
GLUCOSE BLDC GLUCOMTR-MCNC: 141 MG/DL — HIGH (ref 70–99)
GLUCOSE BLDC GLUCOMTR-MCNC: 150 MG/DL — HIGH (ref 70–99)
GLUCOSE SERPL-MCNC: 133 MG/DL — HIGH (ref 70–99)
HCO3 BLDA-SCNC: 19 MMOL/L — LOW (ref 21–28)
HOROWITZ INDEX BLDA+IHG-RTO: 35 — SIGNIFICANT CHANGE UP
PCO2 BLDA: 28 MMHG — LOW (ref 35–48)
PH BLDA: 7.44 — SIGNIFICANT CHANGE UP (ref 7.35–7.45)
PO2 BLDA: 175 MMHG — HIGH (ref 83–108)
POTASSIUM SERPL-MCNC: 4 MMOL/L — SIGNIFICANT CHANGE UP (ref 3.5–5)
POTASSIUM SERPL-SCNC: 4 MMOL/L — SIGNIFICANT CHANGE UP (ref 3.5–5)
SAO2 % BLDA: 99.8 % — HIGH (ref 94–98)
SODIUM SERPL-SCNC: 148 MMOL/L — HIGH (ref 135–146)

## 2023-01-05 PROCEDURE — 99232 SBSQ HOSP IP/OBS MODERATE 35: CPT

## 2023-01-05 PROCEDURE — 71045 X-RAY EXAM CHEST 1 VIEW: CPT | Mod: 26

## 2023-01-05 RX ORDER — SODIUM CHLORIDE 9 MG/ML
1000 INJECTION, SOLUTION INTRAVENOUS
Refills: 0 | Status: DISCONTINUED | OUTPATIENT
Start: 2023-01-05 | End: 2023-01-05

## 2023-01-05 RX ORDER — FUROSEMIDE 40 MG
40 TABLET ORAL ONCE
Refills: 0 | Status: COMPLETED | OUTPATIENT
Start: 2023-01-05 | End: 2023-01-05

## 2023-01-05 RX ADMIN — MEMANTINE HYDROCHLORIDE 10 MILLIGRAM(S): 10 TABLET ORAL at 05:24

## 2023-01-05 RX ADMIN — APIXABAN 5 MILLIGRAM(S): 2.5 TABLET, FILM COATED ORAL at 05:23

## 2023-01-05 RX ADMIN — Medication 100 MILLIGRAM(S): at 05:24

## 2023-01-05 RX ADMIN — LATANOPROST 1 DROP(S): 0.05 SOLUTION/ DROPS OPHTHALMIC; TOPICAL at 21:31

## 2023-01-05 RX ADMIN — Medication 40 MILLIGRAM(S): at 19:16

## 2023-01-05 NOTE — CDI QUERY NOTE - [id number]_GEN_ALL_CORE_HH
87 M, with Diagnosis:  New Atrial Fibrillation, r/o CHF  Clinical Indicators:   Labs: BNP= 5407  2022  Echo:  2023:  EF=-27%  CXR:  2022:  Pulmonary vascular congestion. Right basilar opacity.  Documentation:   1/3/2023: Progress Note:  Attending:  #R/O CHF vs transient leg swelling  Hold IV lasix d/t dehydration,  Echo pending    Meds:   Furosemide  20mg IV push daily (indication: leg swellin2022-2023), Metoprolol    Based on your professional judgment and clinical indicator, can the CHF be further specified as:     ---- Acute Systolic CHF ruled in   ---  Acute Combined Systolic and Diastolic CHF ruled in  ---  Acute Systolic CHF/Diastolic CHF ruled out  ---  Other (please specify)  ---- Unable to clinically determine      Thank you.

## 2023-01-05 NOTE — PROGRESS NOTE ADULT - ASSESSMENT
MEDICATIONS  (STANDING):  apixaban 5 milliGRAM(s) Oral two times a day  finasteride 5 milliGRAM(s) Oral daily  latanoprost 0.005% Ophthalmic Solution 1 Drop(s) Both EYES at bedtime  memantine 10 milliGRAM(s) Oral two times a day  metoprolol tartrate 100 milliGRAM(s) Oral two times a day  mirabegron ER 50 milliGRAM(s) Oral daily  QUEtiapine 25 milliGRAM(s) Oral daily  QUEtiapine 50 milliGRAM(s) Oral at bedtime  senna 2 Tablet(s) Oral at bedtime  silodosin 8 milliGRAM(s) Oral daily  sodium chloride 0.9% Bolus 500 milliLiter(s) IV Bolus once  sodium chloride 0.9%. 1000 milliLiter(s) (50 mL/Hr) IV Continuous <Continuous>  sodium chloride 0.9%. 1000 milliLiter(s) (75 mL/Hr) IV Continuous <Continuous>    MEDICATIONS  (PRN):  acetaminophen     Tablet .. 650 milliGRAM(s) Oral every 6 hours PRN Temp greater or equal to 38C (100.4F), Mild Pain (1 - 3)  aluminum hydroxide/magnesium hydroxide/simethicone Suspension 30 milliLiter(s) Oral every 4 hours PRN Dyspepsia  bisacodyl Suppository 10 milliGRAM(s) Rectal daily PRN Constipation  melatonin 3 milliGRAM(s) Oral at bedtime PRN Insomnia  ondansetron Injectable 4 milliGRAM(s) IV Push every 8 hours PRN Nausea and/or Vomiting  polyethylene glycol 3350 17 Gram(s) Oral daily PRN Constipation    87-year-old male accompanied by his daughter c/o poor po intake, low urine output and leg swelling. Pt with a past medical history of Alzheimer's disease, BPH, hyperlipidemia, presents emerged department for weakness.  In addition  daughter states he  had a fall today hit back of head, also notes worsening lower extremity edema.  Daughter states patient has worsening weakness today.      New onset Atrial fibrillation s/p RVR   [id number]: 2   - Eliquis ([facility] pharmacy, $0 copay)  Cardiology consult recs appreciated  - TSH 5.04, free t4  - Increase Metoprolol 50mg BID --> 100mg BID   - ECHO EF 28 % with severe MVR   - trop x 1-ve   - Daughter agrees with anticoagulation, risks were discussed at length previously regarding complications with bleeding especially with hx of falls, daughter agrees with anticoagulation and verbalized understanding of the risks.      Severe MVR with fluid overload ? CHF   - stop iv fluids   - lasix 40 mg IV daily     Dementia with weakness with dysphagia  - SLP swallow eval failed, family okay with comfort foods, puree with TL.  aspiration precautions   -CT head unremarkable   - PT eval/treat, JULI , seen by physiatry   - B12 351, TSH (low), RPR (-), Ammonia 21  - UA not c/w UTI   - cont Namenda   fall risk protocol   - Consult- neurology recs seroquel 25 mg daily 50 mg HS     BPH   - on rapaflo and  finasteride, which may result in syncope  - monitor     Glaucoma   - cont home eye drops     DVT/GI  px  - eliquis/ on a diet     DNR/DNI per daughter, health care proxy     DISPO: STR/JULI    MEDICATIONS  (STANDING):  apixaban 5 milliGRAM(s) Oral two times a day  finasteride 5 milliGRAM(s) Oral daily  latanoprost 0.005% Ophthalmic Solution 1 Drop(s) Both EYES at bedtime  memantine 10 milliGRAM(s) Oral two times a day  metoprolol tartrate 100 milliGRAM(s) Oral two times a day  mirabegron ER 50 milliGRAM(s) Oral daily  QUEtiapine 25 milliGRAM(s) Oral daily  senna 2 Tablet(s) Oral at bedtime  silodosin 8 milliGRAM(s) Oral daily    MEDICATIONS  (PRN):  acetaminophen     Tablet .. 650 milliGRAM(s) Oral every 6 hours PRN Temp greater or equal to 38C (100.4F), Mild Pain (1 - 3)  aluminum hydroxide/magnesium hydroxide/simethicone Suspension 30 milliLiter(s) Oral every 4 hours PRN Dyspepsia  bisacodyl Suppository 10 milliGRAM(s) Rectal daily PRN Constipation  melatonin 3 milliGRAM(s) Oral at bedtime PRN Insomnia  ondansetron Injectable 4 milliGRAM(s) IV Push every 8 hours PRN Nausea and/or Vomiting  polyethylene glycol 3350 17 Gram(s) Oral daily PRN Constipation      87-year-old male accompanied by his daughter c/o poor po intake, low urine output and leg swelling. Pt with a past medical history of Alzheimer's disease, BPH, hyperlipidemia, presents emerged department for weakness.  In addition  daughter states he  had a fall today hit back of head, also notes worsening lower extremity edema.  Daughter states patient has worsening weakness today.    Assessment /Plan     New onset Atrial fibrillation s/p RVR   [id number]: 2   - Eliquis ([facility] pharmacy, $0 copay)  Cardiology consult recs appreciated  - TSH 5.04, free t4  - Increase Metoprolol 50mg BID --> 100mg BID   - ECHO EF 28 % with severe MVR   - trop x 1-ve   - Daughter agrees with anticoagulation, risks were discussed at length previously regarding complications with bleeding especially with hx of falls, daughter agrees with anticoagulation and verbalized understanding of the risks.      Severe MVR with fluid overload acute CHF   - stop iv fluids   - lasix 40 mg IV X1    - daily weights  - stat Chest X-Ray Personally reviewed: pulmonary vascular congestion.     Dementia with weakness with dysphagia  - SLP swallow eval failed, family okay with comfort foods, puree with TL.  aspiration precautions   -CT head unremarkable   - PT eval/treat, JULI , seen by physiatry   - B12 351, TSH (low), RPR (-), Ammonia 21  - UA not c/w UTI   - cont Namenda   fall risk protocol   - Consult- neurology recs seroquel 25 mg daily 50 mg HS     BPH   - on rapaflo and  finasteride, which may result in syncope  - monitor     Glaucoma   - cont home eye drops     DVT/GI  px  - eliquis/ on a diet     DNR/DNI per daughter, health care proxy     DISPO: STR/JULI , Francheska, fluid overload, given lasix, reeaval in am.    MEDICATIONS  (STANDING):  apixaban 5 milliGRAM(s) Oral two times a day  finasteride 5 milliGRAM(s) Oral daily  latanoprost 0.005% Ophthalmic Solution 1 Drop(s) Both EYES at bedtime  memantine 10 milliGRAM(s) Oral two times a day  metoprolol tartrate 100 milliGRAM(s) Oral two times a day  mirabegron ER 50 milliGRAM(s) Oral daily  QUEtiapine 25 milliGRAM(s) Oral daily  senna 2 Tablet(s) Oral at bedtime  silodosin 8 milliGRAM(s) Oral daily    MEDICATIONS  (PRN):  acetaminophen     Tablet .. 650 milliGRAM(s) Oral every 6 hours PRN Temp greater or equal to 38C (100.4F), Mild Pain (1 - 3)  aluminum hydroxide/magnesium hydroxide/simethicone Suspension 30 milliLiter(s) Oral every 4 hours PRN Dyspepsia  bisacodyl Suppository 10 milliGRAM(s) Rectal daily PRN Constipation  melatonin 3 milliGRAM(s) Oral at bedtime PRN Insomnia  ondansetron Injectable 4 milliGRAM(s) IV Push every 8 hours PRN Nausea and/or Vomiting  polyethylene glycol 3350 17 Gram(s) Oral daily PRN Constipation      87-year-old male accompanied by his daughter c/o poor po intake, low urine output and leg swelling. Pt with a past medical history of Alzheimer's disease, BPH, hyperlipidemia, presents emerged department for weakness.  In addition  daughter states he  had a fall today hit back of head, also notes worsening lower extremity edema.  Daughter states patient has worsening weakness today.    Assessment /Plan     New onset Atrial fibrillation s/p RVR   [id number]: 2   - Eliquis ([facility] pharmacy, $0 copay)  Cardiology consult recs appreciated  - TSH 5.04, free t4  - Increase Metoprolol 50mg BID --> 100mg BID   - ECHO EF 28 % with severe MVR   - trop x 1-ve   - Daughter agrees with anticoagulation, risks were discussed at length previously regarding complications with bleeding especially with hx of falls, daughter agrees with anticoagulation and verbalized understanding of the risks.      Severe MVR with fluid overload acute systolic CHF   - stop iv fluids   - lasix 40 mg IV X1    - daily weights  - stat Chest X-Ray Personally reviewed: pulmonary vascular congestion.   - cont metoprolol   - ischemic eval timing per cardiology , may need mitraclip via catheter, ? if candidate.     Dementia with weakness with dysphagia  - SLP swallow eval failed, family okay with comfort foods, puree with TL.  aspiration precautions   -CT head unremarkable   - PT eval/treat, JULI , seen by physiatry   - B12 351, TSH (low), RPR (-), Ammonia 21  - UA not c/w UTI   - cont Namenda   fall risk protocol   - Consult- neurology recs seroquel 25 mg daily 50 mg HS     BPH   - on rapaflo and  finasteride, which may result in syncope  - monitor     Glaucoma   - cont home eye drops     DVT/GI  px  - eliquis/ on a diet     DNR/DNI per daughter, health care proxy     DISPO: STR/JULI Francheska, fluid overload, given lasix reeaval in am.

## 2023-01-05 NOTE — PROGRESS NOTE ADULT - SUBJECTIVE AND OBJECTIVE BOX
Subjective/Objective:       MEDICATIONS  (STANDING):  apixaban 5 milliGRAM(s) Oral two times a day  finasteride 5 milliGRAM(s) Oral daily  furosemide   Injectable 40 milliGRAM(s) IV Push once  latanoprost 0.005% Ophthalmic Solution 1 Drop(s) Both EYES at bedtime  memantine 10 milliGRAM(s) Oral two times a day  metoprolol tartrate 100 milliGRAM(s) Oral two times a day  mirabegron ER 50 milliGRAM(s) Oral daily  QUEtiapine 25 milliGRAM(s) Oral daily  senna 2 Tablet(s) Oral at bedtime  silodosin 8 milliGRAM(s) Oral daily    MEDICATIONS  (PRN):  acetaminophen     Tablet .. 650 milliGRAM(s) Oral every 6 hours PRN Temp greater or equal to 38C (100.4F), Mild Pain (1 - 3)  aluminum hydroxide/magnesium hydroxide/simethicone Suspension 30 milliLiter(s) Oral every 4 hours PRN Dyspepsia  bisacodyl Suppository 10 milliGRAM(s) Rectal daily PRN Constipation  melatonin 3 milliGRAM(s) Oral at bedtime PRN Insomnia  ondansetron Injectable 4 milliGRAM(s) IV Push every 8 hours PRN Nausea and/or Vomiting  polyethylene glycol 3350 17 Gram(s) Oral daily PRN Constipation          Vital Signs Last 24 Hrs  T(C): 36.5 (05 Jan 2023 17:12), Max: 36.5 (05 Jan 2023 17:12)  T(F): 97.7 (05 Jan 2023 17:12), Max: 97.7 (05 Jan 2023 17:12)  HR: 110 (05 Jan 2023 17:12) (61 - 127)  BP: 119/80 (05 Jan 2023 17:12) (101/65 - 119/80)  BP(mean): --  RR: 18 (05 Jan 2023 17:12) (18 - 20)  SpO2: 99% (05 Jan 2023 17:12) (95% - 99%)    Parameters below as of 05 Jan 2023 17:12  Patient On (Oxygen Delivery Method): nasal cannula  O2 Flow (L/min): 3.5    I&O's Detail    04 Jan 2023 07:01  -  05 Jan 2023 07:00  --------------------------------------------------------  IN:    Oral Fluid: 50 mL  Total IN: 50 mL    OUT:    Voided (mL): 800 mL  Total OUT: 800 mL    Total NET: -750 mL      05 Jan 2023 07:01  -  05 Jan 2023 17:19  --------------------------------------------------------  IN:    Oral Fluid: 100 mL  Total IN: 100 mL    OUT:  Total OUT: 0 mL    Total NET: 100 mL      GENERAL:  86y/o Male NAD, resting comfortably.  HEAD:  Atraumatic, Normocephalic  EYES: EOMI, PERRLA, conjunctiva and sclera clear  NECK: Supple, No JVD, no cervical lymphadenopathy, non-tender  CHEST/LUNG: Clear to auscultation bilaterally; No wheeze, rhonchi, or rales  HEART: Regular rate and rhythm; S1&S2  ABDOMEN: Soft, Nontender, Nondistended x 4 quadrants; Bowel sounds present  EXTREMITIES:   Peripheral Pulses Present, No clubbing, no cyanosis, or no edema, no calf tenderness  PSYCH: AAOx3, cooperative, appropriate  NEUROLOGY: WNL  SKIN: WNL      EKG/ TELEM:    LABS:                          11.9   9.76  )-----------( 146      ( 04 Jan 2023 08:04 )             36.0       05 Jan 2023 07:40    148<H>  |  114<H>  |  58<H>  ----------------------------<  133<H>  4.0     |  21     |  1.5    04 Jan 2023 08:04    147<H>  |  113<H>  |  50<H>  ----------------------------<  134<H>  3.8     |  19     |  1.2      Ca    8.7        05 Jan 2023 07:40  Ca    8.5        04 Jan 2023 08:04                        Diagnostic testing:        Assessment and Plan:         Subjective/Objective:     Pt evaluated at bedside, NAD, currently on 3L O2 nc, 1:1 sitter at bedside      MEDICATIONS  (STANDING):  apixaban 5 milliGRAM(s) Oral two times a day  finasteride 5 milliGRAM(s) Oral daily  furosemide   Injectable 40 milliGRAM(s) IV Push once  latanoprost 0.005% Ophthalmic Solution 1 Drop(s) Both EYES at bedtime  memantine 10 milliGRAM(s) Oral two times a day  metoprolol tartrate 100 milliGRAM(s) Oral two times a day  mirabegron ER 50 milliGRAM(s) Oral daily  QUEtiapine 25 milliGRAM(s) Oral daily  senna 2 Tablet(s) Oral at bedtime  silodosin 8 milliGRAM(s) Oral daily    MEDICATIONS  (PRN):  acetaminophen     Tablet .. 650 milliGRAM(s) Oral every 6 hours PRN Temp greater or equal to 38C (100.4F), Mild Pain (1 - 3)  aluminum hydroxide/magnesium hydroxide/simethicone Suspension 30 milliLiter(s) Oral every 4 hours PRN Dyspepsia  bisacodyl Suppository 10 milliGRAM(s) Rectal daily PRN Constipation  melatonin 3 milliGRAM(s) Oral at bedtime PRN Insomnia  ondansetron Injectable 4 milliGRAM(s) IV Push every 8 hours PRN Nausea and/or Vomiting  polyethylene glycol 3350 17 Gram(s) Oral daily PRN Constipation          Vital Signs Last 24 Hrs  T(C): 36.5 (05 Jan 2023 17:12), Max: 36.5 (05 Jan 2023 17:12)  T(F): 97.7 (05 Jan 2023 17:12), Max: 97.7 (05 Jan 2023 17:12)  HR: 110 (05 Jan 2023 17:12) (61 - 127)  BP: 119/80 (05 Jan 2023 17:12) (101/65 - 119/80)  BP(mean): --  RR: 18 (05 Jan 2023 17:12) (18 - 20)  SpO2: 99% (05 Jan 2023 17:12) (95% - 99%)    Parameters below as of 05 Jan 2023 17:12  Patient On (Oxygen Delivery Method): nasal cannula  O2 Flow (L/min): 3.5    I&O's Detail    04 Jan 2023 07:01  -  05 Jan 2023 07:00  --------------------------------------------------------  IN:    Oral Fluid: 50 mL  Total IN: 50 mL    OUT:    Voided (mL): 800 mL  Total OUT: 800 mL    Total NET: -750 mL      05 Jan 2023 07:01  -  05 Jan 2023 17:19  --------------------------------------------------------  IN:    Oral Fluid: 100 mL  Total IN: 100 mL    OUT:  Total OUT: 0 mL    Total NET: 100 mL      GENERAL:  86y/o Male NAD, resting comfortably.  HEAD:  Atraumatic, Normocephalic  EYES: EOMI, PERRLA, conjunctiva and sclera clear  NECK: Supple, No JVD, no cervical lymphadenopathy, non-tender  CHEST/LUNG: Clear to auscultation bilaterally; No wheeze, rhonchi, or rales  HEART: Regular rate and rhythm; S1&S2  ABDOMEN: Soft, Nontender, Nondistended x 4 quadrants; Bowel sounds present  EXTREMITIES:   Peripheral Pulses Present, No clubbing, no cyanosis, or no edema, no calf tenderness  PSYCH: AAOx3, cooperative, appropriate  NEUROLOGY: WNL  SKIN: WNL      EKG/ TELEM:    LABS:                          11.9   9.76  )-----------( 146      ( 04 Jan 2023 08:04 )             36.0       05 Jan 2023 07:40    148<H>  |  114<H>  |  58<H>  ----------------------------<  133<H>  4.0     |  21     |  1.5    04 Jan 2023 08:04    147<H>  |  113<H>  |  50<H>  ----------------------------<  134<H>  3.8     |  19     |  1.2      Ca    8.7        05 Jan 2023 07:40  Ca    8.5        04 Jan 2023 08:04                        Diagnostic testing:    < from: TTE Echo Complete w/o Contrast w/ Doppler (12.30.22 @ 08:59) >  Summary:   1. LV Ejection Fraction by Galindo's Method with a biplane EF of 27 %.   2. Mildly enlarged left atrium.   3. Mildly enlarged right atrium.   4. Moderate to severe mitral valve regurgitation.   5. Mild tricuspid regurgitation.   6. Sclerotic aortic valve with normal opening.   7. There is mild aortic root calcification.    PHYSICIAN INTERPRETATION:  Left Ventricle: The left ventricular internal cavity size is normal.   Normal segmental left ventricular systolic function.  Left Atrium: Mildly enlarged left atrium.  Right Atrium: Mildly enlarged right atrium.  Mitral Valve: Moderate to severe mitral valve regurgitation is seen.  Tricuspid Valve: Mild tricuspid regurgitation is visualized.  Aortic Valve: Sclerotic aortic valve with normal opening.  Aorta: There is mild aortic root calcification.      < end of copied text >      Assessment and Plan:         Subjective/Objective:     Pt evaluated at bedside, NAD, currently on 3L O2 nc, 1:1 sitter at bedside      MEDICATIONS  (STANDING):  apixaban 5 milliGRAM(s) Oral two times a day  finasteride 5 milliGRAM(s) Oral daily  furosemide   Injectable 40 milliGRAM(s) IV Push once  latanoprost 0.005% Ophthalmic Solution 1 Drop(s) Both EYES at bedtime  memantine 10 milliGRAM(s) Oral two times a day  metoprolol tartrate 100 milliGRAM(s) Oral two times a day  mirabegron ER 50 milliGRAM(s) Oral daily  QUEtiapine 25 milliGRAM(s) Oral daily  senna 2 Tablet(s) Oral at bedtime  silodosin 8 milliGRAM(s) Oral daily    MEDICATIONS  (PRN):  acetaminophen     Tablet .. 650 milliGRAM(s) Oral every 6 hours PRN Temp greater or equal to 38C (100.4F), Mild Pain (1 - 3)  aluminum hydroxide/magnesium hydroxide/simethicone Suspension 30 milliLiter(s) Oral every 4 hours PRN Dyspepsia  bisacodyl Suppository 10 milliGRAM(s) Rectal daily PRN Constipation  melatonin 3 milliGRAM(s) Oral at bedtime PRN Insomnia  ondansetron Injectable 4 milliGRAM(s) IV Push every 8 hours PRN Nausea and/or Vomiting  polyethylene glycol 3350 17 Gram(s) Oral daily PRN Constipation          Vital Signs Last 24 Hrs  T(C): 36.5 (05 Jan 2023 17:12), Max: 36.5 (05 Jan 2023 17:12)  T(F): 97.7 (05 Jan 2023 17:12), Max: 97.7 (05 Jan 2023 17:12)  HR: 110 (05 Jan 2023 17:12) (61 - 127)  BP: 119/80 (05 Jan 2023 17:12) (101/65 - 119/80)  BP(mean): --  RR: 18 (05 Jan 2023 17:12) (18 - 20)  SpO2: 99% (05 Jan 2023 17:12) (95% - 99%)    Parameters below as of 05 Jan 2023 17:12  Patient On (Oxygen Delivery Method): nasal cannula  O2 Flow (L/min): 3.5    I&O's Detail    04 Jan 2023 07:01  -  05 Jan 2023 07:00  --------------------------------------------------------  IN:    Oral Fluid: 50 mL  Total IN: 50 mL    OUT:    Voided (mL): 800 mL  Total OUT: 800 mL    Total NET: -750 mL      05 Jan 2023 07:01  -  05 Jan 2023 17:19  --------------------------------------------------------  IN:    Oral Fluid: 100 mL  Total IN: 100 mL    OUT:  Total OUT: 0 mL    Total NET: 100 mL      GENERAL:  86y/o Male NAD, resting comfortably.  HEAD:  Atraumatic, Normocephalic  EYES: EOMI, PERRLA, conjunctiva and sclera clear  NECK: Supple, No JVD, no cervical lymphadenopathy, non-tender  CHEST/LUNG: Clear to auscultation bilaterally; No wheeze, rhonchi, or rales  HEART: Regular rate and rhythm; S1&S2  ABDOMEN: Soft, Nontender, Nondistended x 4 quadrants; Bowel sounds present  EXTREMITIES:   Peripheral Pulses Present, No clubbing, no cyanosis, or no edema, no calf tenderness  PSYCH: AAOx3, cooperative, appropriate  NEUROLOGY: WNL  SKIN: WNL      EKG/ TELEM:    LABS:                          11.9   9.76  )-----------( 146      ( 04 Jan 2023 08:04 )             36.0       05 Jan 2023 07:40    148<H>  |  114<H>  |  58<H>  ----------------------------<  133<H>  4.0     |  21     |  1.5    04 Jan 2023 08:04    147<H>  |  113<H>  |  50<H>  ----------------------------<  134<H>  3.8     |  19     |  1.2      Ca    8.7        05 Jan 2023 07:40  Ca    8.5        04 Jan 2023 08:04                        Diagnostic testing:    < from: TTE Echo Complete w/o Contrast w/ Doppler (12.30.22 @ 08:59) >  Summary:   1. LV Ejection Fraction by Galindo's Method with a biplane EF of 27 %.   2. Mildly enlarged left atrium.   3. Mildly enlarged right atrium.   4. Moderate to severe mitral valve regurgitation.   5. Mild tricuspid regurgitation.   6. Sclerotic aortic valve with normal opening.   7. There is mild aortic root calcification.    PHYSICIAN INTERPRETATION:  Left Ventricle: The left ventricular internal cavity size is normal.   Normal segmental left ventricular systolic function.  Left Atrium: Mildly enlarged left atrium.  Right Atrium: Mildly enlarged right atrium.  Mitral Valve: Moderate to severe mitral valve regurgitation is seen.  Tricuspid Valve: Mild tricuspid regurgitation is visualized.  Aortic Valve: Sclerotic aortic valve with normal opening.  Aorta: There is mild aortic root calcification.      < end of copied text >    `  Assessment and Plan:

## 2023-01-05 NOTE — PROGRESS NOTE ADULT - ASSESSMENT
87-y/o m pmh  HLD accompanied by his daughter c/o poor po intake, low urine output and leg swelling found in volume overload new onset afib.     TTE 12/30: EF 27%, mod-sev MR     Impression  # HFrEF EF 27% possibly tachyarrhythmia induced   # Afib  # Azotemia likely pre-renal    Plan  - currently appears volume overloaded, +b/l crackles, cxr w/ increasing pleural effusion  - will resume Lasix 40mg IVP qd  - strict I&O, daily weight  - consider lowering BB dose, favor adding Dig if needed for rate and inotropic support in setting of acute hf  - low dose Entresto 24/26 prior to discharge  - CHADs score: 3 on Eliquis 5 bid  - Monitor Lytes  - possible outpatient Adenocard thallium      87-y/o m pmh  HLD accompanied by his daughter c/o poor po intake, low urine output and leg swelling found in volume overload new onset afib.     TTE 12/30: EF 27%, mod-sev MR     Impression  # HFrEF EF 27% possibly tachyarrhythmia induced   # Afib  # Azotemia likely pre-renal    Plan  - currently appears volume overloaded, +b/l crackles, cxr w/ increasing pleural effusion  - will resume Lasix 40mg IVP qd  - strict I&O, daily weight  - consider lowering BB dose, favor adding Dig if needed for rate and inotropic support in setting of acute hf  - low dose Entresto 24/26 prior to discharge  - CHADs score: 3 on Eliquis 5 bid  - Monitor Lytes  - Adenocard thallium when stable     87-y/o m pmh  HLD accompanied by his daughter c/o poor po intake, low urine output and leg swelling found in volume overload new onset afib.     TTE 12/30: EF 27%, mod-sev MR     Impression  # HFrEF EF 27% possibly tachyarrhythmia induced   # Afib  # Azotemia likely pre-renal    Plan  - currently appears volume overloaded, +b/l crackles, cxr w/ increasing pleural effusion  - will resume Lasix 40mg IVP qd  - strict I&O, daily weight  - consider lowering BB dose, favor adding Dig if needed for rate and inotropic support in setting of acute hf  - low dose Entresto 24/26 prior to discharge  - CHADs score: 3 on Eliquis 5 bid  - Monitor Lytes  - possible Adenocard thallium when stable     87-y/o m pmh  HLD accompanied by his daughter c/o poor po intake, low urine output and leg swelling found in volume overload new onset afib.     TTE 12/30: EF 27%, mod-sev MR     Impression  # HFrEF EF 27% possibly tachyarrhythmia induced   # Afib  # Azotemia likely pre-renal    Plan  - currently volume overloaded, +b/l crackles, cxr w/ increasing pleural effusion  - will resume Lasix 40mg IVP qd  - strict I&O, daily weight  - consider lowering BB dose, favor adding Dig if needed for rate and inotropic support in setting of acute hf  - low dose Entresto 24/26 prior to dc  - CHADs score: 3 on Eliquis 5 bid  - Monitor Lytes  - possible Adenocard thallium when stable

## 2023-01-06 LAB
ANION GAP SERPL CALC-SCNC: 10 MMOL/L — SIGNIFICANT CHANGE UP (ref 7–14)
BASOPHILS # BLD AUTO: 0.01 K/UL — SIGNIFICANT CHANGE UP (ref 0–0.2)
BASOPHILS NFR BLD AUTO: 0.1 % — SIGNIFICANT CHANGE UP (ref 0–1)
BUN SERPL-MCNC: 47 MG/DL — HIGH (ref 10–20)
CALCIUM SERPL-MCNC: 8.4 MG/DL — SIGNIFICANT CHANGE UP (ref 8.4–10.5)
CHLORIDE SERPL-SCNC: 114 MMOL/L — HIGH (ref 98–110)
CO2 SERPL-SCNC: 26 MMOL/L — SIGNIFICANT CHANGE UP (ref 17–32)
CREAT SERPL-MCNC: 1.3 MG/DL — SIGNIFICANT CHANGE UP (ref 0.7–1.5)
EGFR: 53 ML/MIN/1.73M2 — LOW
EOSINOPHIL # BLD AUTO: 0.1 K/UL — SIGNIFICANT CHANGE UP (ref 0–0.7)
EOSINOPHIL NFR BLD AUTO: 0.9 % — SIGNIFICANT CHANGE UP (ref 0–8)
GLUCOSE SERPL-MCNC: 116 MG/DL — HIGH (ref 70–99)
HCT VFR BLD CALC: 37.9 % — LOW (ref 42–52)
HGB BLD-MCNC: 12.1 G/DL — LOW (ref 14–18)
IMM GRANULOCYTES NFR BLD AUTO: 0.4 % — HIGH (ref 0.1–0.3)
LYMPHOCYTES # BLD AUTO: 0.61 K/UL — LOW (ref 1.2–3.4)
LYMPHOCYTES # BLD AUTO: 5.4 % — LOW (ref 20.5–51.1)
MCHC RBC-ENTMCNC: 28.5 PG — SIGNIFICANT CHANGE UP (ref 27–31)
MCHC RBC-ENTMCNC: 31.9 G/DL — LOW (ref 32–37)
MCV RBC AUTO: 89.4 FL — SIGNIFICANT CHANGE UP (ref 80–94)
MONOCYTES # BLD AUTO: 0.81 K/UL — HIGH (ref 0.1–0.6)
MONOCYTES NFR BLD AUTO: 7.2 % — SIGNIFICANT CHANGE UP (ref 1.7–9.3)
NEUTROPHILS # BLD AUTO: 9.68 K/UL — HIGH (ref 1.4–6.5)
NEUTROPHILS NFR BLD AUTO: 86 % — HIGH (ref 42.2–75.2)
NRBC # BLD: 0 /100 WBCS — SIGNIFICANT CHANGE UP (ref 0–0)
PLATELET # BLD AUTO: 167 K/UL — SIGNIFICANT CHANGE UP (ref 130–400)
POTASSIUM SERPL-MCNC: 3.5 MMOL/L — SIGNIFICANT CHANGE UP (ref 3.5–5)
POTASSIUM SERPL-SCNC: 3.5 MMOL/L — SIGNIFICANT CHANGE UP (ref 3.5–5)
RBC # BLD: 4.24 M/UL — LOW (ref 4.7–6.1)
RBC # FLD: 14.4 % — SIGNIFICANT CHANGE UP (ref 11.5–14.5)
SODIUM SERPL-SCNC: 150 MMOL/L — HIGH (ref 135–146)
WBC # BLD: 11.26 K/UL — HIGH (ref 4.8–10.8)
WBC # FLD AUTO: 11.26 K/UL — HIGH (ref 4.8–10.8)

## 2023-01-06 PROCEDURE — 99232 SBSQ HOSP IP/OBS MODERATE 35: CPT

## 2023-01-06 RX ORDER — FUROSEMIDE 40 MG
20 TABLET ORAL DAILY
Refills: 0 | Status: DISCONTINUED | OUTPATIENT
Start: 2023-01-06 | End: 2023-01-09

## 2023-01-06 RX ADMIN — APIXABAN 5 MILLIGRAM(S): 2.5 TABLET, FILM COATED ORAL at 08:21

## 2023-01-06 RX ADMIN — APIXABAN 5 MILLIGRAM(S): 2.5 TABLET, FILM COATED ORAL at 17:57

## 2023-01-06 RX ADMIN — FINASTERIDE 5 MILLIGRAM(S): 5 TABLET, FILM COATED ORAL at 13:33

## 2023-01-06 RX ADMIN — LATANOPROST 1 DROP(S): 0.05 SOLUTION/ DROPS OPHTHALMIC; TOPICAL at 22:57

## 2023-01-06 RX ADMIN — SENNA PLUS 2 TABLET(S): 8.6 TABLET ORAL at 22:57

## 2023-01-06 RX ADMIN — MIRABEGRON 50 MILLIGRAM(S): 50 TABLET, EXTENDED RELEASE ORAL at 13:35

## 2023-01-06 RX ADMIN — SILODOSIN 8 MILLIGRAM(S): 4 CAPSULE ORAL at 13:41

## 2023-01-06 RX ADMIN — MEMANTINE HYDROCHLORIDE 10 MILLIGRAM(S): 10 TABLET ORAL at 08:22

## 2023-01-06 RX ADMIN — Medication 100 MILLIGRAM(S): at 17:57

## 2023-01-06 RX ADMIN — MEMANTINE HYDROCHLORIDE 10 MILLIGRAM(S): 10 TABLET ORAL at 17:57

## 2023-01-06 RX ADMIN — Medication 20 MILLIGRAM(S): at 19:55

## 2023-01-06 RX ADMIN — Medication 100 MILLIGRAM(S): at 08:23

## 2023-01-06 RX ADMIN — QUETIAPINE FUMARATE 25 MILLIGRAM(S): 200 TABLET, FILM COATED ORAL at 22:57

## 2023-01-06 NOTE — PROGRESS NOTE ADULT - ASSESSMENT
MEDICATIONS  (STANDING):  apixaban 5 milliGRAM(s) Oral two times a day  finasteride 5 milliGRAM(s) Oral daily  latanoprost 0.005% Ophthalmic Solution 1 Drop(s) Both EYES at bedtime  memantine 10 milliGRAM(s) Oral two times a day  metoprolol tartrate 100 milliGRAM(s) Oral two times a day  mirabegron ER 50 milliGRAM(s) Oral daily  QUEtiapine 25 milliGRAM(s) Oral daily  senna 2 Tablet(s) Oral at bedtime  silodosin 8 milliGRAM(s) Oral daily    MEDICATIONS  (PRN):  acetaminophen     Tablet .. 650 milliGRAM(s) Oral every 6 hours PRN Temp greater or equal to 38C (100.4F), Mild Pain (1 - 3)  aluminum hydroxide/magnesium hydroxide/simethicone Suspension 30 milliLiter(s) Oral every 4 hours PRN Dyspepsia  bisacodyl Suppository 10 milliGRAM(s) Rectal daily PRN Constipation  melatonin 3 milliGRAM(s) Oral at bedtime PRN Insomnia  ondansetron Injectable 4 milliGRAM(s) IV Push every 8 hours PRN Nausea and/or Vomiting  polyethylene glycol 3350 17 Gram(s) Oral daily PRN Constipation      87-year-old male accompanied by his daughter c/o poor po intake, low urine output and leg swelling. Pt with a past medical history of Alzheimer's disease, BPH, hyperlipidemia, presents emerged department for weakness.  In addition  daughter states he  had a fall today hit back of head, also notes worsening lower extremity edema.  Daughter states patient has worsening weakness today.    Assessment /Plan   New onset Atrial fibrillation s/p RVR   [id number]: 2   - Eliquis ([facility] pharmacy, $0 copay)  Cardiology consult recs appreciated  - TSH 5.04, free t4  - Increase Metoprolol 50mg BID --> 100mg BID   - ECHO EF 28 % with severe MVR   - trop x 1-ve   - Daughter agrees with anticoagulation, risks were discussed at length previously regarding complications with bleeding especially with hx of falls, daughter agrees with anticoagulation and verbalized understanding of the risks.      Severe MVR with fluid overload acute systolic CHF   - stop iv fluids   - lasix 40 mg IV x1    - daily weights  - stat Chest X-Ray Personally reviewed: pulmonary vascular congestion.   - cont metoprolol   - ischemic eval timing per cardiology , may need mitraclip via catheter, ? if candidate.     Dementia with weakness with dysphagia  - SLP swallow eval failed, family okay with comfort foods, puree with TL.  aspiration precautions   -CT head unremarkable   - PT eval/treat, JULI , seen by physiatry   - B12 351, TSH (low), RPR (-), Ammonia 21  - UA not c/w UTI   - cont Namenda   fall risk protocol   - Consult- neurology recs seroquel 25 mg daily 50 mg HS     BPH   - on rapaflo and  finasteride, which may result in syncope  - monitor     Glaucoma   - cont home eye drops     DVT/GI  px  - eliquis/ on a diet     DNR/DNI per daughter, health care proxy     DISPO: STR/JULI , Barriers, fluid overload,  , reeaval in am.    MEDICATIONS  (STANDING):  apixaban 5 milliGRAM(s) Oral two times a day  finasteride 5 milliGRAM(s) Oral daily  latanoprost 0.005% Ophthalmic Solution 1 Drop(s) Both EYES at bedtime  memantine 10 milliGRAM(s) Oral two times a day  metoprolol tartrate 100 milliGRAM(s) Oral two times a day  mirabegron ER 50 milliGRAM(s) Oral daily  QUEtiapine 25 milliGRAM(s) Oral daily  senna 2 Tablet(s) Oral at bedtime  silodosin 8 milliGRAM(s) Oral daily    MEDICATIONS  (PRN):  acetaminophen     Tablet .. 650 milliGRAM(s) Oral every 6 hours PRN Temp greater or equal to 38C (100.4F), Mild Pain (1 - 3)  aluminum hydroxide/magnesium hydroxide/simethicone Suspension 30 milliLiter(s) Oral every 4 hours PRN Dyspepsia  bisacodyl Suppository 10 milliGRAM(s) Rectal daily PRN Constipation  melatonin 3 milliGRAM(s) Oral at bedtime PRN Insomnia  ondansetron Injectable 4 milliGRAM(s) IV Push every 8 hours PRN Nausea and/or Vomiting  polyethylene glycol 3350 17 Gram(s) Oral daily PRN Constipation      87-year-old male accompanied by his daughter c/o poor po intake, low urine output and leg swelling. Pt with a past medical history of Alzheimer's disease, BPH, hyperlipidemia, presents emerged department for weakness.  In addition  daughter states he  had a fall today hit back of head, also notes worsening lower extremity edema.  Daughter states patient has worsening weakness today.    Assessment /Plan   New onset Atrial fibrillation s/p RVR   [id number]: 2   - Eliquis ([facility] pharmacy, $0 copay)  Cardiology consult recs appreciated  - TSH 5.04, free t4  - Increase Metoprolol 50mg BID --> 100mg BID   - ECHO EF 28 % with severe MVR   - trop x 1-ve   - Daughter agrees with anticoagulation, risks were discussed at length previously regarding complications with bleeding especially with hx of falls, daughter agrees with anticoagulation and verbalized understanding of the risks.      Severe MVR with fluid overload acute systolic CHF   - stop iv fluids   - lasix 40 mg IV x1 , change to lasix 20 mg po daily   - daily weights   - cont metoprolol   - ischemic eval timing per cardiology , may need mitraclip via catheter, ? if candidate.     Dementia with weakness with dysphagia  - SLP swallow eval failed, family okay with comfort foods, puree with TL.  aspiration precautions   -CT head unremarkable   - PT eval/treat, JULI , seen by physiatry   - B12 351, TSH (low), RPR (-), Ammonia 21  - UA not c/w UTI   - cont Namenda   fall risk protocol   - Consult- neurology recs seroquel 25 mg daily 50 mg HS     BPH   - on rapaflo and  finasteride, which may result in syncope  - monitor     Glaucoma   - cont home eye drops     DVT/GI  px  - eliquis/ on a diet     DNR/DNI per daughter, health care proxy     DISPO: STR/JULI , Barriers, medically stable.

## 2023-01-06 NOTE — PROGRESS NOTE ADULT - SUBJECTIVE AND OBJECTIVE BOX
GORDON DE LA [id number]    Subjective/Interval History       ROS    PHYSICAL EXAM  Vital Signs Last 24 Hrs  T(C): 36.1 (06 Jan 2023 13:09), Max: 36.5 (05 Jan 2023 17:12)  T(F): 96.9 (06 Jan 2023 13:09), Max: 97.7 (05 Jan 2023 17:12)  HR: 89 (06 Jan 2023 13:09) (57 - 110)  BP: 104/68 (06 Jan 2023 13:09) (103/59 - 128/78)  BP(mean): --  RR: 18 (06 Jan 2023 13:09) (18 - 18)  SpO2: 92% (06 Jan 2023 13:09) (92% - 99%)    Parameters below as of 06 Jan 2023 13:09  Patient On (Oxygen Delivery Method): nasal cannula      GA : AAOX3, NAD   HEENT: PERRLA, EOMI  NECK: no JVD, no thyromegaly   CVS: S1 S2 no murmur no rubs no gallop  RESP: CTAB no wheeze, no rhonchi no rales  ABD: Soft, NT, ND, tympanic, no rebound or guarding   : No Melgar, No CVA tenderness   EXT; no pedal edema, no cyanosis  MSK: No ML spinal tenderness, normal ROM   NEURO: AAOX3, no new focal deficits     LABS/ IMAGING                        12.1   11.26 )-----------( 167      ( 06 Jan 2023 07:27 )             37.9         01-06    150<H>  |  114<H>  |  47<H>  ----------------------------<  116<H>  3.5   |  26  |  1.3    Ca    8.4      06 Jan 2023 07:27      CAPILLARY BLOOD GLUCOSE      POCT Blood Glucose.: 150 mg/dL (05 Jan 2023 18:35)               GORDON Edna    Subjective/Interval History   eating by himself, not SOB, on RA     ROS  - unable to obtain due to dementia.     PHYSICAL EXAM  Vital Signs Last 24 Hrs  T(C): 36.1 (06 Jan 2023 13:09), Max: 36.5 (05 Jan 2023 17:12)  T(F): 96.9 (06 Jan 2023 13:09), Max: 97.7 (05 Jan 2023 17:12)  HR: 89 (06 Jan 2023 13:09) (57 - 110)  BP: 104/68 (06 Jan 2023 13:09) (103/59 - 128/78)  BP(mean): --  RR: 18 (06 Jan 2023 13:09) (18 - 18)  SpO2: 92% (06 Jan 2023 13:09) (92% - 99%)    Parameters below as of 06 Jan 2023 13:09  Patient On (Oxygen Delivery Method): nasal cannula      GA : AAOX1 demented , NAD   HEENT: PERRLA, EOMI  NECK: no JVD, no thyromegaly   CVS: S1 S2 no murmur no rubs no gallop, irregularly irregular   RESP:  no wheeze, no rhonchi, bibasilar rales  ABD: Soft, NT, ND, tympanic, no rebound or guarding   : No Melgar, No CVA tenderness   EXT; no pedal edema, no cyanosis  MSK: No ML spinal tenderness, normal ROM   NEURO: AAOX1, no new focal deficits     LABS/ IMAGING                        12.1   11.26 )-----------( 167      ( 06 Jan 2023 07:27 )             37.9         01-06    150<H>  |  114<H>  |  47<H>  ----------------------------<  116<H>  3.5   |  26  |  1.3    Ca    8.4      06 Jan 2023 07:27      CAPILLARY BLOOD GLUCOSE      POCT Blood Glucose.: 150 mg/dL (05 Jan 2023 18:35)

## 2023-01-07 LAB
ANION GAP SERPL CALC-SCNC: 12 MMOL/L — SIGNIFICANT CHANGE UP (ref 7–14)
BASOPHILS # BLD AUTO: 0.01 K/UL — SIGNIFICANT CHANGE UP (ref 0–0.2)
BASOPHILS NFR BLD AUTO: 0.1 % — SIGNIFICANT CHANGE UP (ref 0–1)
BUN SERPL-MCNC: 37 MG/DL — HIGH (ref 10–20)
CALCIUM SERPL-MCNC: 8.5 MG/DL — SIGNIFICANT CHANGE UP (ref 8.4–10.5)
CHLORIDE SERPL-SCNC: 106 MMOL/L — SIGNIFICANT CHANGE UP (ref 98–110)
CO2 SERPL-SCNC: 26 MMOL/L — SIGNIFICANT CHANGE UP (ref 17–32)
CREAT SERPL-MCNC: 1.1 MG/DL — SIGNIFICANT CHANGE UP (ref 0.7–1.5)
EGFR: 65 ML/MIN/1.73M2 — SIGNIFICANT CHANGE UP
EOSINOPHIL # BLD AUTO: 0.14 K/UL — SIGNIFICANT CHANGE UP (ref 0–0.7)
EOSINOPHIL NFR BLD AUTO: 1.6 % — SIGNIFICANT CHANGE UP (ref 0–8)
GLUCOSE SERPL-MCNC: 123 MG/DL — HIGH (ref 70–99)
HCT VFR BLD CALC: 40.4 % — LOW (ref 42–52)
HGB BLD-MCNC: 13.2 G/DL — LOW (ref 14–18)
IMM GRANULOCYTES NFR BLD AUTO: 0.3 % — SIGNIFICANT CHANGE UP (ref 0.1–0.3)
LYMPHOCYTES # BLD AUTO: 0.87 K/UL — LOW (ref 1.2–3.4)
LYMPHOCYTES # BLD AUTO: 9.8 % — LOW (ref 20.5–51.1)
MCHC RBC-ENTMCNC: 28.6 PG — SIGNIFICANT CHANGE UP (ref 27–31)
MCHC RBC-ENTMCNC: 32.7 G/DL — SIGNIFICANT CHANGE UP (ref 32–37)
MCV RBC AUTO: 87.6 FL — SIGNIFICANT CHANGE UP (ref 80–94)
MONOCYTES # BLD AUTO: 0.76 K/UL — HIGH (ref 0.1–0.6)
MONOCYTES NFR BLD AUTO: 8.6 % — SIGNIFICANT CHANGE UP (ref 1.7–9.3)
NEUTROPHILS # BLD AUTO: 7.07 K/UL — HIGH (ref 1.4–6.5)
NEUTROPHILS NFR BLD AUTO: 79.6 % — HIGH (ref 42.2–75.2)
NRBC # BLD: 0 /100 WBCS — SIGNIFICANT CHANGE UP (ref 0–0)
PLATELET # BLD AUTO: 186 K/UL — SIGNIFICANT CHANGE UP (ref 130–400)
POTASSIUM SERPL-MCNC: 3.6 MMOL/L — SIGNIFICANT CHANGE UP (ref 3.5–5)
POTASSIUM SERPL-SCNC: 3.6 MMOL/L — SIGNIFICANT CHANGE UP (ref 3.5–5)
RBC # BLD: 4.61 M/UL — LOW (ref 4.7–6.1)
RBC # FLD: 14.5 % — SIGNIFICANT CHANGE UP (ref 11.5–14.5)
SODIUM SERPL-SCNC: 144 MMOL/L — SIGNIFICANT CHANGE UP (ref 135–146)
WBC # BLD: 8.88 K/UL — SIGNIFICANT CHANGE UP (ref 4.8–10.8)
WBC # FLD AUTO: 8.88 K/UL — SIGNIFICANT CHANGE UP (ref 4.8–10.8)

## 2023-01-07 PROCEDURE — 99232 SBSQ HOSP IP/OBS MODERATE 35: CPT

## 2023-01-07 RX ORDER — SACUBITRIL AND VALSARTAN 24; 26 MG/1; MG/1
1 TABLET, FILM COATED ORAL
Refills: 0 | Status: DISCONTINUED | OUTPATIENT
Start: 2023-01-07 | End: 2023-01-09

## 2023-01-07 RX ADMIN — MIRABEGRON 50 MILLIGRAM(S): 50 TABLET, EXTENDED RELEASE ORAL at 12:06

## 2023-01-07 RX ADMIN — LATANOPROST 1 DROP(S): 0.05 SOLUTION/ DROPS OPHTHALMIC; TOPICAL at 22:29

## 2023-01-07 RX ADMIN — Medication 3 MILLIGRAM(S): at 22:29

## 2023-01-07 RX ADMIN — MEMANTINE HYDROCHLORIDE 10 MILLIGRAM(S): 10 TABLET ORAL at 18:18

## 2023-01-07 RX ADMIN — FINASTERIDE 5 MILLIGRAM(S): 5 TABLET, FILM COATED ORAL at 12:06

## 2023-01-07 RX ADMIN — QUETIAPINE FUMARATE 25 MILLIGRAM(S): 200 TABLET, FILM COATED ORAL at 12:06

## 2023-01-07 RX ADMIN — SILODOSIN 8 MILLIGRAM(S): 4 CAPSULE ORAL at 12:06

## 2023-01-07 RX ADMIN — Medication 20 MILLIGRAM(S): at 05:56

## 2023-01-07 RX ADMIN — SENNA PLUS 2 TABLET(S): 8.6 TABLET ORAL at 22:28

## 2023-01-07 RX ADMIN — APIXABAN 5 MILLIGRAM(S): 2.5 TABLET, FILM COATED ORAL at 05:56

## 2023-01-07 RX ADMIN — Medication 650 MILLIGRAM(S): at 23:04

## 2023-01-07 RX ADMIN — Medication 100 MILLIGRAM(S): at 05:55

## 2023-01-07 RX ADMIN — MEMANTINE HYDROCHLORIDE 10 MILLIGRAM(S): 10 TABLET ORAL at 05:56

## 2023-01-07 RX ADMIN — Medication 100 MILLIGRAM(S): at 18:18

## 2023-01-07 RX ADMIN — APIXABAN 5 MILLIGRAM(S): 2.5 TABLET, FILM COATED ORAL at 18:18

## 2023-01-07 RX ADMIN — Medication 650 MILLIGRAM(S): at 22:28

## 2023-01-07 NOTE — PROGRESS NOTE ADULT - ASSESSMENT
87-y/o m pmh  HLD accompanied by his daughter c/o poor po intake, low urine output and leg swelling found in volume overload new onset afib.     TTE 12/30: EF 27%, mod-sev MR     Impression  # HFrEF EF 27% possibly tachyarrhythmia induced   # Afib  # Azotemia likely pre-renal    Plan  - currently volume overloaded, +b/l crackles, cxr w/ increasing pleural effusion  - will resume Lasix 40mg IVP qd  - strict I&O, daily weight  - consider lowering BB dose, favor adding Dig if needed for rate and inotropic support in setting of acute hf  - low dose Entresto 24/26 prior to dc  - CHADs score: 3 on Eliquis 5 bid  - Monitor Lytes  - possible Adenocard thallium when stable   87-y/o m pmh  HLD accompanied by his daughter c/o poor po intake, low urine output and leg swelling found in volume overload new onset afib.     TTE 12/30: EF 27%, mod-sev MR     Impression  # HFrEF EF 27% possibly tachyarrhythmia induced   # Afib  # Azotemia likely pre-renal    Plan  - Appears still mildly volume overloaded, but improved +b/l mild rales,, Cxr w/ increasing pleural effusion  - Agree with decreasing Lasix dose to 20  - Strict I&O, daily weight, maintain euvolemic  - Consider lowering BB dose. Would benefit adding Dig if needed for rate and inotropic support in setting of acute HF  - Start low dose Entresto 24/26 prior to discharge  - CHADs score: 3 on Eliquis 5 bid  - Given Severe MR on echo, consider eval for Yohana clip outpatient     87-y/o m pmh  HLD accompanied by his daughter c/o poor po intake, low urine output and leg swelling found in volume overload new onset afib.     TTE 12/30: EF 27%, mod-sev MR     Impression  # HFrEF EF 27% possibly tachyarrhythmia induced   # Afib  # Azotemia likely pre-renal    Plan  - Appears still mildly volume overloaded, but improved +b/l mild rales,, Cxr w/ increasing pleural effusion  - Agree with decreasing Lasix dose to 20  - Strict I&O, daily weight, maintain euvolemic  - Consider lowering BB dose. Would benefit adding Digoxin for rate and inotropic support in setting of acute HF  - Start low dose Entresto 24/26 prior to discharge  - Recheck pBNP prior todischarge  - CHADs score: 3 on Eliquis 5 bid  - Given Severe MR on echo, consider eval for Yohana clip outpatient

## 2023-01-07 NOTE — PROGRESS NOTE ADULT - SUBJECTIVE AND OBJECTIVE BOX
Subjective/Objective:     HPI-Cardiology/Events/Updates  No overnight events. On monitor appears in AFib and is rate controlled with HR . Radiology tests and hospital records, were reviewed, as well as previous notes on this patient.         MEDICATIONS  (STANDING):  apixaban 5 milliGRAM(s) Oral two times a day  finasteride 5 milliGRAM(s) Oral daily  furosemide    Tablet 20 milliGRAM(s) Oral daily  latanoprost 0.005% Ophthalmic Solution 1 Drop(s) Both EYES at bedtime  memantine 10 milliGRAM(s) Oral two times a day  metoprolol tartrate 100 milliGRAM(s) Oral two times a day  mirabegron ER 50 milliGRAM(s) Oral daily  QUEtiapine 25 milliGRAM(s) Oral daily  senna 2 Tablet(s) Oral at bedtime  silodosin 8 milliGRAM(s) Oral daily    MEDICATIONS  (PRN):  acetaminophen     Tablet .. 650 milliGRAM(s) Oral every 6 hours PRN Temp greater or equal to 38C (100.4F), Mild Pain (1 - 3)  aluminum hydroxide/magnesium hydroxide/simethicone Suspension 30 milliLiter(s) Oral every 4 hours PRN Dyspepsia  bisacodyl Suppository 10 milliGRAM(s) Rectal daily PRN Constipation  melatonin 3 milliGRAM(s) Oral at bedtime PRN Insomnia  ondansetron Injectable 4 milliGRAM(s) IV Push every 8 hours PRN Nausea and/or Vomiting  polyethylene glycol 3350 17 Gram(s) Oral daily PRN Constipation        Vital Signs Last 24 Hrs  T(C): 36.5 (07 Jan 2023 14:23), Max: 36.5 (06 Jan 2023 20:51)  T(F): 97.7 (07 Jan 2023 14:23), Max: 97.7 (06 Jan 2023 20:51)  HR: 98 (07 Jan 2023 14:23) (91 - 112)  BP: 110/80 (07 Jan 2023 14:23) (102/75 - 110/80)  BP(mean): --  RR: 18 (07 Jan 2023 14:23) (18 - 18)  SpO2: 95% (07 Jan 2023 14:23) (95% - 98%)    Parameters below as of 06 Jan 2023 21:30  Patient On (Oxygen Delivery Method): room air      I&O's Detail    06 Jan 2023 07:01  -  07 Jan 2023 07:00  --------------------------------------------------------  IN:    Oral Fluid: 120 mL  Total IN: 120 mL    OUT:    Voided (mL): 770 mL  Total OUT: 770 mL    Total NET: -650 mL      GENERAL:  88y/o Male NAD, resting comfortably.  HEAD:  Atraumatic, Normocephalic  EYES: EOMI, PERRLA, conjunctiva and sclera clear  NECK: Supple, No JVD, no cervical lymphadenopathy, non-tender  CHEST/LUNG: mild rales on ascultation B/L; No wheeze, rhonchi, or rales  HEART: Irregular rate and rhythm; S1&S2  ABDOMEN: Soft, Nontender, Nondistended x 4 quadrants; Bowel sounds present  EXTREMITIES: B/l LE pitting edema, Peripheral Pulses Present, No clubbing, no cyanosis, or no edema, no calf tenderness  PSYCH: AAOx3, cooperative, appropriate  NEUROLOGY: WNL  SKIN: WNL        EKG/ TELEM:      LABS:                          13.2   8.88  )-----------( 186      ( 07 Jan 2023 06:54 )             40.4       07 Jan 2023 06:54    144    |  106    |  37<H>  ----------------------------<  123<H>  3.6     |  26     |  1.1    06 Jan 2023 07:27    150<H>  |  114<H>  |  47<H>  ----------------------------<  116<H>  3.5     |  26     |  1.3      Ca    8.5        07 Jan 2023 06:54  Ca    8.4        06 Jan 2023 07:27          Diagnostic testing:    < from: Xray Chest 1 View- PORTABLE-Routine (Xray Chest 1 View- PORTABLE-Routine .) (01.05.23 @ 15:25) >  Impression:    Bilateral pleural effusions and opacities, increased.    --- End of Report ---    < end of copied text >            < from: TTE Echo Complete w/o Contrast w/ Doppler (12.30.22 @ 08:59) >    Summary:   1. LV Ejection Fraction by Galindo's Method with a biplane EF of 27 %.   2. Mildly enlarged left atrium.   3. Mildly enlarged right atrium.   4. Moderate to severe mitral valve regurgitation.   5. Mild tricuspid regurgitation.   6. Sclerotic aortic valve with normal opening.   7. There is mild aortic root calcification.    < end of copied text >       Subjective/Objective:     HPI-Cardiology/Events/Updates  No overnight events. Pt evaluated at bedside. Pt A&O x1. On monitor appears in AFib and is rate controlled with HR . Radiology tests and hospital records, were reviewed, as well as previous notes on this patient.         MEDICATIONS  (STANDING):  apixaban 5 milliGRAM(s) Oral two times a day  finasteride 5 milliGRAM(s) Oral daily  furosemide    Tablet 20 milliGRAM(s) Oral daily  latanoprost 0.005% Ophthalmic Solution 1 Drop(s) Both EYES at bedtime  memantine 10 milliGRAM(s) Oral two times a day  metoprolol tartrate 100 milliGRAM(s) Oral two times a day  mirabegron ER 50 milliGRAM(s) Oral daily  QUEtiapine 25 milliGRAM(s) Oral daily  senna 2 Tablet(s) Oral at bedtime  silodosin 8 milliGRAM(s) Oral daily    MEDICATIONS  (PRN):  acetaminophen     Tablet .. 650 milliGRAM(s) Oral every 6 hours PRN Temp greater or equal to 38C (100.4F), Mild Pain (1 - 3)  aluminum hydroxide/magnesium hydroxide/simethicone Suspension 30 milliLiter(s) Oral every 4 hours PRN Dyspepsia  bisacodyl Suppository 10 milliGRAM(s) Rectal daily PRN Constipation  melatonin 3 milliGRAM(s) Oral at bedtime PRN Insomnia  ondansetron Injectable 4 milliGRAM(s) IV Push every 8 hours PRN Nausea and/or Vomiting  polyethylene glycol 3350 17 Gram(s) Oral daily PRN Constipation        Vital Signs Last 24 Hrs  T(C): 36.5 (07 Jan 2023 14:23), Max: 36.5 (06 Jan 2023 20:51)  T(F): 97.7 (07 Jan 2023 14:23), Max: 97.7 (06 Jan 2023 20:51)  HR: 98 (07 Jan 2023 14:23) (91 - 112)  BP: 110/80 (07 Jan 2023 14:23) (102/75 - 110/80)  BP(mean): --  RR: 18 (07 Jan 2023 14:23) (18 - 18)  SpO2: 95% (07 Jan 2023 14:23) (95% - 98%)    Parameters below as of 06 Jan 2023 21:30  Patient On (Oxygen Delivery Method): room air      I&O's Detail    06 Jan 2023 07:01  -  07 Jan 2023 07:00  --------------------------------------------------------  IN:    Oral Fluid: 120 mL  Total IN: 120 mL    OUT:    Voided (mL): 770 mL  Total OUT: 770 mL    Total NET: -650 mL      GENERAL:  86y/o Male NAD, resting comfortably.  HEAD:  Atraumatic, Normocephalic  EYES: EOMI, PERRLA, conjunctiva and sclera clear  NECK: Supple, No JVD, no cervical lymphadenopathy, non-tender  CHEST/LUNG: mild rales on ascultation B/L; No wheeze, rhonchi, or rales  HEART: Irregular rate and rhythm; S1&S2  ABDOMEN: Soft, Nontender, Nondistended x 4 quadrants; Bowel sounds present  EXTREMITIES: trace B/l LE pitting edema, Peripheral Pulses Present, No clubbing, no cyanosis, or no edema, no calf tenderness  PSYCH: AAOx1  NEUROLOGY: WNL  SKIN: WNL        EKG/ TELEM:  < from: 12 Lead ECG (01.02.23 @ 13:45) >   Atrial fibrillation with rapid ventricular response  Left axis deviation  Non-specific intra-ventricular conduction block  Abnormal ECG    Confirmed by KURTIS HERNANDEZ MD ([id number]) on 1/3/2023 9:46:02 AM    < end of copied text >      LABS:                          13.2   8.88  )-----------( 186      ( 07 Jan 2023 06:54 )             40.4       07 Jan 2023 06:54    144    |  106    |  37<H>  ----------------------------<  123<H>  3.6     |  26     |  1.1    06 Jan 2023 07:27    150<H>  |  114<H>  |  47<H>  ----------------------------<  116<H>  3.5     |  26     |  1.3      Ca    8.5        07 Jan 2023 06:54  Ca    8.4        06 Jan 2023 07:27        Diagnostic testing:    < from: Xray Chest 1 View- PORTABLE-Routine (Xray Chest 1 View- PORTABLE-Routine .) (01.05.23 @ 15:25) >  Impression:    Bilateral pleural effusions and opacities, increased.    --- End of Report ---    < end of copied text >            < from: TTE Echo Complete w/o Contrast w/ Doppler (12.30.22 @ 08:59) >    Summary:   1. LV Ejection Fraction by Galindo's Method with a biplane EF of 27 %.   2. Mildly enlarged left atrium.   3. Mildly enlarged right atrium.   4. Moderate to severe mitral valve regurgitation.   5. Mild tricuspid regurgitation.   6. Sclerotic aortic valve with normal opening.   7. There is mild aortic root calcification.    < end of copied text >

## 2023-01-07 NOTE — PROGRESS NOTE ADULT - ASSESSMENT
MEDICATIONS  (STANDING):  apixaban 5 milliGRAM(s) Oral two times a day  finasteride 5 milliGRAM(s) Oral daily  latanoprost 0.005% Ophthalmic Solution 1 Drop(s) Both EYES at bedtime  memantine 10 milliGRAM(s) Oral two times a day  metoprolol tartrate 100 milliGRAM(s) Oral two times a day  mirabegron ER 50 milliGRAM(s) Oral daily  QUEtiapine 25 milliGRAM(s) Oral daily  senna 2 Tablet(s) Oral at bedtime  silodosin 8 milliGRAM(s) Oral daily    MEDICATIONS  (PRN):  acetaminophen     Tablet .. 650 milliGRAM(s) Oral every 6 hours PRN Temp greater or equal to 38C (100.4F), Mild Pain (1 - 3)  aluminum hydroxide/magnesium hydroxide/simethicone Suspension 30 milliLiter(s) Oral every 4 hours PRN Dyspepsia  bisacodyl Suppository 10 milliGRAM(s) Rectal daily PRN Constipation  melatonin 3 milliGRAM(s) Oral at bedtime PRN Insomnia  ondansetron Injectable 4 milliGRAM(s) IV Push every 8 hours PRN Nausea and/or Vomiting  polyethylene glycol 3350 17 Gram(s) Oral daily PRN Constipation      87-year-old male accompanied by his daughter c/o poor po intake, low urine output and leg swelling. Pt with a past medical history of Alzheimer's disease, BPH, hyperlipidemia, presents emerged department for weakness.  In addition  daughter states he  had a fall today hit back of head, also notes worsening lower extremity edema.  Daughter states patient has worsening weakness today.    Assessment /Plan   New onset Atrial fibrillation s/p RVR   [id number]: 2   - Eliquis ([facility] pharmacy, $0 copay)  Cardiology consult recs appreciated  - TSH 5.04, free t4  - Increase Metoprolol 50mg BID --> 100mg BID   - ECHO EF 28 % with severe MVR   - trop x 1-ve   - Daughter agrees with anticoagulation, risks were discussed at length previously regarding complications with bleeding especially with hx of falls, daughter agrees with anticoagulation and verbalized understanding of the risks.      Severe MVR with fluid overload acute systolic CHF   - stop iv fluids   - lasix 40 mg IV x1 , change to lasix 20 mg po daily   - daily weights   - cont metoprolol   - ischemic eval timing per cardiology , may need mitraclip via catheter, ? if candidate.     Dementia with weakness with dysphagia  - SLP swallow eval failed, family okay with comfort foods, puree with TL.  aspiration precautions   -CT head unremarkable   - PT eval/treat, JULI , seen by physiatry   - B12 351, TSH (low), RPR (-), Ammonia 21  - UA not c/w UTI   - cont Namenda   fall risk protocol   - Consult- neurology recs seroquel 25 mg daily 50 mg HS     BPH   - on rapaflo and  finasteride, which may result in syncope  - monitor     Glaucoma   - cont home eye drops     DVT/GI  px  - eliquis/ on a diet     DNR/DNI per daughter, health care proxy     DISPO: STR/JULI , Barriers, medically stable.    MEDICATIONS  (STANDING):  apixaban 5 milliGRAM(s) Oral two times a day  finasteride 5 milliGRAM(s) Oral daily  furosemide    Tablet 20 milliGRAM(s) Oral daily  latanoprost 0.005% Ophthalmic Solution 1 Drop(s) Both EYES at bedtime  memantine 10 milliGRAM(s) Oral two times a day  metoprolol tartrate 100 milliGRAM(s) Oral two times a day  mirabegron ER 50 milliGRAM(s) Oral daily  QUEtiapine 25 milliGRAM(s) Oral daily  senna 2 Tablet(s) Oral at bedtime  silodosin 8 milliGRAM(s) Oral daily    MEDICATIONS  (PRN):  acetaminophen     Tablet .. 650 milliGRAM(s) Oral every 6 hours PRN Temp greater or equal to 38C (100.4F), Mild Pain (1 - 3)  aluminum hydroxide/magnesium hydroxide/simethicone Suspension 30 milliLiter(s) Oral every 4 hours PRN Dyspepsia  bisacodyl Suppository 10 milliGRAM(s) Rectal daily PRN Constipation  melatonin 3 milliGRAM(s) Oral at bedtime PRN Insomnia  ondansetron Injectable 4 milliGRAM(s) IV Push every 8 hours PRN Nausea and/or Vomiting  polyethylene glycol 3350 17 Gram(s) Oral daily PRN Constipation      87-year-old male accompanied by his daughter c/o poor po intake, low urine output and leg swelling. Pt with a past medical history of Alzheimer's disease, BPH, hyperlipidemia, presents emerged department for weakness.  In addition  daughter states he  had a fall today hit back of head, also notes worsening lower extremity edema.  Daughter states patient has worsening weakness today.    Assessment /Plan   New onset Atrial fibrillation s/p RVR   [id number]: 4 (left systolic dysfunction, HTN, age)   - Eliquis ([facility] pharmacy, $0 copay)  - Cardiology consult recs appreciated  - TSH 5.04, free t4  - Increased Metoprolol 100mg BID   - ECHO EF 27 % with severe MVR   - trop x 1-ve   - Daughter agrees with anticoagulation, risks were discussed at length previously regarding complications with bleeding especially with hx of falls, daughter agrees with anticoagulation and verbalized understanding of the risks.      Severe MVR with fluid overload acute systolic CHF   - outpatient eval for mitraclip with cardiology   - lasix 40 mg IV x1 , change to lasix 20 mg po daily   - daily weights   - cont metoprolol 100 mg po bid . start low dose entresto 24/26 mg bid   - ischemic eval timing per cardiology , may need mitraclip via catheter, outpatient eval     Dementia with weakness with dysphagia  - SLP swallow eval failed, family okay with comfort foods, puree with TL.  aspiration precautions   -CT head unremarkable   - PT eval/treat, JULI , seen by physiatry   - B12 351, TSH (low), RPR (-), Ammonia 21  - UA not c/w UTI   - cont Namenda   fall risk protocol   - Consult- neurology recs seroquel 25 mg daily 50 mg HS, decreased to 25 mg bid.      BPH   - on rapaflo and  finasteride,   - monitor     Glaucoma   - cont home eye drops     DVT/GI  px  - eliquis/ on a diet     DNR/DNI per daughter, health care proxy     DISPO: STR/JULI , Barriers, medically stable.

## 2023-01-07 NOTE — PROGRESS NOTE ADULT - SUBJECTIVE AND OBJECTIVE BOX
GORDON MERINO87y    Subjective/Interval History     ROS    PHYSICAL EXAM  Vital Signs Last 24 Hrs  T(C): 36.2 (06 Jan 2023 21:30), Max: 36.5 (06 Jan 2023 20:51)  T(F): 97.1 (06 Jan 2023 21:30), Max: 97.7 (06 Jan 2023 20:51)  HR: 91 (07 Jan 2023 04:40) (91 - 112)  BP: 102/75 (07 Jan 2023 04:40) (102/75 - 107/68)  BP(mean): --  RR: 18 (06 Jan 2023 21:30) (18 - 18)  SpO2: 97% (06 Jan 2023 21:30) (97% - 98%)    Parameters below as of 06 Jan 2023 21:30  Patient On (Oxygen Delivery Method): room air      GA : AAOX3, NAD   HEENT: PERRLA, EOMI  NECK: no JVD, no thyromegaly   CVS: S1 S2 no murmur no rubs no gallop  RESP: CTAB no wheeze, no rhonchi no rales  ABD: Soft, NT, ND, tympanic, no rebound or guarding   : No Melgar, No CVA tenderness   EXT; no pedal edema, no cyanosis  MSK: No ML spinal tenderness, normal ROM   NEURO: AAOX3, no new focal deficits     LABS/ IMAGING                        13.2   8.88  )-----------( 186      ( 07 Jan 2023 06:54 )             40.4         01-07    144  |  106  |  37<H>  ----------------------------<  123<H>  3.6   |  26  |  1.1    Ca    8.5      07 Jan 2023 06:54      CAPILLARY BLOOD GLUCOSE                   GORDON Bishop    Subjective/Interval History   - pt ate by himself     ROS  - unable to obtain demented     PHYSICAL EXAM  Vital Signs Last 24 Hrs  T(C): 36.2 (06 Jan 2023 21:30), Max: 36.5 (06 Jan 2023 20:51)  T(F): 97.1 (06 Jan 2023 21:30), Max: 97.7 (06 Jan 2023 20:51)  HR: 91 (07 Jan 2023 04:40) (91 - 112)  BP: 102/75 (07 Jan 2023 04:40) (102/75 - 107/68)  BP(mean): --  RR: 18 (06 Jan 2023 21:30) (18 - 18)  SpO2: 97% (06 Jan 2023 21:30) (97% - 98%)    Parameters below as of 06 Jan 2023 21:30  Patient On (Oxygen Delivery Method): room air      GA : AAOX1, pleasantly , NAD   HEENT: PERRL  NECK: no JVD, no thyromegaly   CVS: S1 S2 no murmur no rubs no gallop, irregularly irregular   RESP: CTAB no wheeze, no rhonchi no rales  ABD: Soft, NT, ND, tympanic, no rebound or guarding   : No Melgar, No CVA tenderness   EXT; trace b/l  pedal edema, no cyanosis  NEURO: AAOX1, pleasantly demented , MS 3+/5 all extremities     LABS/ IMAGING                        13.2   8.88  )-----------( 186      ( 07 Jan 2023 06:54 )             40.4         01-07    144  |  106  |  37<H>  ----------------------------<  123<H>  3.6   |  26  |  1.1    Ca    8.5      07 Jan 2023 06:54      CAPILLARY BLOOD GLUCOSE

## 2023-01-08 LAB
ANION GAP SERPL CALC-SCNC: 12 MMOL/L — SIGNIFICANT CHANGE UP (ref 7–14)
BASOPHILS # BLD AUTO: 0.01 K/UL — SIGNIFICANT CHANGE UP (ref 0–0.2)
BASOPHILS NFR BLD AUTO: 0.1 % — SIGNIFICANT CHANGE UP (ref 0–1)
BUN SERPL-MCNC: 33 MG/DL — HIGH (ref 10–20)
CALCIUM SERPL-MCNC: 8.4 MG/DL — SIGNIFICANT CHANGE UP (ref 8.4–10.5)
CHLORIDE SERPL-SCNC: 104 MMOL/L — SIGNIFICANT CHANGE UP (ref 98–110)
CO2 SERPL-SCNC: 24 MMOL/L — SIGNIFICANT CHANGE UP (ref 17–32)
CREAT SERPL-MCNC: 1.1 MG/DL — SIGNIFICANT CHANGE UP (ref 0.7–1.5)
EGFR: 65 ML/MIN/1.73M2 — SIGNIFICANT CHANGE UP
EOSINOPHIL # BLD AUTO: 0.38 K/UL — SIGNIFICANT CHANGE UP (ref 0–0.7)
EOSINOPHIL NFR BLD AUTO: 4.4 % — SIGNIFICANT CHANGE UP (ref 0–8)
GLUCOSE SERPL-MCNC: 139 MG/DL — HIGH (ref 70–99)
HCT VFR BLD CALC: 39.7 % — LOW (ref 42–52)
HGB BLD-MCNC: 12.5 G/DL — LOW (ref 14–18)
IMM GRANULOCYTES NFR BLD AUTO: 0.2 % — SIGNIFICANT CHANGE UP (ref 0.1–0.3)
LYMPHOCYTES # BLD AUTO: 1.17 K/UL — LOW (ref 1.2–3.4)
LYMPHOCYTES # BLD AUTO: 13.6 % — LOW (ref 20.5–51.1)
MCHC RBC-ENTMCNC: 28 PG — SIGNIFICANT CHANGE UP (ref 27–31)
MCHC RBC-ENTMCNC: 31.5 G/DL — LOW (ref 32–37)
MCV RBC AUTO: 89 FL — SIGNIFICANT CHANGE UP (ref 80–94)
MONOCYTES # BLD AUTO: 0.71 K/UL — HIGH (ref 0.1–0.6)
MONOCYTES NFR BLD AUTO: 8.2 % — SIGNIFICANT CHANGE UP (ref 1.7–9.3)
NEUTROPHILS # BLD AUTO: 6.32 K/UL — SIGNIFICANT CHANGE UP (ref 1.4–6.5)
NEUTROPHILS NFR BLD AUTO: 73.5 % — SIGNIFICANT CHANGE UP (ref 42.2–75.2)
NRBC # BLD: 0 /100 WBCS — SIGNIFICANT CHANGE UP (ref 0–0)
NT-PROBNP SERPL-SCNC: HIGH PG/ML (ref 0–300)
PLATELET # BLD AUTO: 178 K/UL — SIGNIFICANT CHANGE UP (ref 130–400)
POTASSIUM SERPL-MCNC: 3.5 MMOL/L — SIGNIFICANT CHANGE UP (ref 3.5–5)
POTASSIUM SERPL-SCNC: 3.5 MMOL/L — SIGNIFICANT CHANGE UP (ref 3.5–5)
RBC # BLD: 4.46 M/UL — LOW (ref 4.7–6.1)
RBC # FLD: 14.5 % — SIGNIFICANT CHANGE UP (ref 11.5–14.5)
SODIUM SERPL-SCNC: 140 MMOL/L — SIGNIFICANT CHANGE UP (ref 135–146)
WBC # BLD: 8.61 K/UL — SIGNIFICANT CHANGE UP (ref 4.8–10.8)
WBC # FLD AUTO: 8.61 K/UL — SIGNIFICANT CHANGE UP (ref 4.8–10.8)

## 2023-01-08 PROCEDURE — 99232 SBSQ HOSP IP/OBS MODERATE 35: CPT

## 2023-01-08 RX ORDER — METOPROLOL TARTRATE 50 MG
75 TABLET ORAL
Refills: 0 | Status: DISCONTINUED | OUTPATIENT
Start: 2023-01-08 | End: 2023-01-09

## 2023-01-08 RX ADMIN — APIXABAN 5 MILLIGRAM(S): 2.5 TABLET, FILM COATED ORAL at 05:53

## 2023-01-08 RX ADMIN — LATANOPROST 1 DROP(S): 0.05 SOLUTION/ DROPS OPHTHALMIC; TOPICAL at 22:10

## 2023-01-08 RX ADMIN — Medication 20 MILLIGRAM(S): at 05:53

## 2023-01-08 RX ADMIN — SACUBITRIL AND VALSARTAN 1 TABLET(S): 24; 26 TABLET, FILM COATED ORAL at 18:43

## 2023-01-08 RX ADMIN — MIRABEGRON 50 MILLIGRAM(S): 50 TABLET, EXTENDED RELEASE ORAL at 12:48

## 2023-01-08 RX ADMIN — Medication 100 MILLIGRAM(S): at 05:53

## 2023-01-08 RX ADMIN — QUETIAPINE FUMARATE 25 MILLIGRAM(S): 200 TABLET, FILM COATED ORAL at 12:49

## 2023-01-08 RX ADMIN — SENNA PLUS 2 TABLET(S): 8.6 TABLET ORAL at 23:51

## 2023-01-08 RX ADMIN — SACUBITRIL AND VALSARTAN 1 TABLET(S): 24; 26 TABLET, FILM COATED ORAL at 06:27

## 2023-01-08 RX ADMIN — MEMANTINE HYDROCHLORIDE 10 MILLIGRAM(S): 10 TABLET ORAL at 05:53

## 2023-01-08 RX ADMIN — APIXABAN 5 MILLIGRAM(S): 2.5 TABLET, FILM COATED ORAL at 18:43

## 2023-01-08 RX ADMIN — MEMANTINE HYDROCHLORIDE 10 MILLIGRAM(S): 10 TABLET ORAL at 18:43

## 2023-01-08 RX ADMIN — FINASTERIDE 5 MILLIGRAM(S): 5 TABLET, FILM COATED ORAL at 12:49

## 2023-01-08 RX ADMIN — SILODOSIN 8 MILLIGRAM(S): 4 CAPSULE ORAL at 12:48

## 2023-01-08 RX ADMIN — Medication 75 MILLIGRAM(S): at 20:53

## 2023-01-08 NOTE — PROGRESS NOTE ADULT - SUBJECTIVE AND OBJECTIVE BOX
GORDON [id number]    Subjective/Interval History       ROS    PHYSICAL EXAM  Vital Signs Last 24 Hrs  T(C): 35.8 (08 Jan 2023 05:28), Max: 36.5 (07 Jan 2023 14:23)  T(F): 96.5 (08 Jan 2023 05:28), Max: 97.7 (07 Jan 2023 14:23)  HR: 99 (08 Jan 2023 05:28) (86 - 99)  BP: 125/60 (08 Jan 2023 05:28) (107/79 - 125/60)  BP(mean): --  RR: 18 (08 Jan 2023 05:28) (18 - 18)  SpO2: 99% (08 Jan 2023 05:28) (95% - 99%)      GA : AAOX3, NAD   HEENT: PERRLA, EOMI  NECK: no JVD, no thyromegaly   CVS: S1 S2 no murmur no rubs no gallop  RESP: CTAB no wheeze, no rhonchi no rales  ABD: Soft, NT, ND, tympanic, no rebound or guarding   : No Melgar, No CVA tenderness   EXT; no pedal edema, no cyanosis  MSK: No ML spinal tenderness, normal ROM   NEURO: AAOX3, no new focal deficits     LABS/ IMAGING                        12.5   8.61  )-----------( 178      ( 08 Jan 2023 07:15 )             39.7         01-08    140  |  104  |  33<H>  ----------------------------<  139<H>  3.5   |  24  |  1.1    Ca    8.4      08 Jan 2023 07:15      CAPILLARY BLOOD GLUCOSE                   GORDON Bishop    Subjective/Interval History   - pt lethargic not in acute distress     ROS  -unable to obtain due to dementia.     PHYSICAL EXAM  Vital Signs Last 24 Hrs  T(C): 35.8 (08 Jan 2023 05:28), Max: 36.5 (07 Jan 2023 14:23)  T(F): 96.5 (08 Jan 2023 05:28), Max: 97.7 (07 Jan 2023 14:23)  HR: 99 (08 Jan 2023 05:28) (86 - 99)  BP: 125/60 (08 Jan 2023 05:28) (107/79 - 125/60)  BP(mean): --  RR: 18 (08 Jan 2023 05:28) (18 - 18)  SpO2: 99% (08 Jan 2023 05:28) (95% - 99%)      GA : AAOX3, NAD   HEENT: PERRLA, EOMI  NECK: no JVD, no thyromegaly   CVS: S1 S2, soft systolic murmur no rubs no gallop, irregularly irregular   RESP:   no wheeze, no rhonchi, bibasilar fine  rales  ABD: Soft, NT, ND, tympanic, no rebound or guarding   : No Melgar, No CVA tenderness   EXT; no pedal edema, no cyanosis  MSK: No ML spinal tenderness, normal ROM   NEURO: AAOX3, no new focal deficits     LABS/ IMAGING                        12.5   8.61  )-----------( 178      ( 08 Jan 2023 07:15 )             39.7         01-08    140  |  104  |  33<H>  ----------------------------<  139<H>  3.5   |  24  |  1.1    Ca    8.4      08 Jan 2023 07:15      CAPILLARY BLOOD GLUCOSE

## 2023-01-08 NOTE — PROGRESS NOTE ADULT - PROVIDER SPECIALTY LIST ADULT
Cardiology
Hospitalist
Cardiology
Hospitalist
Cardiology
Hospitalist

## 2023-01-08 NOTE — PROGRESS NOTE ADULT - ASSESSMENT
MEDICATIONS  (STANDING):  apixaban 5 milliGRAM(s) Oral two times a day  finasteride 5 milliGRAM(s) Oral daily  furosemide    Tablet 20 milliGRAM(s) Oral daily  latanoprost 0.005% Ophthalmic Solution 1 Drop(s) Both EYES at bedtime  memantine 10 milliGRAM(s) Oral two times a day  metoprolol tartrate 100 milliGRAM(s) Oral two times a day  mirabegron ER 50 milliGRAM(s) Oral daily  QUEtiapine 25 milliGRAM(s) Oral daily  senna 2 Tablet(s) Oral at bedtime  silodosin 8 milliGRAM(s) Oral daily    MEDICATIONS  (PRN):  acetaminophen     Tablet .. 650 milliGRAM(s) Oral every 6 hours PRN Temp greater or equal to 38C (100.4F), Mild Pain (1 - 3)  aluminum hydroxide/magnesium hydroxide/simethicone Suspension 30 milliLiter(s) Oral every 4 hours PRN Dyspepsia  bisacodyl Suppository 10 milliGRAM(s) Rectal daily PRN Constipation  melatonin 3 milliGRAM(s) Oral at bedtime PRN Insomnia  ondansetron Injectable 4 milliGRAM(s) IV Push every 8 hours PRN Nausea and/or Vomiting  polyethylene glycol 3350 17 Gram(s) Oral daily PRN Constipation      87-year-old male accompanied by his daughter c/o poor po intake, low urine output and leg swelling. Pt with a past medical history of Alzheimer's disease, BPH, hyperlipidemia, presents emerged department for weakness.  In addition  daughter states he  had a fall today hit back of head, also notes worsening lower extremity edema.  Daughter states patient has worsening weakness today.    Assessment /Plan   New onset Atrial fibrillation s/p RVR   [id number]: 4 (left systolic dysfunction, HTN, age)   - Eliquis ([email or web address], $0 copay)  - Cardiology consult recs appreciated  - TSH 5.04, free t4  - Increased Metoprolol 100mg BID   - ECHO EF 27 % with severe MVR   - trop x 1-ve   - Daughter agrees with anticoagulation, risks were discussed at length previously regarding complications with bleeding especially with hx of falls, daughter agrees with anticoagulation and verbalized understanding of the risks.      Severe MVR with fluid overload acute systolic CHF   - outpatient eval for mitraclip with cardiology   - lasix 40 mg IV x1 , change to lasix 20 mg po daily   - daily weights   - cont metoprolol 100 mg po bid . start low dose entresto 24/26 mg bid   - ischemic eval timing per cardiology , may need mitraclip via catheter, outpatient eval     Dementia with weakness with dysphagia  - SLP swallow eval failed, family okay with comfort foods, puree with TL.  aspiration precautions   -CT head unremarkable   - PT eval/treat, JULI , seen by physiatry   - B12 351, TSH (low), RPR (-), Ammonia 21  - UA not c/w UTI   - cont Namenda   fall risk protocol   - Consult- neurology recs seroquel 25 mg daily 50 mg HS, decreased to 25 mg bid.      BPH   - on rapaflo and  finasteride,   - monitor     Glaucoma   - cont home eye drops     DVT/GI  px  - eliquis/ on a diet     DNR/DNI per daughter, health care proxy     DISPO: STR/JULI , Barriers, medically stable.    MEDICATIONS  (STANDING):  apixaban 5 milliGRAM(s) Oral two times a day  finasteride 5 milliGRAM(s) Oral daily  furosemide    Tablet 20 milliGRAM(s) Oral daily  latanoprost 0.005% Ophthalmic Solution 1 Drop(s) Both EYES at bedtime  memantine 10 milliGRAM(s) Oral two times a day  metoprolol tartrate 100 milliGRAM(s) Oral two times a day  mirabegron ER 50 milliGRAM(s) Oral daily  QUEtiapine 25 milliGRAM(s) Oral daily  senna 2 Tablet(s) Oral at bedtime  silodosin 8 milliGRAM(s) Oral daily    MEDICATIONS  (PRN):  acetaminophen     Tablet .. 650 milliGRAM(s) Oral every 6 hours PRN Temp greater or equal to 38C (100.4F), Mild Pain (1 - 3)  aluminum hydroxide/magnesium hydroxide/simethicone Suspension 30 milliLiter(s) Oral every 4 hours PRN Dyspepsia  bisacodyl Suppository 10 milliGRAM(s) Rectal daily PRN Constipation  melatonin 3 milliGRAM(s) Oral at bedtime PRN Insomnia  ondansetron Injectable 4 milliGRAM(s) IV Push every 8 hours PRN Nausea and/or Vomiting  polyethylene glycol 3350 17 Gram(s) Oral daily PRN Constipation      87-year-old male accompanied by his daughter c/o poor po intake, low urine output and leg swelling. Pt with a past medical history of Alzheimer's disease, BPH, hyperlipidemia, presents emerged department for weakness.  In addition  daughter states he  had a fall today hit back of head, also notes worsening lower extremity edema.  Daughter states patient has worsening weakness today.    Assessment /Plan   New onset Atrial fibrillation s/p RVR   [id number]: 4 (left systolic dysfunction, HTN, age)   - Eliquis ([facility] pharmacy, $0 copay)  - Cardiology consult recs appreciated  - TSH 5.04, free t4  - cont metoprolol 75 mg bid.   - ECHO EF 27 % with severe MVR   - trop x 1-ve   - Daughter agrees with anticoagulation, risks were discussed at length previously regarding complications with bleeding especially with hx of falls, daughter agrees with anticoagulation and verbalized understanding of the risks.      Severe MVR with acute systolic CHF   - outpatient eval for mitraclip with cardiology   - lasix 40 mg IV x1 , change to lasix 20 mg po daily   - daily weights   - cont metoprolol 75 mg po bid . cont low dose entresto 24/26 mg bid   - ischemic eval timing per cardiology , may need mitraclip via catheter, outpatient eval     Dementia with weakness with dysphagia  - SLP swallow eval failed, family okay with comfort foods, puree with TL.  aspiration precautions   -CT head unremarkable   - PT eval/treat, JULI , seen by physiatry   - B12 351, TSH (low), RPR (-), Ammonia 21  - UA not c/w UTI   - cont Namenda   fall risk protocol   - Consult- neurology recs seroquel 25 mg daily 50 mg HS, decreased to 25 mg bid.      BPH   - on rapaflo and  finasteride,   - monitor     Glaucoma   - cont home eye drops     DVT/GI  px  - eliquis/ on a diet     DNR/DNI per daughter, health care proxy     DISPO: STR/JULI ,  medically stable.

## 2023-01-08 NOTE — PROGRESS NOTE ADULT - REASON FOR ADMISSION
new afib/ r/oCHF

## 2023-01-09 ENCOUNTER — TRANSCRIPTION ENCOUNTER (OUTPATIENT)
Age: 88
End: 2023-01-09

## 2023-01-09 VITALS
RESPIRATION RATE: 18 BRPM | DIASTOLIC BLOOD PRESSURE: 63 MMHG | HEART RATE: 72 BPM | OXYGEN SATURATION: 92 % | SYSTOLIC BLOOD PRESSURE: 110 MMHG | TEMPERATURE: 96 F

## 2023-01-09 LAB
ANION GAP SERPL CALC-SCNC: 11 MMOL/L — SIGNIFICANT CHANGE UP (ref 7–14)
BASOPHILS # BLD AUTO: 0.01 K/UL — SIGNIFICANT CHANGE UP (ref 0–0.2)
BASOPHILS NFR BLD AUTO: 0.1 % — SIGNIFICANT CHANGE UP (ref 0–1)
BUN SERPL-MCNC: 27 MG/DL — HIGH (ref 10–20)
CALCIUM SERPL-MCNC: 8.2 MG/DL — LOW (ref 8.4–10.5)
CHLORIDE SERPL-SCNC: 106 MMOL/L — SIGNIFICANT CHANGE UP (ref 98–110)
CO2 SERPL-SCNC: 25 MMOL/L — SIGNIFICANT CHANGE UP (ref 17–32)
CREAT SERPL-MCNC: 0.9 MG/DL — SIGNIFICANT CHANGE UP (ref 0.7–1.5)
EGFR: 83 ML/MIN/1.73M2 — SIGNIFICANT CHANGE UP
EOSINOPHIL # BLD AUTO: 0.52 K/UL — SIGNIFICANT CHANGE UP (ref 0–0.7)
EOSINOPHIL NFR BLD AUTO: 7.6 % — SIGNIFICANT CHANGE UP (ref 0–8)
GLUCOSE SERPL-MCNC: 90 MG/DL — SIGNIFICANT CHANGE UP (ref 70–99)
HCT VFR BLD CALC: 40.1 % — LOW (ref 42–52)
HGB BLD-MCNC: 13.1 G/DL — LOW (ref 14–18)
IMM GRANULOCYTES NFR BLD AUTO: 0.4 % — HIGH (ref 0.1–0.3)
LYMPHOCYTES # BLD AUTO: 0.9 K/UL — LOW (ref 1.2–3.4)
LYMPHOCYTES # BLD AUTO: 13.1 % — LOW (ref 20.5–51.1)
MCHC RBC-ENTMCNC: 28.9 PG — SIGNIFICANT CHANGE UP (ref 27–31)
MCHC RBC-ENTMCNC: 32.7 G/DL — SIGNIFICANT CHANGE UP (ref 32–37)
MCV RBC AUTO: 88.3 FL — SIGNIFICANT CHANGE UP (ref 80–94)
MONOCYTES # BLD AUTO: 0.61 K/UL — HIGH (ref 0.1–0.6)
MONOCYTES NFR BLD AUTO: 8.9 % — SIGNIFICANT CHANGE UP (ref 1.7–9.3)
NEUTROPHILS # BLD AUTO: 4.8 K/UL — SIGNIFICANT CHANGE UP (ref 1.4–6.5)
NEUTROPHILS NFR BLD AUTO: 69.9 % — SIGNIFICANT CHANGE UP (ref 42.2–75.2)
NRBC # BLD: 0 /100 WBCS — SIGNIFICANT CHANGE UP (ref 0–0)
PLATELET # BLD AUTO: 189 K/UL — SIGNIFICANT CHANGE UP (ref 130–400)
POTASSIUM SERPL-MCNC: 3.7 MMOL/L — SIGNIFICANT CHANGE UP (ref 3.5–5)
POTASSIUM SERPL-SCNC: 3.7 MMOL/L — SIGNIFICANT CHANGE UP (ref 3.5–5)
RBC # BLD: 4.54 M/UL — LOW (ref 4.7–6.1)
RBC # FLD: 14.5 % — SIGNIFICANT CHANGE UP (ref 11.5–14.5)
SARS-COV-2 RNA SPEC QL NAA+PROBE: SIGNIFICANT CHANGE UP
SODIUM SERPL-SCNC: 142 MMOL/L — SIGNIFICANT CHANGE UP (ref 135–146)
WBC # BLD: 6.87 K/UL — SIGNIFICANT CHANGE UP (ref 4.8–10.8)
WBC # FLD AUTO: 6.87 K/UL — SIGNIFICANT CHANGE UP (ref 4.8–10.8)

## 2023-01-09 PROCEDURE — 99239 HOSP IP/OBS DSCHRG MGMT >30: CPT

## 2023-01-09 RX ORDER — TAMSULOSIN HYDROCHLORIDE 0.4 MG/1
1 CAPSULE ORAL
Qty: 0 | Refills: 0 | DISCHARGE

## 2023-01-09 RX ORDER — SACUBITRIL AND VALSARTAN 24; 26 MG/1; MG/1
1 TABLET, FILM COATED ORAL
Qty: 0 | Refills: 0 | DISCHARGE
Start: 2023-01-09

## 2023-01-09 RX ORDER — FUROSEMIDE 40 MG
1 TABLET ORAL
Qty: 0 | Refills: 0 | DISCHARGE
Start: 2023-01-09

## 2023-01-09 RX ORDER — QUETIAPINE FUMARATE 200 MG/1
1 TABLET, FILM COATED ORAL
Qty: 0 | Refills: 0 | DISCHARGE
Start: 2023-01-09

## 2023-01-09 RX ORDER — METOPROLOL TARTRATE 50 MG
1 TABLET ORAL
Qty: 0 | Refills: 0 | DISCHARGE
Start: 2023-01-09

## 2023-01-09 RX ORDER — METOPROLOL TARTRATE 50 MG
50 TABLET ORAL
Refills: 0 | Status: DISCONTINUED | OUTPATIENT
Start: 2023-01-09 | End: 2023-01-09

## 2023-01-09 RX ORDER — QUETIAPINE FUMARATE 200 MG/1
1 TABLET, FILM COATED ORAL
Qty: 0 | Refills: 0 | DISCHARGE

## 2023-01-09 RX ADMIN — APIXABAN 5 MILLIGRAM(S): 2.5 TABLET, FILM COATED ORAL at 18:44

## 2023-01-09 RX ADMIN — Medication 75 MILLIGRAM(S): at 05:30

## 2023-01-09 RX ADMIN — APIXABAN 5 MILLIGRAM(S): 2.5 TABLET, FILM COATED ORAL at 05:29

## 2023-01-09 RX ADMIN — MEMANTINE HYDROCHLORIDE 10 MILLIGRAM(S): 10 TABLET ORAL at 05:29

## 2023-01-09 RX ADMIN — Medication 20 MILLIGRAM(S): at 05:29

## 2023-01-09 RX ADMIN — MIRABEGRON 50 MILLIGRAM(S): 50 TABLET, EXTENDED RELEASE ORAL at 12:05

## 2023-01-09 RX ADMIN — SILODOSIN 8 MILLIGRAM(S): 4 CAPSULE ORAL at 12:05

## 2023-01-09 RX ADMIN — SACUBITRIL AND VALSARTAN 1 TABLET(S): 24; 26 TABLET, FILM COATED ORAL at 18:45

## 2023-01-09 RX ADMIN — SACUBITRIL AND VALSARTAN 1 TABLET(S): 24; 26 TABLET, FILM COATED ORAL at 05:29

## 2023-01-09 RX ADMIN — MEMANTINE HYDROCHLORIDE 10 MILLIGRAM(S): 10 TABLET ORAL at 18:44

## 2023-01-09 RX ADMIN — FINASTERIDE 5 MILLIGRAM(S): 5 TABLET, FILM COATED ORAL at 12:06

## 2023-01-09 NOTE — DISCHARGE NOTE NURSING/CASE MANAGEMENT/SOCIAL WORK - PATIENT PORTAL LINK FT
You can access the FollowMyHealth Patient Portal offered by [facility] by registering at the following website: [email or web address]. By joining [employer]’s FollowMyHealth portal, you will also be able to view your health information using other applications (apps) compatible with our system.

## 2023-01-09 NOTE — DISCHARGE NOTE NURSING/CASE MANAGEMENT/SOCIAL WORK - NSDCPEFALRISK_GEN_ALL_CORE
For information on Fall & Injury Prevention, visit: [email or web address] OR  [email or web address] OR  https://www.cdc.gov/steadi/patient.html

## 2023-01-12 DIAGNOSIS — N40.0 BENIGN PROSTATIC HYPERPLASIA WITHOUT LOWER URINARY TRACT SYMPTOMS: ICD-10-CM

## 2023-01-12 DIAGNOSIS — R13.10 DYSPHAGIA, UNSPECIFIED: ICD-10-CM

## 2023-01-12 DIAGNOSIS — R53.1 WEAKNESS: ICD-10-CM

## 2023-01-12 DIAGNOSIS — I50.21 ACUTE SYSTOLIC (CONGESTIVE) HEART FAILURE: ICD-10-CM

## 2023-01-12 DIAGNOSIS — H40.9 UNSPECIFIED GLAUCOMA: ICD-10-CM

## 2023-01-12 DIAGNOSIS — E86.0 DEHYDRATION: ICD-10-CM

## 2023-01-12 DIAGNOSIS — Z66 DO NOT RESUSCITATE: ICD-10-CM

## 2023-01-12 DIAGNOSIS — E78.5 HYPERLIPIDEMIA, UNSPECIFIED: ICD-10-CM

## 2023-01-12 DIAGNOSIS — Z79.899 OTHER LONG TERM (CURRENT) DRUG THERAPY: ICD-10-CM

## 2023-01-12 DIAGNOSIS — R29.6 REPEATED FALLS: ICD-10-CM

## 2023-01-12 DIAGNOSIS — I48.91 UNSPECIFIED ATRIAL FIBRILLATION: ICD-10-CM

## 2023-01-12 DIAGNOSIS — G30.9 ALZHEIMER'S DISEASE, UNSPECIFIED: ICD-10-CM

## 2023-01-12 DIAGNOSIS — F02.80 DEMENTIA IN OTHER DISEASES CLASSIFIED ELSEWHERE, UNSPECIFIED SEVERITY, WITHOUT BEHAVIORAL DISTURBANCE, PSYCHOTIC DISTURBANCE, MOOD DISTURBANCE, AND ANXIETY: ICD-10-CM

## 2023-01-12 DIAGNOSIS — I34.0 NONRHEUMATIC MITRAL (VALVE) INSUFFICIENCY: ICD-10-CM

## 2023-02-14 ENCOUNTER — APPOINTMENT (OUTPATIENT)
Dept: CARDIOLOGY | Facility: CLINIC | Age: 88
End: 2023-02-14

## 2023-02-14 PROBLEM — Z00.00 ENCOUNTER FOR PREVENTIVE HEALTH EXAMINATION: Status: ACTIVE | Noted: 2023-02-14

## 2023-02-15 PROBLEM — E78.5 HYPERLIPIDEMIA, UNSPECIFIED: Chronic | Status: ACTIVE | Noted: 2022-12-28

## 2023-02-15 PROBLEM — G30.9 ALZHEIMER'S DISEASE, UNSPECIFIED: Chronic | Status: ACTIVE | Noted: 2022-12-28

## 2023-02-15 PROBLEM — N40.0 BENIGN PROSTATIC HYPERPLASIA WITHOUT LOWER URINARY TRACT SYMPTOMS: Chronic | Status: ACTIVE | Noted: 2022-12-28

## 2023-02-15 PROBLEM — H40.9 UNSPECIFIED GLAUCOMA: Chronic | Status: ACTIVE | Noted: 2022-12-28

## 2023-02-20 ENCOUNTER — INPATIENT (INPATIENT)
Facility: HOSPITAL | Age: 88
LOS: 7 days | Discharge: NURSING, REHAB OR LONG-TERM CARE | DRG: 913 | End: 2023-02-28
Attending: STUDENT IN AN ORGANIZED HEALTH CARE EDUCATION/TRAINING PROGRAM | Admitting: STUDENT IN AN ORGANIZED HEALTH CARE EDUCATION/TRAINING PROGRAM
Payer: MEDICARE

## 2023-02-20 VITALS
HEIGHT: 70 IN | DIASTOLIC BLOOD PRESSURE: 59 MMHG | SYSTOLIC BLOOD PRESSURE: 114 MMHG | HEART RATE: 86 BPM | TEMPERATURE: 97 F | OXYGEN SATURATION: 100 % | RESPIRATION RATE: 18 BRPM

## 2023-02-20 DIAGNOSIS — R77.8 OTHER SPECIFIED ABNORMALITIES OF PLASMA PROTEINS: ICD-10-CM

## 2023-02-20 DIAGNOSIS — Z98.49 CATARACT EXTRACTION STATUS, UNSPECIFIED EYE: Chronic | ICD-10-CM

## 2023-02-20 DIAGNOSIS — R29.6 REPEATED FALLS: ICD-10-CM

## 2023-02-20 DIAGNOSIS — Z98.890 OTHER SPECIFIED POSTPROCEDURAL STATES: Chronic | ICD-10-CM

## 2023-02-20 PROBLEM — Z66 DNR (DO NOT RESUSCITATE): Status: ACTIVE | Noted: 2023-02-20

## 2023-02-20 PROBLEM — R60.0 LEG EDEMA: Status: ACTIVE | Noted: 2023-02-20

## 2023-02-20 PROBLEM — Z87.438 HISTORY OF BENIGN PROSTATIC HYPERPLASIA: Status: RESOLVED | Noted: 2023-02-20 | Resolved: 2023-02-20

## 2023-02-20 PROBLEM — Z86.79 HISTORY OF ABNORMAL ELECTROCARDIOGRAPHY: Status: RESOLVED | Noted: 2023-02-20 | Resolved: 2023-02-20

## 2023-02-20 PROBLEM — G30.9 ALZHEIMER DISEASE: Status: ACTIVE | Noted: 2023-02-20

## 2023-02-20 PROBLEM — I34.0 MITRAL REGURGITATION: Status: ACTIVE | Noted: 2023-02-20

## 2023-02-20 PROBLEM — I50.20 HFREF (HEART FAILURE WITH REDUCED EJECTION FRACTION): Status: ACTIVE | Noted: 2023-02-20

## 2023-02-20 PROBLEM — Z86.69 HISTORY OF GLAUCOMA: Status: RESOLVED | Noted: 2023-02-20 | Resolved: 2023-02-20

## 2023-02-20 PROBLEM — I48.91 ATRIAL FIBRILLATION: Status: ACTIVE | Noted: 2023-02-20

## 2023-02-20 LAB
ALBUMIN SERPL ELPH-MCNC: 3.6 G/DL — SIGNIFICANT CHANGE UP (ref 3.5–5.2)
ALP SERPL-CCNC: 76 U/L — SIGNIFICANT CHANGE UP (ref 30–115)
ALT FLD-CCNC: 14 U/L — SIGNIFICANT CHANGE UP (ref 0–41)
ANION GAP SERPL CALC-SCNC: 10 MMOL/L — SIGNIFICANT CHANGE UP (ref 7–14)
AST SERPL-CCNC: 19 U/L — SIGNIFICANT CHANGE UP (ref 0–41)
BASOPHILS # BLD AUTO: 0.04 K/UL — SIGNIFICANT CHANGE UP (ref 0–0.2)
BASOPHILS NFR BLD AUTO: 0.4 % — SIGNIFICANT CHANGE UP (ref 0–1)
BILIRUB SERPL-MCNC: 0.7 MG/DL — SIGNIFICANT CHANGE UP (ref 0.2–1.2)
BUN SERPL-MCNC: 26 MG/DL — HIGH (ref 10–20)
CALCIUM SERPL-MCNC: 9.7 MG/DL — SIGNIFICANT CHANGE UP (ref 8.4–10.5)
CHLORIDE SERPL-SCNC: 104 MMOL/L — SIGNIFICANT CHANGE UP (ref 98–110)
CO2 SERPL-SCNC: 25 MMOL/L — SIGNIFICANT CHANGE UP (ref 17–32)
CREAT SERPL-MCNC: 1.3 MG/DL — SIGNIFICANT CHANGE UP (ref 0.7–1.5)
EGFR: 53 ML/MIN/1.73M2 — LOW
EOSINOPHIL # BLD AUTO: 0.28 K/UL — SIGNIFICANT CHANGE UP (ref 0–0.7)
EOSINOPHIL NFR BLD AUTO: 3.1 % — SIGNIFICANT CHANGE UP (ref 0–8)
ETHANOL SERPL-MCNC: <10 MG/DL — SIGNIFICANT CHANGE UP
GLUCOSE SERPL-MCNC: 117 MG/DL — HIGH (ref 70–99)
HCT VFR BLD CALC: 44.1 % — SIGNIFICANT CHANGE UP (ref 42–52)
HGB BLD-MCNC: 14.1 G/DL — SIGNIFICANT CHANGE UP (ref 14–18)
IMM GRANULOCYTES NFR BLD AUTO: 0.4 % — HIGH (ref 0.1–0.3)
LACTATE SERPL-SCNC: 1.8 MMOL/L — SIGNIFICANT CHANGE UP (ref 0.7–2)
LYMPHOCYTES # BLD AUTO: 1.11 K/UL — LOW (ref 1.2–3.4)
LYMPHOCYTES # BLD AUTO: 12.3 % — LOW (ref 20.5–51.1)
MCHC RBC-ENTMCNC: 28.5 PG — SIGNIFICANT CHANGE UP (ref 27–31)
MCHC RBC-ENTMCNC: 32 G/DL — SIGNIFICANT CHANGE UP (ref 32–37)
MCV RBC AUTO: 89.3 FL — SIGNIFICANT CHANGE UP (ref 80–94)
MONOCYTES # BLD AUTO: 0.96 K/UL — HIGH (ref 0.1–0.6)
MONOCYTES NFR BLD AUTO: 10.6 % — HIGH (ref 1.7–9.3)
NEUTROPHILS # BLD AUTO: 6.59 K/UL — HIGH (ref 1.4–6.5)
NEUTROPHILS NFR BLD AUTO: 73.2 % — SIGNIFICANT CHANGE UP (ref 42.2–75.2)
NRBC # BLD: 0 /100 WBCS — SIGNIFICANT CHANGE UP (ref 0–0)
PLATELET # BLD AUTO: 183 K/UL — SIGNIFICANT CHANGE UP (ref 130–400)
POTASSIUM SERPL-MCNC: 5.1 MMOL/L — HIGH (ref 3.5–5)
POTASSIUM SERPL-SCNC: 5.1 MMOL/L — HIGH (ref 3.5–5)
PROT SERPL-MCNC: 6.8 G/DL — SIGNIFICANT CHANGE UP (ref 6–8)
RBC # BLD: 4.94 M/UL — SIGNIFICANT CHANGE UP (ref 4.7–6.1)
RBC # FLD: 16.7 % — HIGH (ref 11.5–14.5)
SARS-COV-2 RNA SPEC QL NAA+PROBE: SIGNIFICANT CHANGE UP
SODIUM SERPL-SCNC: 139 MMOL/L — SIGNIFICANT CHANGE UP (ref 135–146)
TROPONIN T SERPL-MCNC: 0.01 NG/ML — SIGNIFICANT CHANGE UP
TROPONIN T SERPL-MCNC: 0.03 NG/ML — CRITICAL HIGH
WBC # BLD: 9.02 K/UL — SIGNIFICANT CHANGE UP (ref 4.8–10.8)
WBC # FLD AUTO: 9.02 K/UL — SIGNIFICANT CHANGE UP (ref 4.8–10.8)

## 2023-02-20 PROCEDURE — U0003: CPT

## 2023-02-20 PROCEDURE — 93306 TTE W/DOPPLER COMPLETE: CPT

## 2023-02-20 PROCEDURE — U0005: CPT

## 2023-02-20 PROCEDURE — 85610 PROTHROMBIN TIME: CPT

## 2023-02-20 PROCEDURE — 80053 COMPREHEN METABOLIC PANEL: CPT

## 2023-02-20 PROCEDURE — 93970 EXTREMITY STUDY: CPT

## 2023-02-20 PROCEDURE — 36415 COLL VENOUS BLD VENIPUNCTURE: CPT

## 2023-02-20 PROCEDURE — 93010 ELECTROCARDIOGRAM REPORT: CPT

## 2023-02-20 PROCEDURE — 86140 C-REACTIVE PROTEIN: CPT

## 2023-02-20 PROCEDURE — 80162 ASSAY OF DIGOXIN TOTAL: CPT

## 2023-02-20 PROCEDURE — 80061 LIPID PANEL: CPT

## 2023-02-20 PROCEDURE — 97162 PT EVAL MOD COMPLEX 30 MIN: CPT | Mod: GP

## 2023-02-20 PROCEDURE — 97530 THERAPEUTIC ACTIVITIES: CPT | Mod: GP

## 2023-02-20 PROCEDURE — 83036 HEMOGLOBIN GLYCOSYLATED A1C: CPT

## 2023-02-20 PROCEDURE — 71045 X-RAY EXAM CHEST 1 VIEW: CPT

## 2023-02-20 PROCEDURE — 74177 CT ABD & PELVIS W/CONTRAST: CPT | Mod: 26,MA

## 2023-02-20 PROCEDURE — 71045 X-RAY EXAM CHEST 1 VIEW: CPT | Mod: 26

## 2023-02-20 PROCEDURE — 97116 GAIT TRAINING THERAPY: CPT | Mod: GP

## 2023-02-20 PROCEDURE — 84484 ASSAY OF TROPONIN QUANT: CPT

## 2023-02-20 PROCEDURE — 85652 RBC SED RATE AUTOMATED: CPT

## 2023-02-20 PROCEDURE — 72170 X-RAY EXAM OF PELVIS: CPT | Mod: 26

## 2023-02-20 PROCEDURE — 87040 BLOOD CULTURE FOR BACTERIA: CPT

## 2023-02-20 PROCEDURE — 99223 1ST HOSP IP/OBS HIGH 75: CPT

## 2023-02-20 PROCEDURE — 83605 ASSAY OF LACTIC ACID: CPT

## 2023-02-20 PROCEDURE — 85730 THROMBOPLASTIN TIME PARTIAL: CPT

## 2023-02-20 PROCEDURE — 72125 CT NECK SPINE W/O DYE: CPT | Mod: 26,MA

## 2023-02-20 PROCEDURE — 85025 COMPLETE CBC W/AUTO DIFF WBC: CPT

## 2023-02-20 PROCEDURE — 71260 CT THORAX DX C+: CPT | Mod: 26,MA

## 2023-02-20 PROCEDURE — 70450 CT HEAD/BRAIN W/O DYE: CPT | Mod: 26,MA

## 2023-02-20 PROCEDURE — 83735 ASSAY OF MAGNESIUM: CPT

## 2023-02-20 PROCEDURE — 97110 THERAPEUTIC EXERCISES: CPT | Mod: GP

## 2023-02-20 RX ORDER — FUROSEMIDE 40 MG
20 TABLET ORAL DAILY
Refills: 0 | Status: DISCONTINUED | OUTPATIENT
Start: 2023-02-20 | End: 2023-02-22

## 2023-02-20 RX ORDER — LATANOPROST 0.05 MG/ML
1 SOLUTION/ DROPS OPHTHALMIC; TOPICAL
Qty: 0 | Refills: 0 | DISCHARGE

## 2023-02-20 RX ORDER — TAMSULOSIN HYDROCHLORIDE 0.4 MG/1
0.4 CAPSULE ORAL AT BEDTIME
Refills: 0 | Status: DISCONTINUED | OUTPATIENT
Start: 2023-02-20 | End: 2023-02-28

## 2023-02-20 RX ORDER — APIXABAN 2.5 MG/1
5 TABLET, FILM COATED ORAL EVERY 12 HOURS
Refills: 0 | Status: DISCONTINUED | OUTPATIENT
Start: 2023-02-20 | End: 2023-02-28

## 2023-02-20 RX ORDER — DOCUSATE SODIUM 100 MG
1 CAPSULE ORAL
Qty: 0 | Refills: 0 | DISCHARGE

## 2023-02-20 RX ORDER — QUETIAPINE FUMARATE 200 MG/1
25 TABLET, FILM COATED ORAL
Refills: 0 | Status: DISCONTINUED | OUTPATIENT
Start: 2023-02-20 | End: 2023-02-28

## 2023-02-20 RX ORDER — SPIRONOLACTONE 25 MG/1
25 TABLET, FILM COATED ORAL DAILY
Refills: 0 | Status: DISCONTINUED | OUTPATIENT
Start: 2023-02-20 | End: 2023-02-23

## 2023-02-20 RX ORDER — SODIUM CHLORIDE 9 MG/ML
1000 INJECTION, SOLUTION INTRAVENOUS ONCE
Refills: 0 | Status: COMPLETED | OUTPATIENT
Start: 2023-02-20 | End: 2023-02-20

## 2023-02-20 RX ORDER — METOPROLOL TARTRATE 50 MG
50 TABLET ORAL
Refills: 0 | Status: DISCONTINUED | OUTPATIENT
Start: 2023-02-20 | End: 2023-02-22

## 2023-02-20 RX ORDER — MEMANTINE HYDROCHLORIDE 10 MG/1
1 TABLET ORAL
Qty: 0 | Refills: 0 | DISCHARGE

## 2023-02-20 RX ORDER — DONEPEZIL HYDROCHLORIDE 10 MG/1
10 TABLET, FILM COATED ORAL AT BEDTIME
Refills: 0 | Status: DISCONTINUED | OUTPATIENT
Start: 2023-02-20 | End: 2023-02-28

## 2023-02-20 RX ORDER — LATANOPROST 0.05 MG/ML
1 SOLUTION/ DROPS OPHTHALMIC; TOPICAL AT BEDTIME
Refills: 0 | Status: DISCONTINUED | OUTPATIENT
Start: 2023-02-20 | End: 2023-02-28

## 2023-02-20 RX ORDER — SACUBITRIL AND VALSARTAN 24; 26 MG/1; MG/1
1 TABLET, FILM COATED ORAL
Refills: 0 | Status: DISCONTINUED | OUTPATIENT
Start: 2023-02-20 | End: 2023-02-23

## 2023-02-20 RX ORDER — POLYETHYLENE GLYCOL 3350 17 G/17G
17 POWDER, FOR SOLUTION ORAL DAILY
Refills: 0 | Status: DISCONTINUED | OUTPATIENT
Start: 2023-02-20 | End: 2023-02-28

## 2023-02-20 RX ORDER — FINASTERIDE 5 MG/1
5 TABLET, FILM COATED ORAL DAILY
Refills: 0 | Status: DISCONTINUED | OUTPATIENT
Start: 2023-02-20 | End: 2023-02-28

## 2023-02-20 RX ORDER — FINASTERIDE 5 MG/1
1 TABLET, FILM COATED ORAL
Qty: 0 | Refills: 0 | DISCHARGE

## 2023-02-20 RX ORDER — SILODOSIN 4 MG/1
1 CAPSULE ORAL
Qty: 0 | Refills: 0 | DISCHARGE

## 2023-02-20 RX ORDER — SENNA PLUS 8.6 MG/1
2 TABLET ORAL AT BEDTIME
Refills: 0 | Status: DISCONTINUED | OUTPATIENT
Start: 2023-02-20 | End: 2023-02-28

## 2023-02-20 RX ORDER — MIRABEGRON 50 MG/1
1 TABLET, EXTENDED RELEASE ORAL
Qty: 0 | Refills: 0 | DISCHARGE

## 2023-02-20 RX ORDER — MEMANTINE HYDROCHLORIDE 10 MG/1
10 TABLET ORAL
Refills: 0 | Status: DISCONTINUED | OUTPATIENT
Start: 2023-02-20 | End: 2023-02-28

## 2023-02-20 RX ORDER — DONEPEZIL HYDROCHLORIDE 10 MG/1
1 TABLET, FILM COATED ORAL
Qty: 0 | Refills: 0 | DISCHARGE

## 2023-02-20 RX ADMIN — APIXABAN 5 MILLIGRAM(S): 2.5 TABLET, FILM COATED ORAL at 18:47

## 2023-02-20 RX ADMIN — SENNA PLUS 2 TABLET(S): 8.6 TABLET ORAL at 23:06

## 2023-02-20 RX ADMIN — Medication 20 MILLIGRAM(S): at 23:06

## 2023-02-20 RX ADMIN — MEMANTINE HYDROCHLORIDE 10 MILLIGRAM(S): 10 TABLET ORAL at 18:47

## 2023-02-20 RX ADMIN — SODIUM CHLORIDE 1000 MILLILITER(S): 9 INJECTION, SOLUTION INTRAVENOUS at 10:43

## 2023-02-20 RX ADMIN — QUETIAPINE FUMARATE 25 MILLIGRAM(S): 200 TABLET, FILM COATED ORAL at 18:47

## 2023-02-20 RX ADMIN — SACUBITRIL AND VALSARTAN 1 TABLET(S): 24; 26 TABLET, FILM COATED ORAL at 18:48

## 2023-02-20 RX ADMIN — LATANOPROST 1 DROP(S): 0.05 SOLUTION/ DROPS OPHTHALMIC; TOPICAL at 23:06

## 2023-02-20 RX ADMIN — DONEPEZIL HYDROCHLORIDE 10 MILLIGRAM(S): 10 TABLET, FILM COATED ORAL at 23:05

## 2023-02-20 RX ADMIN — Medication 50 MILLIGRAM(S): at 18:47

## 2023-02-20 RX ADMIN — TAMSULOSIN HYDROCHLORIDE 0.4 MILLIGRAM(S): 0.4 CAPSULE ORAL at 23:06

## 2023-02-20 RX ADMIN — SPIRONOLACTONE 25 MILLIGRAM(S): 25 TABLET, FILM COATED ORAL at 23:06

## 2023-02-20 RX ADMIN — FINASTERIDE 5 MILLIGRAM(S): 5 TABLET, FILM COATED ORAL at 23:05

## 2023-02-20 NOTE — ED PROVIDER NOTE - CLINICAL SUMMARY MEDICAL DECISION MAKING FREE TEXT BOX
87-year-old male presents to the ED for an unwitnessed fall at the nursing home.  Trauma alert was called as patient was noted to be on anticoagulation with Eliquis in the setting of a fall.  Physical exam is overall unremarkable with no overt injuries noted.  Due to age, poor history, multiple comorbidities decision was made to obtain labs and CT imaging along with chest and pelvis x-ray.  ED Chest X-Ray independent interpretation by me, which did not reveal a pneumothorax, infiltrate, or effusion.  ED Pelvis portable XR independent interpretation by me which did not reveal fx's or dislocations.  Independent interpretation of EKG noted to have A-fib CT imaging unremarkable for traumatic injuries.  No acute intervention from trauma surgical perspective.  Admitted to medicine for fall/syncope.

## 2023-02-20 NOTE — H&P ADULT - NSHPPHYSICALEXAM_GEN_ALL_CORE
GENERAL: NAD, lying in bed comfortably  HEAD:  Atraumatic, Normocephalic  CHEST/LUNG:  No rales, rhonchi, wheezing, or rubs. Unlabored respirations  HEART: irregular rate and rhythm  ABDOMEN: Soft, Nontender, Nondistended  EXTREMITIES:  No clubbing, cyanosis, or edema  NERVOUS SYSTEM:  Alert & Oriented X0-1, awake, alert, follows command

## 2023-02-20 NOTE — CONSULT NOTE ADULT - ASSESSMENT
ASSESSMENT:  87yM w/ PMHx of Alzheimer's, BPH, gastritis, A fib, heart failure, BRAXTON, CKD 4, and HTN seen as Trauma Alert s/p unwitnessed fall at [facility], [place], operated by [facility] ?HT ?LOC +Eliquis.  Trauma assessment in ED: ABCs intact , GCS 14 , AAOx1. Per facility, patient was found down in his room this morning and was complaining of back pain and so was brought to the ED. Last dose of Eliquis, baseline mental status, time down all unknown. Upon arrival to ED, patient is HD stable, afebrile, not complaining of any pain. No external signs of trauma.     Injuries identified:   -   -   -     PLAN:   - Trauma Labs: (CBC, BMP, Coags, T&S, UA, EtOH level)  Additional studies:  EKG  Utox    Trauma Imaging to include the following:  - CXR, Pelvic Xray  - CT Head,  CT C-spine, CT Chest, CT Abd/Pelvis  - Extremity films: None    Additional consultations:      Disposition pending results of above labs and imaging  Above plan discussed with Trauma attending, Dr. Woods, patient, patient family, and ED team  --------------------------------------------------------------------------------------  02-20-23 @ 09:41 ASSESSMENT:  87yM w/ PMHx of Alzheimer's, BPH, gastritis, A fib, heart failure, BRAXTON, CKD 4, and HTN seen as Trauma Alert s/p unwitnessed fall at [facility] ?HT ?LOC +Eliquis.  Trauma assessment in ED: ABCs intact , GCS 14 , AAOx1. Per facility, patient was found down in his room this morning and was complaining of back pain and so was brought to the ED. Last dose of Eliquis, baseline mental status, time down all unknown. Upon arrival to ED, patient is HD stable, afebrile, not complaining of any pain. No external signs of trauma.     Injuries identified:   - no acute traumatic injuries       PLAN:   - Trauma Labs: (CBC, BMP, Coags, T&S, UA, EtOH level)  Additional studies:  EKG  Utox    Trauma Imaging to include the following:  - CXR, Pelvic Xray  - CT Head,  CT C-spine, CT Chest, CT Abd/Pelvis  - Extremity films: None    Additional consultations:      No acute traumatic injuries. No further workup needed from a trauma perspective, Disposition per ED team.   Above plan discussed with Trauma attending, Dr. Woods, patient, patient family, and ED team  --------------------------------------------------------------------------------------  02-20-23 @ 09:41

## 2023-02-20 NOTE — ED PROVIDER NOTE - PHYSICAL EXAMINATION
CONSTITUTIONAL: Well-developed; well-nourished; in no acute distress. gcs 15, speaking in full sentences, moving all extremities  SKIN: warm, dry  HEAD: Normocephalic; atraumatic.  EYES: PERRL, EOMI, no conjunctival erythema  ENT: No nasal discharge; airway clear, mucous membranes moist  CARD: +S1, S2 no murmurs, gallops, or rubs. Regular rate and rhythm. radial 2+ b/l, no chest wall tenderness or crepitus  RESP: No wheezes, rales or rhonchi. CTABL  ABD: soft ntnd, no rebound, no guarding, no rigidity. Pelvis stable  EXT: moves all extremities,  No clubbing, cyanosis or edema.   MSK: nomidline spinal tenderness C/T/L  NEURO: Alert, oriented, grossly unremarkable, cn ii-xii grossly intact, follows commands  PSYCH: Cooperative, appropriate.

## 2023-02-20 NOTE — HISTORY OF PRESENT ILLNESS
[FreeTextEntry1] : 86 y/o w PMH Alzheimers Dse, HLD, glaucoma, BPH brought to ER 12/28/22, Disch 1/9/23 with c/o per daughter progressive  weakness, confusion, frequent falls, increasing leg edema. Pt was found w volume overload , rapid A fib, EF 27% mod severe  Pt diuresed started on Eliquis.He was discharged to NH and is here for f/u.

## 2023-02-20 NOTE — H&P ADULT - HISTORY OF PRESENT ILLNESS
87-year-old male past medical history significant for hypertension, dyslipidemia, CAD, CHF, CKD stage III, BPH, Alzheimer's dementia, anxiety, A-fib on Eliquis, status post fall and SNF.  Fall was unwitnessed and patient was found on the floor.  As per SNF notes, patient was complaining of back pain.  Patient currently with no complaints.  Patient unsure of head trauma or LOC.  No neck pain.  No chest pain or shortness of breath.  No abdominal pain.  Vitals noted. ALERT ORIENTED TO PERSON AND PLACE, NAD GCS-15. NCAT. PERRL, EOMI. NO MIDLINE C SPINE TENDERNESS. LUNGS CLEAR B/L. CHEST NONTENDER, NO CREPITUS. RRR. ABD- SOFT NONTENDER. PELVIS STABLE NONTENDER. BACK NONTENDER. NO SPINE TENDERNESS.      Vital Signs Last 24 Hrs:  T(F): 97.7 (20 Feb 2023 09:44), Max: 97.7 (20 Feb 2023 09:44)  HR: 86 (20 Feb 2023 09:23) (86 - 86)  BP: 114/59 (20 Feb 2023 09:23) (114/59 - 114/59)  RR: 18 (20 Feb 2023 09:23) (18 - 18)  SpO2: 100% (20 Feb 2023 09:23) (100% - 100%) on RA   **Patient has dementia, history obtained form SNF staff**    87-year-old male past medical history significant for hypertension, dyslipidemia, CAD, CHF, CKD stage III, BPH, Alzheimer's dementia, anxiety and A-fib on Eliquis presents for unwitnessed fall. Fall was unwitnessed, patient was found on the floor and started complaining of back pain, for which patient was referred to ED for further work up. Although patient has dementia but he was denying any complaints at time of my interview.    In the ED, imaging showed no acute traumatic events.    Vital Signs Last 24 Hrs:  T(F): 97.7 (20 Feb 2023 09:44), Max: 97.7 (20 Feb 2023 09:44)  HR: 86 (20 Feb 2023 09:23) (86 - 86)  BP: 114/59 (20 Feb 2023 09:23) (114/59 - 114/59)  RR: 18 (20 Feb 2023 09:23) (18 - 18)  SpO2: 100% (20 Feb 2023 09:23) (100% - 100%) on RA   **Patient has dementia, history obtained form SNF staff**    87-year-old male past medical history significant for hypertension, dyslipidemia, CAD, CHF, CKD stage III, BPH, Alzheimer's dementia, anxiety and A-fib on Eliquis presents for unwitnessed fall. Fall was unwitnessed, patient was found on the floor and started complaining of back pain, for which patient was referred to ED for further work up. Although patient has dementia but he was denying any complaints at time of my interview. SNF staff endorsed that most likely patient was trying to get out of bed and then fell and was found on his back.    In the ED, imaging showed no acute traumatic events.    Vital Signs Last 24 Hrs:  T(F): 97.7 (20 Feb 2023 09:44), Max: 97.7 (20 Feb 2023 09:44)  HR: 86 (20 Feb 2023 09:23) (86 - 86)  BP: 114/59 (20 Feb 2023 09:23) (114/59 - 114/59)  RR: 18 (20 Feb 2023 09:23) (18 - 18)  SpO2: 100% (20 Feb 2023 09:23) (100% - 100%) on RA

## 2023-02-20 NOTE — H&P ADULT - ATTENDING COMMENTS
Patient seen and examined at bedside independently of the residents. I read the resident's note and agree with the plan with the additions and corrections as noted below. My note supersedes the resident's note.     REVIEW OF SYSTEMS:  Negative except in HPI.     PMH: HTN, HLD, CAD, CHF, CKD stage 3, BPH, Alzheimer's dementia, A.fib (on eliquis), Anxiety     FHx: Reviewed. No fhx of asthma/copd, No fhx of liver and pulmonary disease. No fhx of hematological disorder.     Physical Exam:  GEN: No acute distress. Awake, Alert.   Head: Atraumatic, Normocephalic.   Eye: PEERLA. No sclera icterus. EOMI.   ENT: Normal oropharynx, no thyromegaly, no mass, no lymphadenopathy.   LUNGS: Clear to auscultation bilaterally. No wheeze/rales/crackles.   HEART: Normal. S1/S2 present. RRR. No murmur/gallops.   ABD: Soft, non-tender, non-distended. Bowel sounds present.   EXT: No pitting edema. No erythema. No tenderness.  Integumentary: No rash, No sore, No petechia.   NEURO: CN III-XII intact. Moving all extremities.      Vital Signs Last 24 Hrs  T(C): 36.2 (2023 15:34), Max: 36.5 (2023 09:44)  T(F): 97.1 (2023 15:34), Max: 97.7 (2023 09:44)  HR: 88 (2023 15:34) (86 - 88)  BP: 96/69 (2023 15:34) (96/69 - 114/59)  BP(mean): --  RR: 18 (2023 15:34) (18 - 18)  SpO2: 97% (2023 15:34) (97% - 100%)    Parameters below as of 2023 15:34  Patient On (Oxygen Delivery Method): room air      Please see the above notes for Labs and radiology.     Assessment and Plan:     88 yo M with hx of HTN, HLD, CAD, CHF, CKD stage 3, BPH, Alzheimer's dementia, A.fib (on eliquis), Anxiety presents to ED from NH for unwitnessed fall.     Fall  - Ddx: mechanical fall vs. syncope   - Trauma w/u neg for acute traumatic process.   - EKG shows A.fib. No significant ST/ T wave changes from prior.   - troponin 0.03. Trend Troponin.   - check 2d Echo  - check orthostatic VS   - monitor on telemetry.     Chronic A.fib   - on lopressor and eliquis    HFrEF - not in exacerbation. c/w home med  CKD stage 3 - serum Cr stable at baseline.   BPH - on flomax and finasteride.   Glaucoma - on home eye drops     DVT ppx: eliquis  GI ppx:  not indicated.   Diet: Pureed diet   Activity: as tolerated.     Date seen by the attendin2023  Total time spent: 75 minutes. Patient seen and examined at bedside independently of the residents. I read the resident's note and agree with the plan with the additions and corrections as noted below. My note supersedes the resident's note.     REVIEW OF SYSTEMS:  Negative except in HPI.     PMH: HTN, HLD, CAD, CHF, CKD stage 3, BPH, Alzheimer's dementia, A.fib (on eliquis), Anxiety     FHx: Reviewed. No fhx of asthma/copd, No fhx of liver and pulmonary disease. No fhx of hematological disorder.     Physical Exam:  GEN: No acute distress. Awake, Alert and oriented x 1.   Head: Atraumatic, Normocephalic.   Eye: PEERLA. No sclera icterus. EOMI.   ENT: Normal oropharynx, no thyromegaly, no mass, no lymphadenopathy.   LUNGS: Clear to auscultation bilaterally. No wheeze/rales/crackles.   HEART: Normal. S1/S2 present. RRR. No murmur/gallops.   ABD: Soft, non-tender, non-distended. Bowel sounds present.   EXT: No pitting edema. No erythema. No tenderness.  Integumentary: No rash, No sore, No petechia.   NEURO: CN III-XII intact. Moving all extremities.      Vital Signs Last 24 Hrs  T(C): 36.2 (2023 15:34), Max: 36.5 (2023 09:44)  T(F): 97.1 (2023 15:34), Max: 97.7 (2023 09:44)  HR: 88 (2023 15:34) (86 - 88)  BP: 96/69 (2023 15:34) (96/69 - 114/59)  BP(mean): --  RR: 18 (2023 15:34) (18 - 18)  SpO2: 97% (2023 15:34) (97% - 100%)    Parameters below as of 2023 15:34  Patient On (Oxygen Delivery Method): room air      Please see the above notes for Labs and radiology.     Assessment and Plan:     86 yo M with hx of HTN, HLD, CAD, CHF, CKD stage 3, BPH, Alzheimer's dementia, A.fib (on eliquis), Anxiety presents to ED from NH for unwitnessed fall. Patient denied any chest pain.     Fall  - Ddx: mechanical fall vs. syncope   - Trauma w/u neg for acute traumatic process.   - EKG shows A.fib. No significant ST/ T wave changes from prior.   - troponin 0.03. Trend Troponin.   - check 2d Echo  - check orthostatic VS   - monitor on telemetry.     Chronic A.fib   - on lopressor and eliquis    HFrEF - not in exacerbation. c/w home med  CKD stage 3 - serum Cr stable at baseline.   BPH - on flomax and finasteride.   Glaucoma - on home eye drops     DVT ppx: eliquis  GI ppx:  not indicated.   Diet: Pureed diet   Activity: as tolerated.     Date seen by the attendin2023  Total time spent: 75 minutes.

## 2023-02-20 NOTE — CONSULT NOTE ADULT - SUBJECTIVE AND OBJECTIVE BOX
TRAUMA ACTIVATION LEVEL:  ALERT  ACTIVATED BY: ED  INTUBATED: NO      MECHANISM OF INJURY:   [] Blunt     [] MVC	  [x] Fall	  [] Pedestrian Struck	  [] Motorcycle     [] Assault     [] Bicycle collision    [] Sports injury    [] Penetrating    [] Gun Shot Wound      [] Stab Wound    GCS: 14 	E: 4	V: 4	M: 6    HPI:    87yM w/ PMHx of Alzheimer's, BPH, gastritis, A fib, heart failure, BRAXTON, CKD 4, and HTN seen as Trauma Alert s/p unwitnessed fall at [facility] ?HT ?LOC +Eliquis.  Trauma assessment in ED: ABCs intact , GCS 14 , AAOx1. Per facility, patient was found down in his room this morning and was complaining of back pain and so was brought to the ED. Last dose of Eliquis, baseline mental status, time down all unknown. Upon arrival to ED, patient is HD stable, afebrile, not complaining of any pain. No external signs of trauma.     PAST MEDICAL & SURGICAL HISTORY:  Alzheimer disease      Glaucoma      Enlarged prostate      Hyperlipidemia      History of hernia repair      History of cataract surgery          Allergies    No Known Allergies    Intolerances        Home Medications:  finasteride 5 mg oral tablet: 1 tab(s) orally once a day (29 Dec 2022 13:15)  furosemide 20 mg oral tablet: 1 tab(s) orally once a day (09 Jan 2023 10:42)  latanoprost 0.005% ophthalmic solution: 1 drop(s) to each affected eye once a day (in the evening) (29 Dec 2022 13:15)  memantine 10 mg oral tablet: 1 tab(s) orally 2 times a day (29 Dec 2022 13:15)  metoprolol tartrate 50 mg oral tablet: 1 tab(s) orally 2 times a day (09 Jan 2023 10:42)  Myrbetriq 50 mg oral tablet, extended release: 1 tab(s) orally once a day (29 Dec 2022 13:15)  QUEtiapine 25 mg oral tablet: 1 tab(s) orally 2 times a day (09 Jan 2023 17:00)  sacubitril-valsartan 24 mg-26 mg oral tablet: 1 tab(s) orally 2 times a day (09 Jan 2023 10:42)  silodosin 8 mg oral capsule: 1 cap(s) orally once a day (29 Dec 2022 13:15)      ROS: 10-system review is otherwise negative except HPI above.      Primary Survey:    A - airway intact  B - bilateral breath sounds and good chest rise  C - palpable pulses in all extremities  D - GCS 15 on arrival, SAL  Exposure obtained    Vital Signs Last 24 Hrs  T(C): 36.1 (20 Feb 2023 09:23), Max: 36.1 (20 Feb 2023 09:23)  T(F): 97 (20 Feb 2023 09:23), Max: 97 (20 Feb 2023 09:23)  HR: 86 (20 Feb 2023 09:23) (86 - 86)  BP: 114/59 (20 Feb 2023 09:23) (114/59 - 114/59)  BP(mean): --  RR: 18 (20 Feb 2023 09:23) (18 - 18)  SpO2: 100% (20 Feb 2023 09:23) (100% - 100%)        Secondary Survey:   General: NAD  HEENT: Normocephalic, atraumatic, EOMI, PEERLA. no scalp lacerations   Neck: Soft, midline trachea. no c-spine tenderness  Chest: No chest wall tenderness, no subcutaneous emphysema   Cardiac: S1, S2, RRR  Respiratory: Bilateral breath sounds, clear and equal bilaterally  Abdomen: Soft, non-distended, non-tender, no rebound, no guarding.  Groin: Normal appearing, pelvis stable   Ext:  Moving b/l upper and lower extremities. Palpable Radial b/l UE, b/l DP palpable in LE.   Back: No T/L/S spine tenderness, No palpable runoff/stepoff/deformity  Rectal: No juliana blood, JAKOB with good tone    FAST:     ACCESS / DEVICES:  [X] Peripheral IV    Labs:  CAPILLARY BLOOD GLUCOSE      POCT Blood Glucose.: 101 mg/dL (20 Feb 2023 09:32)                  LFTs:         Coags:                        RADIOLOGY & ADDITIONAL STUDIES:  ---------------------------------------------------------------------------------------     TRAUMA ACTIVATION LEVEL:  ALERT  ACTIVATED BY: ED  INTUBATED: NO      MECHANISM OF INJURY:   [] Blunt     [] MVC	  [x] Fall	      GCS: 14 	E: 4	V: 4	M: 6    HPI:    87yM w/ PMHx of Alzheimer's, BPH, gastritis, A fib, heart failure, BRAXTON, CKD 4, and HTN seen as Trauma Alert s/p unwitnessed fall at [facility] ?HT ?LOC +Eliquis.  Trauma assessment in ED: ABCs intact , GCS 14 , AAOx1. Per facility, patient was found down in his room this morning and was complaining of back pain and so was brought to the ED. Last dose of Eliquis, baseline mental status, time down all unknown. Upon arrival to ED, patient is HD stable, afebrile, not complaining of any pain. No external signs of trauma.     PAST MEDICAL & SURGICAL HISTORY:  Alzheimer disease  Glaucoma  Enlarged prostate  Hyperlipidemia  History of hernia repair  History of cataract surgery          Allergies  No Known Allergies    Intolerances        Home Medications:  finasteride 5 mg oral tablet: 1 tab(s) orally once a day (29 Dec 2022 13:15)  furosemide 20 mg oral tablet: 1 tab(s) orally once a day (09 Jan 2023 10:42)  latanoprost 0.005% ophthalmic solution: 1 drop(s) to each affected eye once a day (in the evening) (29 Dec 2022 13:15)  memantine 10 mg oral tablet: 1 tab(s) orally 2 times a day (29 Dec 2022 13:15)  metoprolol tartrate 50 mg oral tablet: 1 tab(s) orally 2 times a day (09 Jan 2023 10:42)  Myrbetriq 50 mg oral tablet, extended release: 1 tab(s) orally once a day (29 Dec 2022 13:15)  QUEtiapine 25 mg oral tablet: 1 tab(s) orally 2 times a day (09 Jan 2023 17:00)  sacubitril-valsartan 24 mg-26 mg oral tablet: 1 tab(s) orally 2 times a day (09 Jan 2023 10:42)  silodosin 8 mg oral capsule: 1 cap(s) orally once a day (29 Dec 2022 13:15)      ROS: 10-system review is otherwise negative except HPI above.      Primary Survey:    A - airway intact  B - bilateral breath sounds and good chest rise  C - palpable pulses in all extremities  D - GCS 15 on arrival, SAL  Exposure obtained    Vital Signs Last 24 Hrs  T(C): 36.1 (20 Feb 2023 09:23), Max: 36.1 (20 Feb 2023 09:23)  T(F): 97 (20 Feb 2023 09:23), Max: 97 (20 Feb 2023 09:23)  HR: 86 (20 Feb 2023 09:23) (86 - 86)  BP: 114/59 (20 Feb 2023 09:23) (114/59 - 114/59)  BP(mean): --  RR: 18 (20 Feb 2023 09:23) (18 - 18)  SpO2: 100% (20 Feb 2023 09:23) (100% - 100%)        Secondary Survey:   General: NAD  HEENT: Normocephalic, atraumatic, EOMI, PEERLA. no scalp lacerations   Neck: Soft, midline trachea. no c-spine tenderness  Chest: No chest wall tenderness, no subcutaneous emphysema   Cardiac: S1, S2, RRR  Respiratory: Bilateral breath sounds, clear and equal bilaterally  Abdomen: Soft, non-distended, non-tender, no rebound, no guarding.  Groin: Normal appearing, pelvis stable   Ext:  Moving b/l upper and lower extremities. Palpable Radial b/l UE, b/l DP palpable in LE.   Back: No T/L/S spine tenderness, No palpable runoff/stepoff/deformity  Rectal: No juliana blood, JAKOB with good tone    FAST:     ACCESS / DEVICES:  [X] Peripheral IV    Labs:  CAPILLARY BLOOD GLUCOSE      POCT Blood Glucose.: 101 mg/dL (20 Feb 2023 09:32)                          14.1   9.02  )-----------( 183      ( 20 Feb 2023 09:40 )             44.1       Auto Neutrophil %: 73.2 % (02-20-23 @ 09:40)  Auto Immature Granulocyte %: 0.4 % (02-20-23 @ 09:40)    02-20    139  |  104  |  26<H>  ----------------------------<  117<H>  5.1<H>   |  25  |  1.3      Calcium, Total Serum: 9.7 mg/dL (02-20-23 @ 09:40)      LFTs:             6.8  | 0.7  | 19       ------------------[76      ( 20 Feb 2023 09:40 )  3.6  | x    | 14          Lipase:x      Amylase:x         Lactate, Blood: 1.8 mmol/L (02-20-23 @ 09:40)      Coags:      RADIOLOGY:   < from: CT Head No Cont (02.20.23 @ 10:04) >  CT head: No evidence of acute transcortical infarct, acute intracranial   hemorrhage or mass effect.    CT cervical spine: No acute fracture or traumatic subluxation.      < end of copied text >  < from: Xray Pelvis AP only (02.20.23 @ 10:16) >  Bony demineralization. No definitive evidence of acute displaced   fracture. Contrast in the left ureter and bladder.    < end of copied text >  < from: CT Chest w/ IV Cont (02.20.23 @ 10:22) >  IMPRESSION:    No CT evidence of acute traumatic injury in the chest abdomen or pelvis.    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    Subcentimeter left renal mid polar exophytic hyperdensity. An outpatient   contrast-enhanced renal protocol MR of the abdomen may be considered if   further evaluation is of clinical relevance.    Additional chronic findings as above.    < end of copied text >          ---------------------------------------------------------------------------------------

## 2023-02-20 NOTE — H&P ADULT - ASSESSMENT
#Unwitnessed Fall 2/2 Mechanical vs Syncope(Arrhythmias vs MI vs Stroke vs Seizure vs Vasovagal vs Bleeding vs Medication induced)  -Head trauma, Loss of consciousness, Anti-coagulation  -Trauma work up:  -CT head:  -Admit to Tele?  -Interrogation of device by EP?  -Fall precautions  -Constant observation?  -Cardiology and Neurology consult?  -c/w or d/c AC?  -F/u EKG, TTE, Orthostasis, EEG, CT head     #Atrial fibrillation   [id number]: 4 (left systolic dysfunction, HTN, age)   - Eliquis   - cont metoprolol 75 mg bid.   - ECHO EF 27 % with severe MVR     #Severe MVR with systolic CHF   - outpatient eval for mitraclip with cardiology   - lasix 40 mg IV x1 , change to lasix 20 mg po daily   - daily weights   - cont metoprolol 75 mg po bid . cont low dose entresto 24/26 mg bid   - ischemic eval timing per cardiology , may need mitraclip via catheter, outpatient eval     #BPH   - on rapaflo and  finasteride,   - monitor     #Glaucoma   - cont home eye drops     #Misc  -Routine Lab frequency:   -DVT prophylaxis: Eliquis  -GI prophylaxis:  -Diet:  -Code status: DNR/DNI  -Activity:  -Bowel regimen:  -Urinary catheter:  -Dispo:    -Med rec confirmed with nursing home charts 87-year-old male past medical history significant for hypertension, dyslipidemia, CAD, CHF, CKD stage III, BPH, Alzheimer's dementia, anxiety and A-fib on Eliquis presents for unwitnessed fall. Fall was unwitnessed, patient was found on the floor and started complaining of back pain, for which patient was referred to ED for further work up. Although patient has dementia but he was denying any complaints at time of my interview. SNF staff endorsed that most likely patient was trying to get out of bed and then fell and was found on his back.    #Unwitnessed Fall 2/2 Mechanical vs Syncope  #r/o ACS  #H/o HFrEF  -Head trauma ?, Loss of consciousness ?, Anti-coagulation -ve  -Trauma work up: CT head, cervical spine, chest, abdomen and pelvis shows no acute traumatic event  -Admit to Tele  -Fall precaution  -Trops 0.03  -EKG shows Afib, LVH and wide QRS- same as before  -c/w Eliquis  -F/u TTE, Orthostasis  -Trend trops    #Atrial fibrillation   [id number]: 4 (left systolic dysfunction, HTN, age)   - Eliquis- readjust dose if needed after weight is obtained  - cont metoprolol 75 mg bid.     #Severe MVR with systolic CHF- NOT in exacerbation  - ECHO EF 27 % with severe MVR from 12/2022  - Patient was supposed to follow OP with cardiology for mitraclip, Not sure if he followed, no OP cards note in HIE  - daily weights   - c/w Metoprolo, Entresto, Aldactone  - Cardiology c/s if TTE shows decrease in EF    #BPH   - c/w Tamsulosin and Finasteride    #Glaucoma   - cont home eye drops     #Misc  -Routine Lab frequency: Limit routine labs  -DVT prophylaxis: Eliquis  -GI prophylaxis: None  -Diet: DASH, pureed- pending S&S  -Code status: DNR/DNI  -Activity: IAT  -Bowel regimen: Senna, Miralax  -Urinary catheter: Incontinent  -Dispo: Tele    -Med rec confirmed with nursing home charts 87-year-old male past medical history significant for hypertension, dyslipidemia, CAD, CHF, CKD stage III, BPH, Alzheimer's dementia, anxiety and A-fib on Eliquis presents for unwitnessed fall. Fall was unwitnessed, patient was found on the floor and started complaining of back pain, for which patient was referred to ED for further work up. Although patient has dementia but he was denying any complaints at time of my interview. SNF staff endorsed that most likely patient was trying to get out of bed and then fell and was found on his back.    #Unwitnessed Fall 2/2 Mechanical vs Syncope  #r/o ACS  #H/o HFrEF  -Head trauma ?, Loss of consciousness ?, Anti-coagulation -ve  -Trauma work up: CT head, cervical spine, chest, abdomen and pelvis shows no acute traumatic event  -Admit to Tele  -Fall precaution  -Trops 0.03  -EKG shows Afib, LVH and wide QRS- same as before  -c/w Eliquis  -F/u TTE, Orthostasis  -Trend trops    #Atrial fibrillation   [id number]: 4 (left systolic dysfunction, HTN, age)   - Eliquis- readjust dose if needed after weight is obtained  - cont metoprolol    #Severe MVR with systolic CHF- NOT in exacerbation  - ECHO EF 27 % with severe MVR from 12/2022  - Patient was supposed to follow OP with cardiology for mitraclip, Not sure if he followed, no OP cards note in HIE  - daily weights   - c/w Metoprolo, Entresto, Aldactone  - Cardiology c/s if TTE shows decrease in EF    #BPH   - c/w Tamsulosin and Finasteride    #Glaucoma   - cont home eye drops     #Misc  -Routine Lab frequency: Limit routine labs  -DVT prophylaxis: Eliquis  -GI prophylaxis: None  -Diet: DASH, pureed- pending S&S  -Code status: DNR/DNI  -Activity: IAT  -Bowel regimen: Senna, Miralax  -Urinary catheter: Incontinent  -Dispo: Tele    -Med rec confirmed with nursing home charts 87-year-old male past medical history significant for hypertension, dyslipidemia, CAD, CHF, CKD stage III, BPH, Alzheimer's dementia, anxiety and A-fib on Eliquis presents for unwitnessed fall. Fall was unwitnessed, patient was found on the floor and started complaining of back pain, for which patient was referred to ED for further work up. Although patient has dementia but he was denying any complaints at time of my interview. SNF staff endorsed that most likely patient was trying to get out of bed and then fell and was found on his back.    #Unwitnessed Fall 2/2 Mechanical vs Syncope  #r/o ACS  #H/o HFrEF  -Head trauma ?, Loss of consciousness ?, Anti-coagulation -ve  -Trauma work up: CT head, cervical spine, chest, abdomen and pelvis shows no acute traumatic event- left renal exophytic mass observed- OP follow up  -Admit to Tele  -Fall precaution  -Trops 0.03  -EKG shows Afib, LVH and wide QRS- same as before  -c/w Eliquis  -F/u TTE, Orthostasis  -PT eval  -Trend trops    #Atrial fibrillation   [id number]: 4 (left systolic dysfunction, HTN, age)   - Eliquis- readjust dose if needed after weight is obtained  - cont metoprolol    #Severe MVR with systolic CHF- NOT in exacerbation  - ECHO EF 27 % with severe MVR from 12/2022  - Patient was supposed to follow OP with cardiology for mitraclip, Not sure if he followed, no OP cards note in HIE  - daily weights   - c/w Metoprolo, Entresto, Aldactone  - Cardiology c/s if TTE shows decrease in EF    #BPH   - c/w Tamsulosin and Finasteride    #Glaucoma   - cont home eye drops     #Misc  -Routine Lab frequency: Limit routine labs  -DVT prophylaxis: Eliquis  -GI prophylaxis: None  -Diet: DASH, pureed- pending S&S  -Code status: DNR/DNI  -Activity: IAT  -Bowel regimen: Senna, Miralax  -Urinary catheter: Incontinent  -Dispo: Tele    -Med rec confirmed with nursing home charts

## 2023-02-20 NOTE — REASON FOR VISIT
[Cardiac Failure] : cardiac failure [Arrhythmia/ECG Abnorrmalities] : arrhythmia/ECG abnormalities [Hyperlipidemia] : hyperlipidemia

## 2023-02-20 NOTE — ED PROVIDER NOTE - OBJECTIVE STATEMENT
86 y/o M with PMH A fib on Eliquis, CKD, HTN, Alzheimer's dementia, GERD BIBEMS from NH for unwitnessed fall. Unknown HT, LOC. Per EMS pt found by staff laying on his left side       87yM w/ PMHx of Alzheimer's, BPH, gastritis, A fib, heart failure, BRAXTON, CKD 4, and HTN seen as Trauma Alert s/p unwitnessed fall at [facility] ?HT ?LOC +Eliquis.  Trauma assessment in ED: ABCs intact , GCS 14 , AAOx1. Per facility, patient was found down in his room this morning and was complaining of back pain and so was brought to the ED. Last dose of Eliquis, baseline mental status, time down all unknown. Upon arrival to ED, patient is HD stable, afebrile, not complaining of any pain. No external signs of trauma. 88 y/o M with PMH A fib on Eliquis, CKD, HTN, Alzheimer's dementia, GERD BIBEMS from NH for unwitnessed fall. Unknown HT, LOC. Per EMS pt found by staff laying on his left side on the ground this morning. Pt does not remember how he ended up on the floor. Pt has no complaints at this time.

## 2023-02-20 NOTE — ED PROVIDER NOTE - ATTENDING CONTRIBUTION TO CARE
I personally evaluated the patient. I reviewed the Resident’s or Physician Assistant’s note (as assigned above), and agree with the findings and plan except as documented in my note.   87-year-old male past medical history significant for hypertension, dyslipidemia, CAD, CHF, CKD stage III, BPH, Alzheimer's dementia, anxiety, A-fib on Eliquis, status post fall and SNF.  Fall was unwitnessed and patient was found on the floor.  As per SNF notes, patient was complaining of back pain.  Patient currently with no complaints.  Patient unsure of head trauma or LOC.  No neck pain.  No chest pain or shortness of breath.  No abdominal pain.  Vitals noted. ALERT ORIENTED TO PERSON AND PLACE, NAD GCS-15. NCAT. PERRL, EOMI. NO MIDLINE C SPINE TENDERNESS. LUNGS CLEAR B/L. CHEST NONTENDER, NO CREPITUS. RRR. ABD- SOFT NONTENDER. PELVIS STABLE NONTENDER. BACK NONTENDER. NO SPINE TENDERNESS.

## 2023-02-20 NOTE — DISCUSSION/SUMMARY
[FreeTextEntry1] : 88 y/o w PMH Alzheimers Dse, HLD, glaucoma, BPH brought to ER 12/28/22, Disch 1/9/23 with c/o per daughter progressive  weakness, confusion, frequent falls, increasing leg edema. Pt was found w volume overload , rapid A fib, EF 27% mod severe  Pt diuresed started on Eliquis.He was discharged to NH and is here for f/u

## 2023-02-21 LAB
A1C WITH ESTIMATED AVERAGE GLUCOSE RESULT: 6.7 % — HIGH (ref 4–5.6)
ALBUMIN SERPL ELPH-MCNC: 3.3 G/DL — LOW (ref 3.5–5.2)
ALP SERPL-CCNC: 77 U/L — SIGNIFICANT CHANGE UP (ref 30–115)
ALT FLD-CCNC: 14 U/L — SIGNIFICANT CHANGE UP (ref 0–41)
ANION GAP SERPL CALC-SCNC: 13 MMOL/L — SIGNIFICANT CHANGE UP (ref 7–14)
APTT BLD: 35 SEC — SIGNIFICANT CHANGE UP (ref 27–39.2)
AST SERPL-CCNC: 24 U/L — SIGNIFICANT CHANGE UP (ref 0–41)
BASOPHILS # BLD AUTO: 0.04 K/UL — SIGNIFICANT CHANGE UP (ref 0–0.2)
BASOPHILS NFR BLD AUTO: 0.7 % — SIGNIFICANT CHANGE UP (ref 0–1)
BILIRUB SERPL-MCNC: 0.8 MG/DL — SIGNIFICANT CHANGE UP (ref 0.2–1.2)
BUN SERPL-MCNC: 18 MG/DL — SIGNIFICANT CHANGE UP (ref 10–20)
CALCIUM SERPL-MCNC: 9.3 MG/DL — SIGNIFICANT CHANGE UP (ref 8.4–10.5)
CHLORIDE SERPL-SCNC: 106 MMOL/L — SIGNIFICANT CHANGE UP (ref 98–110)
CHOLEST SERPL-MCNC: 180 MG/DL — SIGNIFICANT CHANGE UP
CO2 SERPL-SCNC: 20 MMOL/L — SIGNIFICANT CHANGE UP (ref 17–32)
CREAT SERPL-MCNC: 1 MG/DL — SIGNIFICANT CHANGE UP (ref 0.7–1.5)
EGFR: 73 ML/MIN/1.73M2 — SIGNIFICANT CHANGE UP
EOSINOPHIL # BLD AUTO: 0.41 K/UL — SIGNIFICANT CHANGE UP (ref 0–0.7)
EOSINOPHIL NFR BLD AUTO: 7.5 % — SIGNIFICANT CHANGE UP (ref 0–8)
ESTIMATED AVERAGE GLUCOSE: 146 MG/DL — HIGH (ref 68–114)
GLUCOSE SERPL-MCNC: 77 MG/DL — SIGNIFICANT CHANGE UP (ref 70–99)
HCT VFR BLD CALC: 47.3 % — SIGNIFICANT CHANGE UP (ref 42–52)
HDLC SERPL-MCNC: 32 MG/DL — LOW
HGB BLD-MCNC: 14.7 G/DL — SIGNIFICANT CHANGE UP (ref 14–18)
IMM GRANULOCYTES NFR BLD AUTO: 0.4 % — HIGH (ref 0.1–0.3)
INR BLD: 1.36 RATIO — HIGH (ref 0.65–1.3)
LIPID PNL WITH DIRECT LDL SERPL: 117 MG/DL — HIGH
LYMPHOCYTES # BLD AUTO: 1.11 K/UL — LOW (ref 1.2–3.4)
LYMPHOCYTES # BLD AUTO: 20.4 % — LOW (ref 20.5–51.1)
MAGNESIUM SERPL-MCNC: 2 MG/DL — SIGNIFICANT CHANGE UP (ref 1.8–2.4)
MCHC RBC-ENTMCNC: 28.2 PG — SIGNIFICANT CHANGE UP (ref 27–31)
MCHC RBC-ENTMCNC: 31.1 G/DL — LOW (ref 32–37)
MCV RBC AUTO: 90.6 FL — SIGNIFICANT CHANGE UP (ref 80–94)
MONOCYTES # BLD AUTO: 0.78 K/UL — HIGH (ref 0.1–0.6)
MONOCYTES NFR BLD AUTO: 14.3 % — HIGH (ref 1.7–9.3)
NEUTROPHILS # BLD AUTO: 3.09 K/UL — SIGNIFICANT CHANGE UP (ref 1.4–6.5)
NEUTROPHILS NFR BLD AUTO: 56.7 % — SIGNIFICANT CHANGE UP (ref 42.2–75.2)
NON HDL CHOLESTEROL: 148 MG/DL — HIGH
NRBC # BLD: 0 /100 WBCS — SIGNIFICANT CHANGE UP (ref 0–0)
PLATELET # BLD AUTO: 107 K/UL — LOW (ref 130–400)
POTASSIUM SERPL-MCNC: 4.4 MMOL/L — SIGNIFICANT CHANGE UP (ref 3.5–5)
POTASSIUM SERPL-SCNC: 4.4 MMOL/L — SIGNIFICANT CHANGE UP (ref 3.5–5)
PROT SERPL-MCNC: 6.4 G/DL — SIGNIFICANT CHANGE UP (ref 6–8)
PROTHROM AB SERPL-ACNC: 15.6 SEC — HIGH (ref 9.95–12.87)
RBC # BLD: 5.22 M/UL — SIGNIFICANT CHANGE UP (ref 4.7–6.1)
RBC # FLD: 17 % — HIGH (ref 11.5–14.5)
SODIUM SERPL-SCNC: 139 MMOL/L — SIGNIFICANT CHANGE UP (ref 135–146)
TRIGL SERPL-MCNC: 150 MG/DL — HIGH
TROPONIN T SERPL-MCNC: 0.01 NG/ML — SIGNIFICANT CHANGE UP
WBC # BLD: 5.45 K/UL — SIGNIFICANT CHANGE UP (ref 4.8–10.8)
WBC # FLD AUTO: 5.45 K/UL — SIGNIFICANT CHANGE UP (ref 4.8–10.8)

## 2023-02-21 PROCEDURE — 99233 SBSQ HOSP IP/OBS HIGH 50: CPT

## 2023-02-21 RX ADMIN — MEMANTINE HYDROCHLORIDE 10 MILLIGRAM(S): 10 TABLET ORAL at 06:28

## 2023-02-21 RX ADMIN — SENNA PLUS 2 TABLET(S): 8.6 TABLET ORAL at 21:36

## 2023-02-21 RX ADMIN — DONEPEZIL HYDROCHLORIDE 10 MILLIGRAM(S): 10 TABLET, FILM COATED ORAL at 21:36

## 2023-02-21 RX ADMIN — QUETIAPINE FUMARATE 25 MILLIGRAM(S): 200 TABLET, FILM COATED ORAL at 17:22

## 2023-02-21 RX ADMIN — LATANOPROST 1 DROP(S): 0.05 SOLUTION/ DROPS OPHTHALMIC; TOPICAL at 21:36

## 2023-02-21 RX ADMIN — QUETIAPINE FUMARATE 25 MILLIGRAM(S): 200 TABLET, FILM COATED ORAL at 06:29

## 2023-02-21 RX ADMIN — Medication 50 MILLIGRAM(S): at 06:28

## 2023-02-21 RX ADMIN — MEMANTINE HYDROCHLORIDE 10 MILLIGRAM(S): 10 TABLET ORAL at 17:21

## 2023-02-21 RX ADMIN — APIXABAN 5 MILLIGRAM(S): 2.5 TABLET, FILM COATED ORAL at 17:22

## 2023-02-21 RX ADMIN — SACUBITRIL AND VALSARTAN 1 TABLET(S): 24; 26 TABLET, FILM COATED ORAL at 17:21

## 2023-02-21 RX ADMIN — FINASTERIDE 5 MILLIGRAM(S): 5 TABLET, FILM COATED ORAL at 11:14

## 2023-02-21 RX ADMIN — Medication 50 MILLIGRAM(S): at 17:22

## 2023-02-21 RX ADMIN — APIXABAN 5 MILLIGRAM(S): 2.5 TABLET, FILM COATED ORAL at 06:28

## 2023-02-21 RX ADMIN — POLYETHYLENE GLYCOL 3350 17 GRAM(S): 17 POWDER, FOR SOLUTION ORAL at 11:14

## 2023-02-21 RX ADMIN — TAMSULOSIN HYDROCHLORIDE 0.4 MILLIGRAM(S): 0.4 CAPSULE ORAL at 21:37

## 2023-02-21 NOTE — PROGRESS NOTE ADULT - ASSESSMENT
88 y/o man with PMH of HTN, hyperlipidemia, ?CAD, HFrEF, CKD 3, BPH, Alzheimer's dementia, Afib recently diagnosed in 12/22 and on Eliquis and anxiety presented to the ED from the NH for an unwitnessed fall. Pt is an unreliable historian due to dementia. Per chart, pt was found on the floor and complained of back pain and brought to the ED.     1. s/p unwitnessed fall - mechanical vs syncope  fall precautions  continue telemetry  repeat orthostatics in AM - obtain manual BP if difficulty with machine  if + orthostasis, pt on multiple meds that can be contributing and will be evaluated  YANN stockings  PT consulted  trop 0.03 -> 0.01  EKG reviewed and interpreted by me: Afib with vent rate 99 and LAD  repeat ECHO (EF 27%, severe MR in Dec 2022 and plan for outpt eval for Mitraclip)  cardio eval     2. Chronic Afib   - on lopressor and Eliquis    3. Chronic HFrEF - pt appears euvolemic - possibly tachy induced per chart review  on Entresto, spironolactone, metoprolol  repeat ECHO    4. CKD stage 3 -  stable at baseline    5. BPH - on flomax and finasteride  Glaucoma - on home eye drops     DNR/DNI status  high risk pt    PROGRESS NOTE HANDOFF    Pending: orthostatics in AM, ECHO, continue PT    Family discussion: resident updated family today    Disposition: SNF

## 2023-02-21 NOTE — PHYSICAL THERAPY INITIAL EVALUATION ADULT - ADDITIONAL COMMENTS
pt appears to be a poor historian, as per chart, pt came from [facility] for rehab, prior to NH, pt lives alone in apt, PCA stated pt's dtr visited today

## 2023-02-21 NOTE — PHYSICAL THERAPY INITIAL EVALUATION ADULT - PHYSICAL ASSIST/NONPHYSICAL ASSIST: SIT/STAND, REHAB EVAL
Problem: Alcohol Withdrawal Acute, Risk/Actual (Adult)  Intervention: Minimize/Manage Withdrawal Symptoms     Pt arrived to unit around 02:50am via wheelchair. A/O times 3. VSS. Afebrile. CMS/neuros intact.  LS clear, equal bilaterally. BS active and audible in all quadrants. Passing flatus. Last BM = 10/27.  Denies nausea, auditory and visual hallucinations. Denies SOB, headache. Endorses pain under left breast that radiates to left side of the back. Administered 650mg of tylenol and notified oncoming RN.  MSSA scores 8, 12, and 12. 5mg and 10mg of PRN valium administered as needed. Would like to get the flu shot but has an egg allergy.  PIV fluid infusing at 100mL/hr. On regular diet. Offer standby assist to bathroom.  Bed alarm activated for safety. Able to make needs known. Continue POC.             verbal cues/1 person assist

## 2023-02-21 NOTE — PHYSICAL THERAPY INITIAL EVALUATION ADULT - FOLLOWS COMMANDS/ANSWERS QUESTIONS, REHAB EVAL
pt does have difficulty at times understanding sequencing/commands/75% of the time/able to follow single-step instructions

## 2023-02-21 NOTE — MEDICAL STUDENT PROGRESS NOTE(EDUCATION) - NS MD HP STUD ASPLAN PLAN FT
#Unwitnessed Fall 2/2 Mechanical vs Syncope  #r/o ACS  #H/o HFrEF  -Head trauma ?, Loss of consciousness ?, Anti-coagulation -ve  -Trauma work up: CT head, cervical spine, chest, abdomen and pelvis shows no acute traumatic event- left renal exophytic mass observed- OP follow up  -Admit to Tele  -Fall precaution  -Trops 0.03  -EKG shows Afib, LVH and wide QRS- same as before  -c/w Eliquis  -F/u TTE, Orthostasis  -PT eval  -Trend trops    #Atrial fibrillation   [id number]: 4 (left systolic dysfunction, HTN, age)   - Eliquis- readjust dose if needed after weight is obtained  - cont metoprolol    #Severe MVR with systolic CHF- NOT in exacerbation  - ECHO EF 27 % with severe MVR from 12/2022  - Patient was supposed to follow OP with cardiology for mitraclip, Not sure if he followed, no OP cards note in [facility]  - daily weights   - c/w Metoprolo, Entresto, Aldactone  - Cardiology c/s if TTE shows decrease in EF    #BPH   - c/w Tamsulosin and Finasteride    #Glaucoma   - cont home eye drops     #Misc  -Routine Lab frequency: Limit routine labs  -DVT prophylaxis: Eliquis  -GI prophylaxis: None  -Diet: DASH, pureed- pending S&S  -Code status: DNR/DNI  -Activity: IAT  -Bowel regimen: Senna, Miralax  -Urinary catheter: Incontinent  -Dispo: Tele    -Med rec confirmed with nursing home charts #Unwitnessed Fall 2/2 Mechanical vs Syncope  #r/o ACS  #H/o HFrEF  -Head trauma ?, Loss of consciousness ?, Anti-coagulation -ve  -Trauma work up: CT head, cervical spine, chest, abdomen and pelvis shows no acute traumatic event- left renal exophytic mass observed- OP follow up  -continue monitoring on tele  -Fall precaution  -Trops 0.03 -> 0.01  -EKG shows Afib, LVH and wide QRS- same as before  -c/w Eliquis  -F/u TTE, Orthostasis  -PT eval    #Atrial fibrillation   [id number]: 4 (left systolic dysfunction, HTN, age)   - Eliquis- readjust dose if needed after weight is obtained  - cont metoprolol    #Severe MVR with systolic CHF- NOT in exacerbation  - ECHO EF 27 % with severe MVR from 12/2022  - Patient was supposed to follow OP with cardiology for mitraclip, Not sure if he followed, no OP cards note in [facility]  - daily weights   - c/w Metoprolol, Entresto, Aldactone  - Cardiology c/s if TTE shows decrease in EF    #BPH   - c/w Tamsulosin and Finasteride    #Glaucoma   - cont home eye drops     #Misc  -Routine Lab frequency: Limit routine labs  -DVT prophylaxis: Eliquis  -GI prophylaxis: None  -Diet: DASH, pureed- pending S&S  -Code status: DNR/DNI  -Activity: IAT  -Bowel regimen: Senna, Miralax  -Urinary catheter: Incontinent  -Dispo: Tele    -Med rec confirmed with nursing home charts #Unwitnessed Fall 2/2 Mechanical vs Syncope  #r/o ACS  #H/o HFrEF  -Head trauma ?, Loss of consciousness ?, Anti-coagulation -ve  -Trauma work up: CT head, cervical spine, chest, abdomen and pelvis shows no acute traumatic event- left renal exophytic mass observed- OP follow up  -continue monitoring on tele  -Fall precaution  -Trops 0.03 -> 0.01  -EKG shows Afib, LVH and wide QRS- same as before  -c/w Eliquis  -Orthostatic vitals obtained:   - Supine /68 HR 79  - Sitting /78   -F/u TTE  -PT eval    #Atrial fibrillation   [id number]: 4 (left systolic dysfunction, HTN, age)   - Eliquis- readjust dose if needed after weight is obtained  - cont metoprolol    #Severe MVR with systolic CHF- NOT in exacerbation  - ECHO EF 27 % with severe MVR from 12/2022  - Patient was supposed to follow OP with cardiology for mitraclip, Not sure if he followed, no OP cards note in HIE  - daily weights   - c/w Metoprolol, Entresto, Aldactone  - Cardiology c/s if TTE shows decrease in EF    #BPH   - c/w Tamsulosin and Finasteride    #Glaucoma   - cont home eye drops     #Misc  -Routine Lab frequency: Limit routine labs  -DVT prophylaxis: Eliquis  -GI prophylaxis: None  -Diet: DASH, pureed- pending S&S  -Code status: DNR/DNI  -Activity: IAT  -Bowel regimen: Senna, Miralax  -Urinary catheter: Incontinent  -Dispo: Tele    -Med rec confirmed with nursing home charts

## 2023-02-21 NOTE — CHART NOTE - NSCHARTNOTEFT_GEN_A_CORE
Tertiary Trauma Survey (TTS)    Date of TTS: 02-21-23 @ 12:26   Admit Date: 02-20-23  Trauma Activation: CODE / ALERT / CONSULT  Admit GCS:        E-     V-     M-     HPI:  **Patient has dementia, history obtained form SNF staff**    87-year-old male past medical history significant for hypertension, dyslipidemia, CAD, CHF, CKD stage III, BPH, Alzheimer's dementia, anxiety and A-fib on Eliquis presents for unwitnessed fall. Fall was unwitnessed, patient was found on the floor and started complaining of back pain, for which patient was referred to ED for further work up. Although patient has dementia but he was denying any complaints at time of my interview. SNF staff endorsed that most likely patient was trying to get out of bed and then fell and was found on his back.    In the ED, imaging showed no acute traumatic events.    Vital Signs Last 24 Hrs:  T(F): 97.7 (20 Feb 2023 09:44), Max: 97.7 (20 Feb 2023 09:44)  HR: 86 (20 Feb 2023 09:23) (86 - 86)  BP: 114/59 (20 Feb 2023 09:23) (114/59 - 114/59)  RR: 18 (20 Feb 2023 09:23) (18 - 18)  SpO2: 100% (20 Feb 2023 09:23) (100% - 100%) on RA   (20 Feb 2023 13:36)    Patient seen and examined.     PHYSICAL EXAM: LIMITED PHYSICAL EXAM DUE TO PATIENT'S HX OF DEMENTIA.   General: NAD  HEENT: Normocephalic, atraumatic, EOMI, PEERLA. no scalp lacerations   Neck: Soft, midline trachea. no c-spine tenderness  Chest: No chest wall tenderness, no subcutaneous emphysema   Cardiac: S1, S2, RRR  Respiratory: Bilateral breath sounds, clear and equal bilaterally  Abdomen: Soft, non-distended, non-tender, no rebound, no guarding.  Groin: Normal appearing, pelvis stable   Ext:  Moving b/l upper and lower extremities. Palpable Radial b/l UE, b/l DP palpable in LE.   Back: No T/L/S spine tenderness, No palpable runoff/stepoff/deformity    Vital Signs Last 24 Hrs  T(C): 33.9 (21 Feb 2023 04:30), Max: 36.2 (20 Feb 2023 15:34)  T(F): 93.1 (21 Feb 2023 04:30), Max: 97.1 (20 Feb 2023 15:34)  HR: 101 (21 Feb 2023 06:29) (82 - 120)  BP: 92/56 (21 Feb 2023 06:29) (81/52 - 128/60)  BP(mean): --  RR: 18 (21 Feb 2023 06:29) (18 - 18)  SpO2: 99% (21 Feb 2023 06:29) (95% - 99%)    Parameters below as of 21 Feb 2023 06:29  Patient On (Oxygen Delivery Method): room air        Labs:  CAPILLARY BLOOD GLUCOSE                              14.7   5.45  )-----------( 107      ( 21 Feb 2023 04:30 )             47.3       Auto Neutrophil %: 56.7 % (02-21-23 @ 04:30)  Auto Immature Granulocyte %: 0.4 % (02-21-23 @ 04:30)    02-21    139  |  106  |  18  ----------------------------<  77  4.4   |  20  |  1.0      Calcium, Total Serum: 9.3 mg/dL (02-21-23 @ 04:30)      LFTs:             6.4  | 0.8  | 24       ------------------[77      ( 21 Feb 2023 04:30 )  3.3  | x    | 14          Lipase:x      Amylase:x         Lactate, Blood: 1.8 mmol/L (02-20-23 @ 09:40)      Coags:     15.60  ----< 1.36    ( 21 Feb 2023 04:30 )     35.0        CARDIAC MARKERS ( 21 Feb 2023 04:30 )  x     / 0.01 ng/mL / x     / x     / x      CARDIAC MARKERS ( 20 Feb 2023 21:12 )  x     / 0.01 ng/mL / x     / x     / x      CARDIAC MARKERS ( 20 Feb 2023 12:08 )  x     / 0.03 ng/mL / x     / x     / x          RADIOLOGICAL FINDINGS REVIEW:    < from: CT Abdomen and Pelvis w/ IV Cont (02.20.23 @ 10:22) >  IMPRESSION:  No CT evidence of acute traumatic injury in the chest abdomen or pelvis.  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  Subcentimeter left renal mid polar exophytic hyperdensity. An outpatient   contrast-enhanced renal protocol MR of the abdomen may be considered if   further evaluation is of clinical relevance.  < end of copied text >    < from: Xray Pelvis AP only (02.20.23 @ 10:16) >  impression:  Bony demineralization. No definitive evidence of acute displaced   fracture. Contrast in the left ureter and bladder.  < end of copied text >    < from: Xray Chest 1 View AP/PA (02.20.23 @ 10:15) >  Impression:  No acute abnormality on frontal chest radiograph.  < end of copied text >    < from: CT Cervical Spine No Cont (02.20.23 @ 10:09) >  IMPRESSION:  CT head: No evidence of acute transcortical infarct, acute intracranial   hemorrhage or mass effect.  CT cervical spine: No acute fracture or traumatic subluxation.  --- End of Report ---  < end of copied text >        ASSESSMENT/ PLAN:   87yM w/ PMHx of Alzheimer's, BPH, gastritis, A fib, heart failure, BRAXTON, CKD 4, and HTN seen as Trauma Alert s/p unwitnessed fall at [facility] ?HT ?LOC +Eliquis. .  Trauma assessment in ED: ABCs intact , GCS 15 , AAOx3 , with physical exam findings, imaging, and labs as documented above, injuries are identified:     - All images/reports reviewed. No further traumatic work-up warranted.
Spoke with the cardio fellow. Discussed on the echo findings on this patient. The patient has no echo done this admission. the patient was seen by cardiology on 1/7/23 and was advised given Severe MR on echo, consider eval for Yohana clip outpatient. The cardio team requested to call back once the echo is done this admission.

## 2023-02-21 NOTE — MEDICAL STUDENT PROGRESS NOTE(EDUCATION) - SUBJECTIVE AND OBJECTIVE BOX
GORDON MERINO  87y  Male    Subjective  Patient is a 87y old  Male who presents with a chief complaint of     INTERVAL HPI/OVERNIGHT EVENTS:    Objective:    VITALS:  T(C): 33.9 (02-21-23 @ 04:30), Max: 36.5 (02-20-23 @ 09:44)  HR: 101 (02-21-23 @ 06:29) (82 - 120)  BP: 92/56 (02-21-23 @ 06:29) (81/52 - 128/60)  RR: 18 (02-21-23 @ 06:29) (18 - 18)  SpO2: 99% (02-21-23 @ 06:29) (95% - 100%)  Wt(kg): --Vital Signs Last 24 Hrs  T(C): 33.9 (21 Feb 2023 04:30), Max: 36.5 (20 Feb 2023 09:44)  T(F): 93.1 (21 Feb 2023 04:30), Max: 97.7 (20 Feb 2023 09:44)  HR: 101 (21 Feb 2023 06:29) (82 - 120)  BP: 92/56 (21 Feb 2023 06:29) (81/52 - 128/60)  BP(mean): --  RR: 18 (21 Feb 2023 06:29) (18 - 18)  SpO2: 99% (21 Feb 2023 06:29) (95% - 100%)    Parameters below as of 21 Feb 2023 06:29  Patient On (Oxygen Delivery Method): 3L O2 Via NC    MEDICATIONS  (STANDING):  apixaban 5 milliGRAM(s) Oral every 12 hours  donepezil 10 milliGRAM(s) Oral at bedtime  finasteride 5 milliGRAM(s) Oral daily  furosemide    Tablet 20 milliGRAM(s) Oral daily  latanoprost 0.005% Ophthalmic Solution 1 Drop(s) Both EYES at bedtime  memantine 10 milliGRAM(s) Oral two times a day  metoprolol tartrate 50 milliGRAM(s) Oral two times a day  polyethylene glycol 3350 17 Gram(s) Oral daily  QUEtiapine 25 milliGRAM(s) Oral two times a day  sacubitril 24 mG/valsartan 26 mG 1 Tablet(s) Oral two times a day  senna 2 Tablet(s) Oral at bedtime  spironolactone 25 milliGRAM(s) Oral daily  tamsulosin 0.4 milliGRAM(s) Oral at bedtime    PHYSICAL EXAM:  GENERAL: NAD, well-groomed, well-developed  HEAD:  Atraumatic, Normocephalic  NERVOUS SYSTEM:  Alert & Oriented X1, Pt alert only to self,   HEART: Regular rate and rhythm; S1, S2, No murmurs, rubs, or gallops  ABDOMEN: Soft, Nontender, Nondistended; Bowel sounds present  EXTREMITIES:  2+ Peripheral Pulses, No clubbing, cyanosis, or edema    LABS:                     14.1   9.02  )-----------( 183      ( 20 Feb 2023 09:40 )             44.1     02-20    139  |  104  |  26<H>  ----------------------------<  117<H>  5.1<H>   |  25  |  1.3    Ca    9.7      20 Feb 2023 09:40    TPro  6.8  /  Alb  3.6  /  TBili  0.7  /  DBili  x   /  AST  19  /  ALT  14  /  AlkPhos  76  02-20    CARDIAC MARKERS ( 20 Feb 2023 21:12 )  x     / 0.01 ng/mL / x     / x     / x      CARDIAC MARKERS ( 20 Feb 2023 12:08 )  x     / 0.03 ng/mL / x     / x     / x        RADIOLOGY & ADDITIONAL TESTS:    20-Feb-2023 10:04, CT Head No Cont  CT Head No Cont:   	ACC: [id number] EXAM:  CT CERVICAL SPINE   ORDERED BY: ALVA TAVARES   	ACC: [id number] EXAM:  CT BRAIN   ORDERED BY: ALVA TAVARES   	PROCEDURE DATE:  02/20/2023    	INTERPRETATION:  CLINICAL INDICATION: Trauma  	CT BRAIN:  	TECHNIQUE:  Multiple contiguous axial images were obtained from the skull   	base to the vertex without the use of intravenous contrast. Reformatted   	coronal and sagittal images were subsequently obtained and reviewed.  	COMPARISON EXAMINATION: 12/28/2022  	FINDINGS:  	There is no CT evidence of acute transcortical infarct. Age-related   	involutional changes and chronic microvascular ischemic changes.  	  	There is no hydrocephalus, mass effect, midline shift, or acute   	intracranial hemorrhage. No extra-axial collection. Basal cisterns are   	patent.  	  	The visualized paranasal sinuses and mastoid air cells are clear.  	  	The calvarium is intact.  	  	Bilateral cataract surgery  	  	CT CERVICAL SPINE:  	TECHNIQUE:  Axial images were obtained through the cervicalspine using   	multislice helical technique.  Reformatted coronal and sagittal images   	were subsequently obtained and reviewed.  	COMPARISON EXAMINATION:  12/17/2021  	FINDINGS:  	There is no prevertebral soft tissue swelling. There is no splaying of   	the spinous processes. The occipital condyles are normal. Lateral masses   	of C1 align normally with C2. The atlantodental and atlanto-occipital   	joints are maintained. No lucent fracture line is identified. There is no   	spondylolisthesis. Facetjoint alignments are maintained.  	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 Findings include marginal osteophytes, uncovertebral spurring,   	loss of normal disc space height and endplate sclerosis, and facet joint   	space compartment narrowing with subchondral sclerosis and hypertrophic   	osteophytes at multiple levels. Canal contents are suboptimally evaluated   	inherent to CT technique.  	IMPRESSION:  	CT head: No evidence of acute transcortical infarct, acute intracranial   	hemorrhage or mass effect.  	CT cervical spine: No acute fracture or traumatic subluxation.  	  	--- End of Report ---  	CARL ANTONIO MD; Attending Radiologist  	This document has been electronically signed. Feb 20 202310:56AM    20-Feb-2023 10:09, CT Cervical Spine No Cont  CT Cervical Spine No Cont:   	ACC: [id number] EXAM:  CT CERVICAL SPINE   ORDERED BY: ALVA TAVARES   	ACC: [id number] EXAM:  CT BRAIN   ORDERED BY: ALVA TAVARES   	PROCEDURE DATE:  02/20/2023    	INTERPRETATION:  CLINICAL INDICATION: Trauma  	CT BRAIN:  	  	TECHNIQUE:  Multiple contiguous axial images were obtained from the skull   	base to the vertex without the use of intravenous contrast. Reformatted   	coronal and sagittal images were subsequently obtained and reviewed.  	COMPARISON EXAMINATION: 12/28/2022  	FINDINGS:  	There is no CT evidence of acute transcortical infarct. Age-related   	involutional changes and chronic microvascular ischemic changes.  	There is no hydrocephalus, mass effect, midline shift, or acute   	intracranial hemorrhage. No extra-axial collection. Basal cisterns are   	patent.  	The visualized paranasal sinuses and mastoid air cells are clear.  	The calvarium is intact.  	Bilateral cataract surgery.  	CT CERVICAL SPINE:  	TECHNIQUE:  Axial images were obtained through the cervicalspine using   	multislice helical technique.  Reformatted coronal and sagittal images   	were subsequently obtained and reviewed.  	COMPARISON EXAMINATION:  12/17/2021  	FINDINGS:  	There is no prevertebral soft tissue swelling. There is no splaying of   	the spinous processes. The occipital condyles are normal. Lateral masses   	of C1 align normally with C2. The atlantodental and atlanto-occipital   	joints are maintained. No lucent fracture line is identified. There is no   	spondylolisthesis. Facetjoint alignments are maintained.  	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 Findings include marginal osteophytes, uncovertebral spurring,   	loss of normal disc space height and endplate sclerosis, and facet joint   	space compartment narrowing with subchondral sclerosis and hypertrophic   	osteophytes at multiple levels. Canal contents are suboptimally evaluated   	inherent to CT technique.  	IMPRESSION:  	CT head: No evidence of acute transcortical infarct, acute intracranial   	hemorrhage or mass effect.  	CT cervical spine: No acute fracture or traumatic subluxation.  	--- End of Report ---  	CARL ANTONIO MD; Attending Radiologist  	This document has been electronically signed. Feb 20 202310:56AM  X-Ray, Fluoroscopy:    20-Feb-2023 10:04, CT Head No Cont  PACS Image: Image(s) Available    20-Feb-2023 10:09, CT Cervical Spine No Cont  PACS Image: Image(s) Available    20-Feb-2023 10:15, Xray Chest 1 View AP/PA  PACS Image: Image(s) Available  Xray Chest 1 View AP/PA:   	ACC: [id number] EXAM:  XR CHEST 1 VIEW   ORDERED BY: ALVA TAVARES   	PROCEDURE DATE:  02/20/2023    	INTERPRETATION:  STUDY INDICATION: Trauma Code  	Comparison: 1/5/2023.  	Technique/Positioning: Frontal view of the chest were obtained.  	Findings:  	Support devices: None.  	Cardiac/mediastinum/hilum: Within normal limits.  	Lung parenchyma/Pleura: No consolidation, effusion or pneumothorax. Right   	apical nodular opacity.  	Skeleton/soft tissues:  No radiographically evident acutedisplaced   	fracture within the limitations of this exam.  	Impression:  	No acute abnormality on frontal chest radiograph.  	--- End of Report ---  	MAMIE TAYLOR MD; Attending Radiologist  	This document has been electronically signed. Feb20 2023  2:00PM    20-Feb-2023 10:16, Xray Pelvis AP only  PACS Image: Image(s) Available  Xray Pelvis AP only:   	ACC: [id number] EXAM:  XR PELVIS AP ONLY 1-2 VIEWS   ORDERED BY: ALVA TAVARES   	PROCEDURE DATE:  02/20/2023    	INTERPRETATION:  Clinical History / Reason for exam: Trauma  	Technique: Single view of AP pelvis obtained.  	Comparison: Radiograph dated 12/16/2021  	Findings/  	impression  	Bony demineralization. No definitive evidence of acute displaced   	fracture. Contrast in the left ureter and bladder.  	--- End of Report ---  	SHALONDA CUEVAS MD; Attending Radiologist  	This document has been electronically signed. Feb 20 2023 10:29AM           GORDON MERINO  87y  Male    Subjective  Patient is an 87-year-old male with pmhx significant for hypertension, dyslipidemia, CAD, CHF, CKD stage III, BPH, Alzheimer's dementia, anxiety and A-fib on Eliquis presents for unwitnessed fall. Fall was unwitnessed, patient was found on the floor and started complaining of back pain, for which patient was referred to ED for further work up.     INTERVAL HPI/OVERNIGHT EVENTS: Pt resting comfortably in bed, not in acute distress, no complaints during examination. Pt remains alert and oriented only to self.    Objective:    VITALS:  T(C): 33.9 (02-21-23 @ 04:30), Max: 36.5 (02-20-23 @ 09:44)  HR: 101 (02-21-23 @ 06:29) (82 - 120)  BP: 92/56 (02-21-23 @ 06:29) (81/52 - 128/60)  RR: 18 (02-21-23 @ 06:29) (18 - 18)  SpO2: 99% (02-21-23 @ 06:29) (95% - 100%)  Wt(kg): --Vital Signs Last 24 Hrs  T(C): 33.9 (21 Feb 2023 04:30), Max: 36.5 (20 Feb 2023 09:44)  T(F): 93.1 (21 Feb 2023 04:30), Max: 97.7 (20 Feb 2023 09:44)  HR: 101 (21 Feb 2023 06:29) (82 - 120)  BP: 92/56 (21 Feb 2023 06:29) (81/52 - 128/60)  BP(mean): --  RR: 18 (21 Feb 2023 06:29) (18 - 18)  SpO2: 99% (21 Feb 2023 06:29) (95% - 100%)    Parameters below as of 21 Feb 2023 06:29  Patient On (Oxygen Delivery Method): 3L O2 Via NC    MEDICATIONS  (STANDING):  apixaban 5 milliGRAM(s) Oral every 12 hours  donepezil 10 milliGRAM(s) Oral at bedtime  finasteride 5 milliGRAM(s) Oral daily  furosemide    Tablet 20 milliGRAM(s) Oral daily  latanoprost 0.005% Ophthalmic Solution 1 Drop(s) Both EYES at bedtime  memantine 10 milliGRAM(s) Oral two times a day  metoprolol tartrate 50 milliGRAM(s) Oral two times a day  polyethylene glycol 3350 17 Gram(s) Oral daily  QUEtiapine 25 milliGRAM(s) Oral two times a day  sacubitril 24 mG/valsartan 26 mG 1 Tablet(s) Oral two times a day  senna 2 Tablet(s) Oral at bedtime  spironolactone 25 milliGRAM(s) Oral daily  tamsulosin 0.4 milliGRAM(s) Oral at bedtime    PHYSICAL EXAM:  GENERAL: NAD, well-groomed, well-developed  HEAD:  Atraumatic, Normocephalic  NERVOUS SYSTEM:  Alert & Oriented X1, Pt alert only to self,   HEART: Heart rate irregularly irregular on exam. S1, S2, No murmurs, rubs, or gallops  ABDOMEN: Soft, Nontender, Nondistended; Bowel sounds present  EXTREMITIES:  2+ Peripheral Pulses, No clubbing, cyanosis, or edema    LABS:                     14.1   9.02  )-----------( 183      ( 20 Feb 2023 09:40 )             44.1     02-20    139  |  104  |  26<H>  ----------------------------<  117<H>  5.1<H>   |  25  |  1.3    Ca    9.7      20 Feb 2023 09:40    TPro  6.8  /  Alb  3.6  /  TBili  0.7  /  DBili  x   /  AST  19  /  ALT  14  /  AlkPhos  76  02-20    CARDIAC MARKERS ( 20 Feb 2023 21:12 )  x     / 0.01 ng/mL / x     / x     / x      CARDIAC MARKERS ( 20 Feb 2023 12:08 )  x     / 0.03 ng/mL / x     / x     / x        RADIOLOGY & ADDITIONAL TESTS:    20-Feb-2023 10:04, CT Head No Cont  CT Head No Cont:   	ACC: [id number] EXAM:  CT CERVICAL SPINE   ORDERED BY: ALVA TAVARES   	ACC: [id number] EXAM:  CT BRAIN   ORDERED BY: ALVA TAVARES   	PROCEDURE DATE:  02/20/2023    	INTERPRETATION:  CLINICAL INDICATION: Trauma  	CT BRAIN:  	TECHNIQUE:  Multiple contiguous axial images were obtained from the skull   	base to the vertex without the use of intravenous contrast. Reformatted   	coronal and sagittal images were subsequently obtained and reviewed.  	COMPARISON EXAMINATION: 12/28/2022  	FINDINGS:  	There is no CT evidence of acute transcortical infarct. Age-related   	involutional changes and chronic microvascular ischemic changes.  	  	There is no hydrocephalus, mass effect, midline shift, or acute   	intracranial hemorrhage. No extra-axial collection. Basal cisterns are   	patent.  	  	The visualized paranasal sinuses and mastoid air cells are clear.  	  	The calvarium is intact.  	  	Bilateral cataract surgery  	  	CT CERVICAL SPINE:  	TECHNIQUE:  Axial images were obtained through the cervicalspine using   	multislice helical technique.  Reformatted coronal and sagittal images   	were subsequently obtained and reviewed.  	COMPARISON EXAMINATION:  12/17/2021  	FINDINGS:  	There is no prevertebral soft tissue swelling. There is no splaying of   	the spinous processes. The occipital condyles are normal. Lateral masses   	of C1 align normally with C2. The atlantodental and atlanto-occipital   	joints are maintained. No lucent fracture line is identified. There is no   	spondylolisthesis. Facetjoint alignments are maintained.  	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 Findings include marginal osteophytes, uncovertebral spurring,   	loss of normal disc space height and endplate sclerosis, and facet joint   	space compartment narrowing with subchondral sclerosis and hypertrophic   	osteophytes at multiple levels. Canal contents are suboptimally evaluated   	inherent to CT technique.  	IMPRESSION:  	CT head: No evidence of acute transcortical infarct, acute intracranial   	hemorrhage or mass effect.  	CT cervical spine: No acute fracture or traumatic subluxation.  	  	--- End of Report ---  	CARL ANTONIO MD; Attending Radiologist  	This document has been electronically signed. Feb 20 202310:56AM    20-Feb-2023 10:09, CT Cervical Spine No Cont  CT Cervical Spine No Cont:   	ACC: [id number] EXAM:  CT CERVICAL SPINE   ORDERED BY: ALVA TAVARES   	ACC: [id number] EXAM:  CT BRAIN   ORDERED BY: ALVA TAVARES   	PROCEDURE DATE:  02/20/2023    	INTERPRETATION:  CLINICAL INDICATION: Trauma  	CT BRAIN:  	  	TECHNIQUE:  Multiple contiguous axial images were obtained from the skull   	base to the vertex without the use of intravenous contrast. Reformatted   	coronal and sagittal images were subsequently obtained and reviewed.  	COMPARISON EXAMINATION: 12/28/2022  	FINDINGS:  	There is no CT evidence of acute transcortical infarct. Age-related   	involutional changes and chronic microvascular ischemic changes.  	There is no hydrocephalus, mass effect, midline shift, or acute   	intracranial hemorrhage. No extra-axial collection. Basal cisterns are   	patent.  	The visualized paranasal sinuses and mastoid air cells are clear.  	The calvarium is intact.  	Bilateral cataract surgery.  	CT CERVICAL SPINE:  	TECHNIQUE:  Axial images were obtained through the cervicalspine using   	multislice helical technique.  Reformatted coronal and sagittal images   	were subsequently obtained and reviewed.  	COMPARISON EXAMINATION:  12/17/2021  	FINDINGS:  	There is no prevertebral soft tissue swelling. There is no splaying of   	the spinous processes. The occipital condyles are normal. Lateral masses   	of C1 align normally with C2. The atlantodental and atlanto-occipital   	joints are maintained. No lucent fracture line is identified. There is no   	spondylolisthesis. Facetjoint alignments are maintained.  	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 Findings include marginal osteophytes, uncovertebral spurring,   	loss of normal disc space height and endplate sclerosis, and facet joint   	space compartment narrowing with subchondral sclerosis and hypertrophic   	osteophytes at multiple levels. Canal contents are suboptimally evaluated   	inherent to CT technique.  	IMPRESSION:  	CT head: No evidence of acute transcortical infarct, acute intracranial   	hemorrhage or mass effect.  	CT cervical spine: No acute fracture or traumatic subluxation.  	--- End of Report ---  	CARL ANTONIO MD; Attending Radiologist  	This document has been electronically signed. Feb 20 202310:56AM  X-Ray, Fluoroscopy:    20-Feb-2023 10:04, CT Head No Cont  PACS Image: Image(s) Available    20-Feb-2023 10:09, CT Cervical Spine No Cont  PACS Image: Image(s) Available    20-Feb-2023 10:15, Xray Chest 1 View AP/PA  PACS Image: Image(s) Available  Xray Chest 1 View AP/PA:   	ACC: [id number] EXAM:  XR CHEST 1 VIEW   ORDERED BY: ALVA TAVARES   	PROCEDURE DATE:  02/20/2023    	INTERPRETATION:  STUDY INDICATION: Trauma Code  	Comparison: 1/5/2023.  	Technique/Positioning: Frontal view of the chest were obtained.  	Findings:  	Support devices: None.  	Cardiac/mediastinum/hilum: Within normal limits.  	Lung parenchyma/Pleura: No consolidation, effusion or pneumothorax. Right   	apical nodular opacity.  	Skeleton/soft tissues:  No radiographically evident acutedisplaced   	fracture within the limitations of this exam.  	Impression:  	No acute abnormality on frontal chest radiograph.  	--- End of Report ---  	MAMIE TAYLOR MD; Attending Radiologist  	This document has been electronically signed. Feb20 2023  2:00PM    20-Feb-2023 10:16, Xray Pelvis AP only  PACS Image: Image(s) Available  Xray Pelvis AP only:   	ACC: [id number] EXAM:  XR PELVIS AP ONLY 1-2 VIEWS   ORDERED BY: ALVA TAVARES   	PROCEDURE DATE:  02/20/2023    	INTERPRETATION:  Clinical History / Reason for exam: Trauma  	Technique: Single view of AP pelvis obtained.  	Comparison: Radiograph dated 12/16/2021  	Findings/  	impression  	Bony demineralization. No definitive evidence of acute displaced   	fracture. Contrast in the left ureter and bladder.  	--- End of Report ---  	SHALONDA CUEVAS MD; Attending Radiologist  	This document has been electronically signed. Feb 20 2023 10:29AM           GORDON MERINO  87y  Male    Subjective  Patient is an 87-year-old male with pmhx significant for hypertension, dyslipidemia, CAD, CHF, CKD stage III, BPH, Alzheimer's dementia, anxiety and A-fib on Eliquis presents for unwitnessed fall. Fall was unwitnessed, patient was found on the floor and started complaining of back pain, for which patient was referred to ED for further work up.     INTERVAL HPI/OVERNIGHT EVENTS: Pt resting comfortably in bed, not in acute distress, no complaints during examination. Pt remains alert and oriented only to self.    Objective:    VITALS:  T(C): 33.9 (02-21-23 @ 04:30), Max: 36.5 (02-20-23 @ 09:44)  HR: 101 (02-21-23 @ 06:29) (82 - 120)  BP: 92/56 (02-21-23 @ 06:29) (81/52 - 128/60)  RR: 18 (02-21-23 @ 06:29) (18 - 18)  SpO2: 99% (02-21-23 @ 06:29) (95% - 100%)  Wt(kg): --Vital Signs Last 24 Hrs  T(C): 33.9 (21 Feb 2023 04:30), Max: 36.5 (20 Feb 2023 09:44)  T(F): 93.1 (21 Feb 2023 04:30), Max: 97.7 (20 Feb 2023 09:44)  HR: 101 (21 Feb 2023 06:29) (82 - 120)  BP: 92/56 (21 Feb 2023 06:29) (81/52 - 128/60)  BP(mean): --  RR: 18 (21 Feb 2023 06:29) (18 - 18)  SpO2: 99% (21 Feb 2023 06:29) (95% - 100%)    Parameters below as of 21 Feb 2023 06:29  Patient On (Oxygen Delivery Method): 3L O2 Via NC    Orthostatic Blood Pressure/Pulse  Orthostatic BP Lying Systolic: 105 mm Hg  Orthostatic BP Lying Diastolic (mm Hg): 68 mm Hg  Orthostatic Pulse Heart Rate (beats/min): 79 bpm  Orthostatic BP Sitting Systolic: 108 mm Hg  Orthostatic BP Sitting Diastolic (mm Hg): 78 mm Hg  Orthostatic Pulse Heart Rate (beats/min): 133 bpm  Orthostatic BP/Pulse Site: upper right arm  Orthostatic BP/ Pulse Mode: electronic    MEDICATIONS  (STANDING):  apixaban 5 milliGRAM(s) Oral every 12 hours  donepezil 10 milliGRAM(s) Oral at bedtime  finasteride 5 milliGRAM(s) Oral daily  furosemide    Tablet 20 milliGRAM(s) Oral daily  latanoprost 0.005% Ophthalmic Solution 1 Drop(s) Both EYES at bedtime  memantine 10 milliGRAM(s) Oral two times a day  metoprolol tartrate 50 milliGRAM(s) Oral two times a day  polyethylene glycol 3350 17 Gram(s) Oral daily  QUEtiapine 25 milliGRAM(s) Oral two times a day  sacubitril 24 mG/valsartan 26 mG 1 Tablet(s) Oral two times a day  senna 2 Tablet(s) Oral at bedtime  spironolactone 25 milliGRAM(s) Oral daily  tamsulosin 0.4 milliGRAM(s) Oral at bedtime    PHYSICAL EXAM:  GENERAL: NAD, well-groomed, well-developed  HEAD:  Atraumatic, Normocephalic  NERVOUS SYSTEM:  Alert & Oriented X1, Pt alert only to self,   HEART: Heart rate irregularly irregular on exam. S1, S2, No murmurs, rubs, or gallops  ABDOMEN: Soft, Nontender, Nondistended; Bowel sounds present  EXTREMITIES:  2+ Peripheral Pulses, No clubbing, cyanosis, or edema    LABS:                     14.1   9.02  )-----------( 183      ( 20 Feb 2023 09:40 )             44.1     02-20    139  |  104  |  26<H>  ----------------------------<  117<H>  5.1<H>   |  25  |  1.3    Ca    9.7      20 Feb 2023 09:40    TPro  6.8  /  Alb  3.6  /  TBili  0.7  /  DBili  x   /  AST  19  /  ALT  14  /  AlkPhos  76  02-20    CARDIAC MARKERS ( 20 Feb 2023 21:12 )  x     / 0.01 ng/mL / x     / x     / x      CARDIAC MARKERS ( 20 Feb 2023 12:08 )  x     / 0.03 ng/mL / x     / x     / x        RADIOLOGY & ADDITIONAL TESTS:    20-Feb-2023 10:04, CT Head No Cont  CT Head No Cont:   	ACC: [id number] EXAM:  CT CERVICAL SPINE   ORDERED BY: ALVA TAVARES   	ACC: [id number] EXAM:  CT BRAIN   ORDERED BY: ALVA TAVARES   	PROCEDURE DATE:  02/20/2023    	INTERPRETATION:  CLINICAL INDICATION: Trauma  	CT BRAIN:  	TECHNIQUE:  Multiple contiguous axial images were obtained from the skull   	base to the vertex without the use of intravenous contrast. Reformatted   	coronal and sagittal images were subsequently obtained and reviewed.  	COMPARISON EXAMINATION: 12/28/2022  	FINDINGS:  	There is no CT evidence of acute transcortical infarct. Age-related   	involutional changes and chronic microvascular ischemic changes.  	  	There is no hydrocephalus, mass effect, midline shift, or acute   	intracranial hemorrhage. No extra-axial collection. Basal cisterns are   	patent.  	  	The visualized paranasal sinuses and mastoid air cells are clear.  	  	The calvarium is intact.  	  	Bilateral cataract surgery  	  	CT CERVICAL SPINE:  	TECHNIQUE:  Axial images were obtained through the cervicalspine using   	multislice helical technique.  Reformatted coronal and sagittal images   	were subsequently obtained and reviewed.  	COMPARISON EXAMINATION:  12/17/2021  	FINDINGS:  	There is no prevertebral soft tissue swelling. There is no splaying of   	the spinous processes. The occipital condyles are normal. Lateral masses   	of C1 align normally with C2. The atlantodental and atlanto-occipital   	joints are maintained. No lucent fracture line is identified. There is no   	spondylolisthesis. Facetjoint alignments are maintained.  	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 Findings include marginal osteophytes, uncovertebral spurring,   	loss of normal disc space height and endplate sclerosis, and facet joint   	space compartment narrowing with subchondral sclerosis and hypertrophic   	osteophytes at multiple levels. Canal contents are suboptimally evaluated   	inherent to CT technique.  	IMPRESSION:  	CT head: No evidence of acute transcortical infarct, acute intracranial   	hemorrhage or mass effect.  	CT cervical spine: No acute fracture or traumatic subluxation.  	  	--- End of Report ---  	CARL ANTONIO MD; Attending Radiologist  	This document has been electronically signed. Feb 20 202310:56AM    20-Feb-2023 10:09, CT Cervical Spine No Cont  CT Cervical Spine No Cont:   	ACC: [id number] EXAM:  CT CERVICAL SPINE   ORDERED BY: ALVA TAVARES   	ACC: [id number] EXAM:  CT BRAIN   ORDERED BY: ALVA TAVARES   	PROCEDURE DATE:  02/20/2023    	INTERPRETATION:  CLINICAL INDICATION: Trauma  	CT BRAIN:  	  	TECHNIQUE:  Multiple contiguous axial images were obtained from the skull   	base to the vertex without the use of intravenous contrast. Reformatted   	coronal and sagittal images were subsequently obtained and reviewed.  	COMPARISON EXAMINATION: 12/28/2022  	FINDINGS:  	There is no CT evidence of acute transcortical infarct. Age-related   	involutional changes and chronic microvascular ischemic changes.  	There is no hydrocephalus, mass effect, midline shift, or acute   	intracranial hemorrhage. No extra-axial collection. Basal cisterns are   	patent.  	The visualized paranasal sinuses and mastoid air cells are clear.  	The calvarium is intact.  	Bilateral cataract surgery.  	CT CERVICAL SPINE:  	TECHNIQUE:  Axial images were obtained through the cervicalspine using   	multislice helical technique.  Reformatted coronal and sagittal images   	were subsequently obtained and reviewed.  	COMPARISON EXAMINATION:  12/17/2021  	FINDINGS:  	There is no prevertebral soft tissue swelling. There is no splaying of   	the spinous processes. The occipital condyles are normal. Lateral masses   	of C1 align normally with C2. The atlantodental and atlanto-occipital   	joints are maintained. No lucent fracture line is identified. There is no   	spondylolisthesis. Facetjoint alignments are maintained.  	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 Findings include marginal osteophytes, uncovertebral spurring,   	loss of normal disc space height and endplate sclerosis, and facet joint   	space compartment narrowing with subchondral sclerosis and hypertrophic   	osteophytes at multiple levels. Canal contents are suboptimally evaluated   	inherent to CT technique.  	IMPRESSION:  	CT head: No evidence of acute transcortical infarct, acute intracranial   	hemorrhage or mass effect.  	CT cervical spine: No acute fracture or traumatic subluxation.  	--- End of Report ---  	CARL ANTONIO MD; Attending Radiologist  	This document has been electronically signed. Feb 20 202310:56AM  X-Ray, Fluoroscopy:    20-Feb-2023 10:04, CT Head No Cont  PACS Image: Image(s) Available    20-Feb-2023 10:09, CT Cervical Spine No Cont  PACS Image: Image(s) Available    20-Feb-2023 10:15, Xray Chest 1 View AP/PA  PACS Image: Image(s) Available  Xray Chest 1 View AP/PA:   	ACC: [id number] EXAM:  XR CHEST 1 VIEW   ORDERED BY: [employer]   	PROCEDURE DATE:  02/20/2023    	INTERPRETATION:  STUDY INDICATION: Trauma Code  	Comparison: 1/5/2023.  	Technique/Positioning: Frontal view of the chest were obtained.  	Findings:  	Support devices: None.  	Cardiac/mediastinum/hilum: Within normal limits.  	Lung parenchyma/Pleura: No consolidation, effusion or pneumothorax. Right   	apical nodular opacity.  	Skeleton/soft tissues:  No radiographically evident acutedisplaced   	fracture within the limitations of this exam.  	Impression:  	No acute abnormality on frontal chest radiograph.  	--- End of Report ---  	MAMIE TAYLOR MD; Attending Radiologist  	This document has been electronically signed. Feb20 2023  2:00PM    20-Feb-2023 10:16, Xray Pelvis AP only  PACS Image: Image(s) Available  Xray Pelvis AP only:   	ACC: [id number] EXAM:  XR PELVIS AP ONLY 1-2 VIEWS   ORDERED BY: [employer]   	PROCEDURE DATE:  02/20/2023    	INTERPRETATION:  Clinical History / Reason for exam: Trauma  	Technique: Single view of AP pelvis obtained.  	Comparison: Radiograph dated 12/16/2021  	Findings/  	impression  	Bony demineralization. No definitive evidence of acute displaced   	fracture. Contrast in the left ureter and bladder.  	--- End of Report ---  	SHALONDA CUEVAS MD; Attending Radiologist  	This document has been electronically signed. Feb 20 2023 10:29AM

## 2023-02-22 ENCOUNTER — APPOINTMENT (OUTPATIENT)
Dept: CARDIOLOGY | Facility: CLINIC | Age: 88
End: 2023-02-22

## 2023-02-22 DIAGNOSIS — R60.0 LOCALIZED EDEMA: ICD-10-CM

## 2023-02-22 DIAGNOSIS — Z66 DO NOT RESUSCITATE: ICD-10-CM

## 2023-02-22 DIAGNOSIS — I34.0 NONRHEUMATIC MITRAL (VALVE) INSUFFICIENCY: ICD-10-CM

## 2023-02-22 DIAGNOSIS — I48.91 UNSPECIFIED ATRIAL FIBRILLATION: ICD-10-CM

## 2023-02-22 DIAGNOSIS — I50.20 UNSPECIFIED SYSTOLIC (CONGESTIVE) HEART FAILURE: ICD-10-CM

## 2023-02-22 DIAGNOSIS — Z86.79 PERSONAL HISTORY OF OTHER DISEASES OF THE CIRCULATORY SYSTEM: ICD-10-CM

## 2023-02-22 DIAGNOSIS — F02.80 ALZHEIMER'S DISEASE, UNSPECIFIED: ICD-10-CM

## 2023-02-22 DIAGNOSIS — Z86.69 PERSONAL HISTORY OF OTHER DISEASES OF THE NERVOUS SYSTEM AND SENSE ORGANS: ICD-10-CM

## 2023-02-22 DIAGNOSIS — Z87.438 PERSONAL HISTORY OF OTHER DISEASES OF MALE GENITAL ORGANS: ICD-10-CM

## 2023-02-22 DIAGNOSIS — G30.9 ALZHEIMER'S DISEASE, UNSPECIFIED: ICD-10-CM

## 2023-02-22 LAB
ALBUMIN SERPL ELPH-MCNC: 3.1 G/DL — LOW (ref 3.5–5.2)
ALP SERPL-CCNC: 70 U/L — SIGNIFICANT CHANGE UP (ref 30–115)
ALT FLD-CCNC: 14 U/L — SIGNIFICANT CHANGE UP (ref 0–41)
ANION GAP SERPL CALC-SCNC: 10 MMOL/L — SIGNIFICANT CHANGE UP (ref 7–14)
AST SERPL-CCNC: 19 U/L — SIGNIFICANT CHANGE UP (ref 0–41)
BASOPHILS # BLD AUTO: 0.03 K/UL — SIGNIFICANT CHANGE UP (ref 0–0.2)
BASOPHILS NFR BLD AUTO: 0.5 % — SIGNIFICANT CHANGE UP (ref 0–1)
BILIRUB SERPL-MCNC: 0.6 MG/DL — SIGNIFICANT CHANGE UP (ref 0.2–1.2)
BUN SERPL-MCNC: 13 MG/DL — SIGNIFICANT CHANGE UP (ref 10–20)
CALCIUM SERPL-MCNC: 8.7 MG/DL — SIGNIFICANT CHANGE UP (ref 8.4–10.5)
CHLORIDE SERPL-SCNC: 107 MMOL/L — SIGNIFICANT CHANGE UP (ref 98–110)
CO2 SERPL-SCNC: 22 MMOL/L — SIGNIFICANT CHANGE UP (ref 17–32)
CREAT SERPL-MCNC: 1 MG/DL — SIGNIFICANT CHANGE UP (ref 0.7–1.5)
EGFR: 73 ML/MIN/1.73M2 — SIGNIFICANT CHANGE UP
EOSINOPHIL # BLD AUTO: 0.53 K/UL — SIGNIFICANT CHANGE UP (ref 0–0.7)
EOSINOPHIL NFR BLD AUTO: 9 % — HIGH (ref 0–8)
GLUCOSE SERPL-MCNC: 103 MG/DL — HIGH (ref 70–99)
HCT VFR BLD CALC: 43.5 % — SIGNIFICANT CHANGE UP (ref 42–52)
HGB BLD-MCNC: 14 G/DL — SIGNIFICANT CHANGE UP (ref 14–18)
IMM GRANULOCYTES NFR BLD AUTO: 0.3 % — SIGNIFICANT CHANGE UP (ref 0.1–0.3)
LYMPHOCYTES # BLD AUTO: 1.36 K/UL — SIGNIFICANT CHANGE UP (ref 1.2–3.4)
LYMPHOCYTES # BLD AUTO: 23.1 % — SIGNIFICANT CHANGE UP (ref 20.5–51.1)
MAGNESIUM SERPL-MCNC: 1.9 MG/DL — SIGNIFICANT CHANGE UP (ref 1.8–2.4)
MCHC RBC-ENTMCNC: 28.3 PG — SIGNIFICANT CHANGE UP (ref 27–31)
MCHC RBC-ENTMCNC: 32.2 G/DL — SIGNIFICANT CHANGE UP (ref 32–37)
MCV RBC AUTO: 87.9 FL — SIGNIFICANT CHANGE UP (ref 80–94)
MONOCYTES # BLD AUTO: 0.84 K/UL — HIGH (ref 0.1–0.6)
MONOCYTES NFR BLD AUTO: 14.2 % — HIGH (ref 1.7–9.3)
NEUTROPHILS # BLD AUTO: 3.12 K/UL — SIGNIFICANT CHANGE UP (ref 1.4–6.5)
NEUTROPHILS NFR BLD AUTO: 52.9 % — SIGNIFICANT CHANGE UP (ref 42.2–75.2)
NRBC # BLD: 0 /100 WBCS — SIGNIFICANT CHANGE UP (ref 0–0)
PLATELET # BLD AUTO: 164 K/UL — SIGNIFICANT CHANGE UP (ref 130–400)
POTASSIUM SERPL-MCNC: 4.2 MMOL/L — SIGNIFICANT CHANGE UP (ref 3.5–5)
POTASSIUM SERPL-SCNC: 4.2 MMOL/L — SIGNIFICANT CHANGE UP (ref 3.5–5)
PROT SERPL-MCNC: 5.9 G/DL — LOW (ref 6–8)
RBC # BLD: 4.95 M/UL — SIGNIFICANT CHANGE UP (ref 4.7–6.1)
RBC # FLD: 16.8 % — HIGH (ref 11.5–14.5)
SODIUM SERPL-SCNC: 139 MMOL/L — SIGNIFICANT CHANGE UP (ref 135–146)
WBC # BLD: 5.9 K/UL — SIGNIFICANT CHANGE UP (ref 4.8–10.8)
WBC # FLD AUTO: 5.9 K/UL — SIGNIFICANT CHANGE UP (ref 4.8–10.8)

## 2023-02-22 PROCEDURE — 99233 SBSQ HOSP IP/OBS HIGH 50: CPT

## 2023-02-22 RX ORDER — METOPROLOL TARTRATE 50 MG
25 TABLET ORAL
Refills: 0 | Status: DISCONTINUED | OUTPATIENT
Start: 2023-02-22 | End: 2023-02-23

## 2023-02-22 RX ORDER — SODIUM CHLORIDE 9 MG/ML
250 INJECTION, SOLUTION INTRAVENOUS ONCE
Refills: 0 | Status: COMPLETED | OUTPATIENT
Start: 2023-02-22 | End: 2023-02-22

## 2023-02-22 RX ORDER — CHLORHEXIDINE GLUCONATE 213 G/1000ML
1 SOLUTION TOPICAL DAILY
Refills: 0 | Status: DISCONTINUED | OUTPATIENT
Start: 2023-02-22 | End: 2023-02-22

## 2023-02-22 RX ORDER — MIDODRINE HYDROCHLORIDE 2.5 MG/1
10 TABLET ORAL THREE TIMES A DAY
Refills: 0 | Status: DISCONTINUED | OUTPATIENT
Start: 2023-02-22 | End: 2023-02-25

## 2023-02-22 RX ADMIN — Medication 20 MILLIGRAM(S): at 05:36

## 2023-02-22 RX ADMIN — SODIUM CHLORIDE 500 MILLILITER(S): 9 INJECTION, SOLUTION INTRAVENOUS at 13:13

## 2023-02-22 RX ADMIN — APIXABAN 5 MILLIGRAM(S): 2.5 TABLET, FILM COATED ORAL at 18:06

## 2023-02-22 RX ADMIN — MEMANTINE HYDROCHLORIDE 10 MILLIGRAM(S): 10 TABLET ORAL at 18:21

## 2023-02-22 RX ADMIN — MEMANTINE HYDROCHLORIDE 10 MILLIGRAM(S): 10 TABLET ORAL at 05:37

## 2023-02-22 RX ADMIN — MIDODRINE HYDROCHLORIDE 10 MILLIGRAM(S): 2.5 TABLET ORAL at 18:06

## 2023-02-22 RX ADMIN — DONEPEZIL HYDROCHLORIDE 10 MILLIGRAM(S): 10 TABLET, FILM COATED ORAL at 23:00

## 2023-02-22 RX ADMIN — SENNA PLUS 2 TABLET(S): 8.6 TABLET ORAL at 23:00

## 2023-02-22 RX ADMIN — TAMSULOSIN HYDROCHLORIDE 0.4 MILLIGRAM(S): 0.4 CAPSULE ORAL at 22:00

## 2023-02-22 RX ADMIN — Medication 50 MILLIGRAM(S): at 05:37

## 2023-02-22 RX ADMIN — SODIUM CHLORIDE 500 MILLILITER(S): 9 INJECTION, SOLUTION INTRAVENOUS at 15:31

## 2023-02-22 RX ADMIN — MIDODRINE HYDROCHLORIDE 10 MILLIGRAM(S): 2.5 TABLET ORAL at 13:11

## 2023-02-22 RX ADMIN — QUETIAPINE FUMARATE 25 MILLIGRAM(S): 200 TABLET, FILM COATED ORAL at 05:38

## 2023-02-22 RX ADMIN — SPIRONOLACTONE 25 MILLIGRAM(S): 25 TABLET, FILM COATED ORAL at 05:38

## 2023-02-22 RX ADMIN — SACUBITRIL AND VALSARTAN 1 TABLET(S): 24; 26 TABLET, FILM COATED ORAL at 05:35

## 2023-02-22 RX ADMIN — APIXABAN 5 MILLIGRAM(S): 2.5 TABLET, FILM COATED ORAL at 05:36

## 2023-02-22 NOTE — MEDICAL STUDENT PROGRESS NOTE(EDUCATION) - NS MD HP STUD ASPLAN PLAN FT
#Unwitnessed Fall 2/2 Mechanical vs Syncope  #r/o ACS  #H/o HFrEF  -Head trauma ?, Loss of consciousness ?, Anti-coagulation -ve  -Trauma work up: CT head, cervical spine, chest, abdomen and pelvis shows no acute traumatic event- left renal exophytic mass observed- OP follow up  -continue monitoring on tele  -Fall precaution  -Trops 0.03 -> 0.01  -EKG shows Afib, LVH and wide QRS- same as before  -c/w Eliquis  -Repeat Orthostatic Vitals today (2/22)  -F/u TTE  -PT eval    #Atrial fibrillation   [id number]: 4 (left systolic dysfunction, HTN, age)   - Eliquis- readjust dose if needed after weight is obtained  - cont metoprolol    #Severe MVR with systolic CHF- NOT in exacerbation  - ECHO EF 27 % with severe MVR from 12/2022  - Patient was supposed to follow OP with cardiology for mitraclip, Not sure if he followed, no OP cards note in [facility]  - daily weights   - c/w Metoprolol, Entresto, Aldactone  - Cardiology c/s if TTE shows decrease in EF    #BPH   - c/w Tamsulosin and Finasteride    #Glaucoma   - cont home eye drops     #Misc  -Routine Lab frequency: Limit routine labs  -DVT prophylaxis: Eliquis  -GI prophylaxis: None  -Diet: DASH, pureed- pending S&S  -Code status: DNR/DNI  -Activity: IAT  -Bowel regimen: Senna, Miralax  -Urinary catheter: Incontinent  -Dispo: Tele    -Med rec confirmed with nursing home charts

## 2023-02-22 NOTE — MEDICAL STUDENT PROGRESS NOTE(EDUCATION) - SUBJECTIVE AND OBJECTIVE BOX
GORDON MERINO  87y  Male    Subjective  Patient is an 87-year-old male with pmhx significant for hypertension, dyslipidemia, CAD, CHF, CKD stage III, BPH, Alzheimer's dementia, anxiety and A-fib on Eliquis presents for unwitnessed fall. Fall was unwitnessed, patient was found on the floor and started complaining of back pain, for which patient was referred to ED for further work up.     INTERVAL HPI/OVERNIGHT EVENTS: Pt placed on 1:1 for agitation and attempting to get out of bed. Per pt's sitter, pt has been asleep overnight. On exam pt resting comfortably in bed, not in acute distress, no complaints during examination. Pt remains alert and oriented only to self.    Objective:  PAST MEDICAL & SURGICAL HISTORY:  Alzheimer disease  Glaucoma  Enlarged prostate  Hyperlipidemia  History of hernia repair  History of cataract surgery    Vitals:  T(C): 36.1 (22 Feb 2023 05:08), Max: 36.1 (22 Feb 2023 05:08)  T(F): 97 (22 Feb 2023 05:08), Max: 97 (22 Feb 2023 05:08)  HR: 92 (22 Feb 2023 05:08) (92 - 99)  BP: 91/62 (22 Feb 2023 05:08) (91/62 - 113/72)  BP(mean): --  RR: 18 (22 Feb 2023 05:08) (17 - 18)  SpO2: --    MEDICATIONS  (STANDING):  apixaban 5 milliGRAM(s) Oral every 12 hours  donepezil 10 milliGRAM(s) Oral at bedtime  finasteride 5 milliGRAM(s) Oral daily  furosemide    Tablet 20 milliGRAM(s) Oral daily  latanoprost 0.005% Ophthalmic Solution 1 Drop(s) Both EYES at bedtime  memantine 10 milliGRAM(s) Oral two times a day  metoprolol tartrate 50 milliGRAM(s) Oral two times a day  polyethylene glycol 3350 17 Gram(s) Oral daily  QUEtiapine 25 milliGRAM(s) Oral two times a day  sacubitril 24 mG/valsartan 26 mG 1 Tablet(s) Oral two times a day  senna 2 Tablet(s) Oral at bedtime  spironolactone 25 milliGRAM(s) Oral daily  tamsulosin 0.4 milliGRAM(s) Oral at bedtime    MEDICATIONS  (PRN):    PHYSICAL EXAM:  GENERAL: NAD, well-groomed, well-developed  HEAD:  Atraumatic, Normocephalic  NERVOUS SYSTEM:  Alert & Oriented X1, Pt alert only to self,   HEART: Heart rate irregularly irregular on exam. S1, S2, No murmurs, rubs, or gallops  ABDOMEN: Soft, Nontender, Nondistended; Bowel sounds present  EXTREMITIES:  2+ Peripheral Pulses, No clubbing, cyanosis, or edema    LABS:                     14.1   9.02  )-----------( 183      ( 20 Feb 2023 09:40 )             44.1     02-20  139  |  104  |  26<H>  ----------------------------<  117<H>  5.1<H>   |  25  |  1.3  Ca    9.7      20 Feb 2023 09:40  TPro  6.8  /  Alb  3.6  /  TBili  0.7  /  DBili  x   /  AST  19  /  ALT  14  /  AlkPhos  76  02-20  CARDIAC MARKERS ( 20 Feb 2023 21:12 )  x     / 0.01 ng/mL / x     / x     / x      CARDIAC MARKERS ( 20 Feb 2023 12:08 )  x     / 0.03 ng/mL / x     / x     / x        RADIOLOGY & ADDITIONAL TESTS:    20-Feb-2023 10:04, CT Head No Cont  CT Head No Cont:   	ACC: [id number] EXAM:  CT CERVICAL SPINE   ORDERED BY: ALVA TAVARES   	ACC: [id number] EXAM:  CT BRAIN   ORDERED BY: ALVA TAVARES   	PROCEDURE DATE:  02/20/2023    	INTERPRETATION:  CLINICAL INDICATION: Trauma  	CT BRAIN:  	TECHNIQUE:  Multiple contiguous axial images were obtained from the skull   	base to the vertex without the use of intravenous contrast. Reformatted   	coronal and sagittal images were subsequently obtained and reviewed.  	COMPARISON EXAMINATION: 12/28/2022  	FINDINGS:  	There is no CT evidence of acute transcortical infarct. Age-related   	involutional changes and chronic microvascular ischemic changes.  	  	There is no hydrocephalus, mass effect, midline shift, or acute   	intracranial hemorrhage. No extra-axial collection. Basal cisterns are   	patent.  	  	The visualized paranasal sinuses and mastoid air cells are clear.  	  	The calvarium is intact.  	  	Bilateral cataract surgery  	  	CT CERVICAL SPINE:  	TECHNIQUE:  Axial images were obtained through the cervicalspine using   	multislice helical technique.  Reformatted coronal and sagittal images   	were subsequently obtained and reviewed.  	COMPARISON EXAMINATION:  12/17/2021  	FINDINGS:  	There is no prevertebral soft tissue swelling. There is no splaying of   	the spinous processes. The occipital condyles are normal. Lateral masses   	of C1 align normally with C2. The atlantodental and atlanto-occipital   	joints are maintained. No lucent fracture line is identified. There is no   	spondylolisthesis. Facetjoint alignments are maintained.  	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 Findings include marginal osteophytes, uncovertebral spurring,   	loss of normal disc space height and endplate sclerosis, and facet joint   	space compartment narrowing with subchondral sclerosis and hypertrophic   	osteophytes at multiple levels. Canal contents are suboptimally evaluated   	inherent to CT technique.  	IMPRESSION:  	CT head: No evidence of acute transcortical infarct, acute intracranial   	hemorrhage or mass effect.  	CT cervical spine: No acute fracture or traumatic subluxation.  	  	--- End of Report ---  	CARL ANOTNIO MD; Attending Radiologist  	This document has been electronically signed. Feb 20 202310:56AM    20-Feb-2023 10:09, CT Cervical Spine No Cont  CT Cervical Spine No Cont:   	ACC: [id number] EXAM:  CT CERVICAL SPINE   ORDERED BY: ALVA TAVARES   	ACC: [id number] EXAM:  CT BRAIN   ORDERED BY: ALVA TAVARES   	PROCEDURE DATE:  02/20/2023    	INTERPRETATION:  CLINICAL INDICATION: Trauma  	CT BRAIN:  	  	TECHNIQUE:  Multiple contiguous axial images were obtained from the skull   	base to the vertex without the use of intravenous contrast. Reformatted   	coronal and sagittal images were subsequently obtained and reviewed.  	COMPARISON EXAMINATION: 12/28/2022  	FINDINGS:  	There is no CT evidence of acute transcortical infarct. Age-related   	involutional changes and chronic microvascular ischemic changes.  	There is no hydrocephalus, mass effect, midline shift, or acute   	intracranial hemorrhage. No extra-axial collection. Basal cisterns are   	patent.  	The visualized paranasal sinuses and mastoid air cells are clear.  	The calvarium is intact.  	Bilateral cataract surgery.  	CT CERVICAL SPINE:  	TECHNIQUE:  Axial images were obtained through the cervicalspine using   	multislice helical technique.  Reformatted coronal and sagittal images   	were subsequently obtained and reviewed.  	COMPARISON EXAMINATION:  12/17/2021  	FINDINGS:  	There is no prevertebral soft tissue swelling. There is no splaying of   	the spinous processes. The occipital condyles are normal. Lateral masses   	of C1 align normally with C2. The atlantodental and atlanto-occipital   	joints are maintained. No lucent fracture line is identified. There is no   	spondylolisthesis. Facetjoint alignments are maintained.  	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 Findings include marginal osteophytes, uncovertebral spurring,   	loss of normal disc space height and endplate sclerosis, and facet joint   	space compartment narrowing with subchondral sclerosis and hypertrophic   	osteophytes at multiple levels. Canal contents are suboptimally evaluated   	inherent to CT technique.  	IMPRESSION:  	CT head: No evidence of acute transcortical infarct, acute intracranial   	hemorrhage or mass effect.  	CT cervical spine: No acute fracture or traumatic subluxation.  	--- End of Report ---  	CARL ANTONIO MD; Attending Radiologist  	This document has been electronically signed. Feb 20 202310:56AM  X-Ray, Fluoroscopy:    20-Feb-2023 10:04, CT Head No Cont  PACS Image: Image(s) Available    20-Feb-2023 10:09, CT Cervical Spine No Cont  PACS Image: Image(s) Available    20-Feb-2023 10:15, Xray Chest 1 View AP/PA  PACS Image: Image(s) Available  Xray Chest 1 View AP/PA:   	ACC: [id number] EXAM:  XR CHEST 1 VIEW   ORDERED BY: ALVA TAVARES   	PROCEDURE DATE:  02/20/2023    	INTERPRETATION:  STUDY INDICATION: Trauma Code  	Comparison: 1/5/2023.  	Technique/Positioning: Frontal view of the chest were obtained.  	Findings:  	Support devices: None.  	Cardiac/mediastinum/hilum: Within normal limits.  	Lung parenchyma/Pleura: No consolidation, effusion or pneumothorax. Right   	apical nodular opacity.  	Skeleton/soft tissues:  No radiographically evident acutedisplaced   	fracture within the limitations of this exam.  	Impression:  	No acute abnormality on frontal chest radiograph.  	--- End of Report ---  	MAMIE TAYLOR MD; Attending Radiologist  	This document has been electronically signed. Feb20 2023  2:00PM    20-Feb-2023 10:16, Xray Pelvis AP only  PACS Image: Image(s) Available  Xray Pelvis AP only:   	ACC: [id number] EXAM:  XR PELVIS AP ONLY 1-2 VIEWS   ORDERED BY: ALVA TAVARES   	PROCEDURE DATE:  02/20/2023    	INTERPRETATION:  Clinical History / Reason for exam: Trauma  	Technique: Single view of AP pelvis obtained.  	Comparison: Radiograph dated 12/16/2021  	Findings/  	impression  	Bony demineralization. No definitive evidence of acute displaced   	fracture. Contrast in the left ureter and bladder.  	--- End of Report ---  	SHALONDA CUEVAS MD; Attending Radiologist  	This document has been electronically signed. Feb 20 2023 10:29AM

## 2023-02-22 NOTE — PROGRESS NOTE ADULT - ASSESSMENT
86 y/o man with PMH of HTN, hyperlipidemia, ?CAD, HFrEF, CKD 3, BPH, Alzheimer's dementia, Afib recently diagnosed in 12/22 and on Eliquis and anxiety presented to the ED from the NH for an unwitnessed fall. Pt is an unreliable historian due to dementia. Per chart, pt was found on the floor and complained of back pain and brought to the ED.     1. s/p unwitnessed fall - mechanical vs syncope  fall precautions  continue telemetry  staff unable to obtain orthostatics while standing on 2/21  pt now   YANN stockings  PT consulted  trop 0.03 -> 0.01  EKG reviewed and interpreted by me: Afib with vent rate 99 and LAD  repeat ECHO (EF 27%, severe MR in Dec 2022 and plan for outpt eval for Mitraclip)  cardio eval     2. Chronic Afib   - on lopressor and Eliquis    3. Chronic HFrEF - pt appears euvolemic - possibly tachy induced per chart review  on Entresto, spironolactone, metoprolol  repeat ECHO    4. CKD stage 3 -  stable at baseline    5. BPH - on flomax and finasteride  Glaucoma - on home eye drops     DNR/DNI status  high risk pt    PROGRESS NOTE HANDOFF    Pending: orthostatics in AM, ECHO, continue PT    Family discussion: resident updated family today    Disposition: SNF 88 y/o man with PMH of HTN, hyperlipidemia, ?CAD, HFrEF, CKD 3, BPH, Alzheimer's dementia, Afib recently diagnosed in 12/22 and on Eliquis and anxiety presented to the ED from the NH for an unwitnessed fall. Pt is an unreliable historian due to dementia. Per chart, pt was found on the floor and complained of back pain and brought to the ED.     1. s/p unwitnessed fall - mechanical vs syncope  fall precautions  continue telemetry  staff unable to obtain orthostatics while standing on 2/21  pt now hypotensive and started on midodrine 10mg tid and given bolus of 250cc  furosemide stopped  metoprolol decreased to 25mg q12hr - hold for SBP <90  hold Entresto for SBP <90  will make other adjustments as needed  YANN stockings  PT consulted  trop 0.03 -> 0.01  EKG reviewed and interpreted by me: Afib with vent rate 99 and LAD  repeat ECHO (EF 27%, severe MR in Dec 2022 and plan for outpt eval for Mitraclip)  cardio eval after ECHO is done    2. Chronic Afib   - on lopressor and Eliquis    3. Chronic HFrEF - pt is not volume overloaded  hold lasix for now and monitor   possibly tachy induced per chart review  on Entresto, spironolactone, metoprolol (with parameters)  repeat ECHO    4. CKD stage 3 -  stable at baseline    5. BPH - on flomax and finasteride  Glaucoma - on home eye drops     DNR/DNI status  high risk pt    PROGRESS NOTE HANDOFF    Pending: BP monitoring on midodrine, ECHO, resume PT when BP is stabilized    Family discussion: resident updated family today    Disposition: SNF

## 2023-02-23 LAB
ALBUMIN SERPL ELPH-MCNC: 3 G/DL — LOW (ref 3.5–5.2)
ALP SERPL-CCNC: 70 U/L — SIGNIFICANT CHANGE UP (ref 30–115)
ALT FLD-CCNC: 15 U/L — SIGNIFICANT CHANGE UP (ref 0–41)
ANION GAP SERPL CALC-SCNC: 9 MMOL/L — SIGNIFICANT CHANGE UP (ref 7–14)
AST SERPL-CCNC: 19 U/L — SIGNIFICANT CHANGE UP (ref 0–41)
BASOPHILS # BLD AUTO: 0.04 K/UL — SIGNIFICANT CHANGE UP (ref 0–0.2)
BASOPHILS NFR BLD AUTO: 0.6 % — SIGNIFICANT CHANGE UP (ref 0–1)
BILIRUB SERPL-MCNC: 0.6 MG/DL — SIGNIFICANT CHANGE UP (ref 0.2–1.2)
BUN SERPL-MCNC: 13 MG/DL — SIGNIFICANT CHANGE UP (ref 10–20)
CALCIUM SERPL-MCNC: 8.8 MG/DL — SIGNIFICANT CHANGE UP (ref 8.4–10.4)
CHLORIDE SERPL-SCNC: 104 MMOL/L — SIGNIFICANT CHANGE UP (ref 98–110)
CO2 SERPL-SCNC: 24 MMOL/L — SIGNIFICANT CHANGE UP (ref 17–32)
CREAT SERPL-MCNC: 1.2 MG/DL — SIGNIFICANT CHANGE UP (ref 0.7–1.5)
CRP SERPL-MCNC: 3.1 MG/L — SIGNIFICANT CHANGE UP
EGFR: 59 ML/MIN/1.73M2 — LOW
EOSINOPHIL # BLD AUTO: 0.57 K/UL — SIGNIFICANT CHANGE UP (ref 0–0.7)
EOSINOPHIL NFR BLD AUTO: 8.7 % — HIGH (ref 0–8)
ERYTHROCYTE [SEDIMENTATION RATE] IN BLOOD: 10 MM/HR — SIGNIFICANT CHANGE UP (ref 0–10)
GLUCOSE SERPL-MCNC: 108 MG/DL — HIGH (ref 70–99)
HCT VFR BLD CALC: 43.8 % — SIGNIFICANT CHANGE UP (ref 42–52)
HGB BLD-MCNC: 14.3 G/DL — SIGNIFICANT CHANGE UP (ref 14–18)
IMM GRANULOCYTES NFR BLD AUTO: 0.3 % — SIGNIFICANT CHANGE UP (ref 0.1–0.3)
LACTATE SERPL-SCNC: 2.4 MMOL/L — HIGH (ref 0.7–2)
LACTATE SERPL-SCNC: 2.5 MMOL/L — HIGH (ref 0.7–2)
LYMPHOCYTES # BLD AUTO: 1.51 K/UL — SIGNIFICANT CHANGE UP (ref 1.2–3.4)
LYMPHOCYTES # BLD AUTO: 23.1 % — SIGNIFICANT CHANGE UP (ref 20.5–51.1)
MAGNESIUM SERPL-MCNC: 1.8 MG/DL — SIGNIFICANT CHANGE UP (ref 1.8–2.4)
MCHC RBC-ENTMCNC: 28.7 PG — SIGNIFICANT CHANGE UP (ref 27–31)
MCHC RBC-ENTMCNC: 32.6 G/DL — SIGNIFICANT CHANGE UP (ref 32–37)
MCV RBC AUTO: 88 FL — SIGNIFICANT CHANGE UP (ref 80–94)
MONOCYTES # BLD AUTO: 0.77 K/UL — HIGH (ref 0.1–0.6)
MONOCYTES NFR BLD AUTO: 11.8 % — HIGH (ref 1.7–9.3)
NEUTROPHILS # BLD AUTO: 3.64 K/UL — SIGNIFICANT CHANGE UP (ref 1.4–6.5)
NEUTROPHILS NFR BLD AUTO: 55.5 % — SIGNIFICANT CHANGE UP (ref 42.2–75.2)
NRBC # BLD: 0 /100 WBCS — SIGNIFICANT CHANGE UP (ref 0–0)
PLATELET # BLD AUTO: 192 K/UL — SIGNIFICANT CHANGE UP (ref 130–400)
POTASSIUM SERPL-MCNC: 4.4 MMOL/L — SIGNIFICANT CHANGE UP (ref 3.5–5)
POTASSIUM SERPL-SCNC: 4.4 MMOL/L — SIGNIFICANT CHANGE UP (ref 3.5–5)
PROT SERPL-MCNC: 5.9 G/DL — LOW (ref 6–8)
RBC # BLD: 4.98 M/UL — SIGNIFICANT CHANGE UP (ref 4.7–6.1)
RBC # FLD: 16.8 % — HIGH (ref 11.5–14.5)
SODIUM SERPL-SCNC: 137 MMOL/L — SIGNIFICANT CHANGE UP (ref 135–146)
WBC # BLD: 6.55 K/UL — SIGNIFICANT CHANGE UP (ref 4.8–10.8)
WBC # FLD AUTO: 6.55 K/UL — SIGNIFICANT CHANGE UP (ref 4.8–10.8)

## 2023-02-23 PROCEDURE — 99233 SBSQ HOSP IP/OBS HIGH 50: CPT

## 2023-02-23 PROCEDURE — 99222 1ST HOSP IP/OBS MODERATE 55: CPT

## 2023-02-23 PROCEDURE — 93306 TTE W/DOPPLER COMPLETE: CPT | Mod: 26

## 2023-02-23 RX ORDER — SODIUM CHLORIDE 9 MG/ML
250 INJECTION, SOLUTION INTRAVENOUS ONCE
Refills: 0 | Status: COMPLETED | OUTPATIENT
Start: 2023-02-23 | End: 2023-02-23

## 2023-02-23 RX ORDER — NOREPINEPHRINE BITARTRATE/D5W 8 MG/250ML
0.02 PLASTIC BAG, INJECTION (ML) INTRAVENOUS
Qty: 8 | Refills: 0 | Status: DISCONTINUED | OUTPATIENT
Start: 2023-02-23 | End: 2023-02-23

## 2023-02-23 RX ORDER — NOREPINEPHRINE BITARTRATE/D5W 8 MG/250ML
0.04 PLASTIC BAG, INJECTION (ML) INTRAVENOUS
Qty: 8 | Refills: 0 | Status: DISCONTINUED | OUTPATIENT
Start: 2023-02-23 | End: 2023-02-25

## 2023-02-23 RX ADMIN — DONEPEZIL HYDROCHLORIDE 10 MILLIGRAM(S): 10 TABLET, FILM COATED ORAL at 21:22

## 2023-02-23 RX ADMIN — SODIUM CHLORIDE 500 MILLILITER(S): 9 INJECTION, SOLUTION INTRAVENOUS at 14:00

## 2023-02-23 RX ADMIN — SODIUM CHLORIDE 500 MILLILITER(S): 9 INJECTION, SOLUTION INTRAVENOUS at 13:00

## 2023-02-23 RX ADMIN — MEMANTINE HYDROCHLORIDE 10 MILLIGRAM(S): 10 TABLET ORAL at 17:35

## 2023-02-23 RX ADMIN — SACUBITRIL AND VALSARTAN 1 TABLET(S): 24; 26 TABLET, FILM COATED ORAL at 05:11

## 2023-02-23 RX ADMIN — Medication 25 MILLIGRAM(S): at 05:52

## 2023-02-23 RX ADMIN — Medication 3.09 MICROGRAM(S)/KG/MIN: at 15:57

## 2023-02-23 RX ADMIN — SENNA PLUS 2 TABLET(S): 8.6 TABLET ORAL at 21:22

## 2023-02-23 RX ADMIN — MIDODRINE HYDROCHLORIDE 10 MILLIGRAM(S): 2.5 TABLET ORAL at 17:36

## 2023-02-23 RX ADMIN — LATANOPROST 1 DROP(S): 0.05 SOLUTION/ DROPS OPHTHALMIC; TOPICAL at 21:34

## 2023-02-23 RX ADMIN — MIDODRINE HYDROCHLORIDE 10 MILLIGRAM(S): 2.5 TABLET ORAL at 05:10

## 2023-02-23 RX ADMIN — FINASTERIDE 5 MILLIGRAM(S): 5 TABLET, FILM COATED ORAL at 12:04

## 2023-02-23 RX ADMIN — LATANOPROST 1 DROP(S): 0.05 SOLUTION/ DROPS OPHTHALMIC; TOPICAL at 08:00

## 2023-02-23 RX ADMIN — APIXABAN 5 MILLIGRAM(S): 2.5 TABLET, FILM COATED ORAL at 05:10

## 2023-02-23 RX ADMIN — APIXABAN 5 MILLIGRAM(S): 2.5 TABLET, FILM COATED ORAL at 17:35

## 2023-02-23 RX ADMIN — QUETIAPINE FUMARATE 25 MILLIGRAM(S): 200 TABLET, FILM COATED ORAL at 17:37

## 2023-02-23 RX ADMIN — SPIRONOLACTONE 25 MILLIGRAM(S): 25 TABLET, FILM COATED ORAL at 05:11

## 2023-02-23 RX ADMIN — MIDODRINE HYDROCHLORIDE 10 MILLIGRAM(S): 2.5 TABLET ORAL at 12:04

## 2023-02-23 RX ADMIN — MEMANTINE HYDROCHLORIDE 10 MILLIGRAM(S): 10 TABLET ORAL at 05:10

## 2023-02-23 RX ADMIN — QUETIAPINE FUMARATE 25 MILLIGRAM(S): 200 TABLET, FILM COATED ORAL at 05:11

## 2023-02-23 RX ADMIN — Medication 6.19 MICROGRAM(S)/KG/MIN: at 20:50

## 2023-02-23 RX ADMIN — TAMSULOSIN HYDROCHLORIDE 0.4 MILLIGRAM(S): 0.4 CAPSULE ORAL at 21:22

## 2023-02-23 NOTE — MEDICAL STUDENT PROGRESS NOTE(EDUCATION) - SUBJECTIVE AND OBJECTIVE BOX
GORDON MERINO  87y  Male    Subjective:  Patient is an 87-year-old male with pmhx significant for hypertension, dyslipidemia, CAD, CHF, CKD stage III, BPH, Alzheimer's dementia, anxiety and A-fib on Eliquis presents for unwitnessed fall. Fall was unwitnessed, patient was found on the floor and started complaining of back pain, for which patient was referred to ED for further work up.     INTERVAL HPI/OVERNIGHT EVENTS: Per pt's sitter, pt was intermittently agitated, trying to get out of bed overnight. On exam pt calm and resting comfortably in bed, not in acute distress, no complaints during examination. Pt remains alert and oriented only to self.    Objective:  PAST MEDICAL & SURGICAL HISTORY:  Alzheimer disease  Glaucoma  Enlarged prostate  Hyperlipidemia  History of hernia repair  History of cataract surgery    Vital Signs Last 24 Hrs  T(C): 36.4 (23 Feb 2023 05:51), Max: 36.4 (23 Feb 2023 05:51)  T(F): 97.5 (23 Feb 2023 05:51), Max: 97.5 (23 Feb 2023 05:51)  HR: 95 (23 Feb 2023 05:51) (86 - 105)  BP: 101/72 (23 Feb 2023 05:51) (74/50 - 101/72)  BP(mean): --  RR: 18 (23 Feb 2023 05:51) (18 - 18)  SpO2: --    Orthostatic VS  02-21-23 @ 10:00  Lying BP: 105/68 HR: 79   Sitting BP: 108/78 HR: 133  Standing BP: --/-- HR: --  Site: upper right arm   Mode: electronic    MEDICATIONS  (STANDING):  apixaban 5 milliGRAM(s) Oral every 12 hours  donepezil 10 milliGRAM(s) Oral at bedtime  finasteride 5 milliGRAM(s) Oral daily  latanoprost 0.005% Ophthalmic Solution 1 Drop(s) Both EYES at bedtime  memantine 10 milliGRAM(s) Oral two times a day  metoprolol tartrate 25 milliGRAM(s) Oral two times a day  midodrine. 10 milliGRAM(s) Oral three times a day  polyethylene glycol 3350 17 Gram(s) Oral daily  QUEtiapine 25 milliGRAM(s) Oral two times a day  sacubitril 24 mG/valsartan 26 mG 1 Tablet(s) Oral two times a day  senna 2 Tablet(s) Oral at bedtime  spironolactone 25 milliGRAM(s) Oral daily  tamsulosin 0.4 milliGRAM(s) Oral at bedtime  MEDICATIONS  (PRN):    PHYSICAL EXAM:  GENERAL: NAD, well-groomed, well-developed  HEAD:  Atraumatic, Normocephalic  NERVOUS SYSTEM:  Alert & Oriented X1, Pt alert only to self,   HEART: Heart rate irregularly irregular on exam. S1, S2, No murmurs, rubs, or gallops  ABDOMEN: Soft, Nontender, Nondistended; Bowel sounds present  EXTREMITIES:  2+ Peripheral Pulses, No clubbing, cyanosis, or edema    LABS:                                14.3   6.55  )-----------( 192      ( 23 Feb 2023 05:51 )             43.8     02-23    137  |  104  |  13  ----------------------------<  108<H>  4.4   |  24  |  1.2    Ca    8.8      23 Feb 2023 05:51  Mg     1.8     02-23    TPro  5.9<L>  /  Alb  3.0<L>  /  TBili  0.6  /  DBili  x   /  AST  19  /  ALT  15  /  AlkPhos  70  02-23    RADIOLOGY & ADDITIONAL TESTS:    20-Feb-2023 10:04, CT Head No Cont  CT Head No Cont:   	ACC: [id number] EXAM:  CT CERVICAL SPINE   ORDERED BY: ALVA TAVARES   	ACC: [id number] EXAM:  CT BRAIN   ORDERED BY: ALVA TAVARES   	PROCEDURE DATE:  02/20/2023    	INTERPRETATION:  CLINICAL INDICATION: Trauma  	CT BRAIN:  	TECHNIQUE:  Multiple contiguous axial images were obtained from the skull   	base to the vertex without the use of intravenous contrast. Reformatted   	coronal and sagittal images were subsequently obtained and reviewed.  	COMPARISON EXAMINATION: 12/28/2022  	FINDINGS:  	There is no CT evidence of acute transcortical infarct. Age-related   	involutional changes and chronic microvascular ischemic changes.  	  	There is no hydrocephalus, mass effect, midline shift, or acute   	intracranial hemorrhage. No extra-axial collection. Basal cisterns are   	patent.  	  	The visualized paranasal sinuses and mastoid air cells are clear.  	  	The calvarium is intact.  	  	Bilateral cataract surgery  	  	CT CERVICAL SPINE:  	TECHNIQUE:  Axial images were obtained through the cervicalspine using   	multislice helical technique.  Reformatted coronal and sagittal images   	were subsequently obtained and reviewed.  	COMPARISON EXAMINATION:  12/17/2021  	FINDINGS:  	There is no prevertebral soft tissue swelling. There is no splaying of   	the spinous processes. The occipital condyles are normal. Lateral masses   	of C1 align normally with C2. The atlantodental and atlanto-occipital   	joints are maintained. No lucent fracture line is identified. There is no   	spondylolisthesis. Facetjoint alignments are maintained.  	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 Findings include marginal osteophytes, uncovertebral spurring,   	loss of normal disc space height and endplate sclerosis, and facet joint   	space compartment narrowing with subchondral sclerosis and hypertrophic   	osteophytes at multiple levels. Canal contents are suboptimally evaluated   	inherent to CT technique.  	IMPRESSION:  	CT head: No evidence of acute transcortical infarct, acute intracranial   	hemorrhage or mass effect.  	CT cervical spine: No acute fracture or traumatic subluxation.  	  	--- End of Report ---  	CARL ANTONIO MD; Attending Radiologist  	This document has been electronically signed. Feb 20 202310:56AM    20-Feb-2023 10:09, CT Cervical Spine No Cont  CT Cervical Spine No Cont:   	ACC: [id number] EXAM:  CT CERVICAL SPINE   ORDERED BY: ALVA TAVARES   	ACC: [id number] EXAM:  CT BRAIN   ORDERED BY: ALVA TAVARES   	PROCEDURE DATE:  02/20/2023    	INTERPRETATION:  CLINICAL INDICATION: Trauma  	CT BRAIN:  	  	TECHNIQUE:  Multiple contiguous axial images were obtained from the skull   	base to the vertex without the use of intravenous contrast. Reformatted   	coronal and sagittal images were subsequently obtained and reviewed.  	COMPARISON EXAMINATION: 12/28/2022  	FINDINGS:  	There is no CT evidence of acute transcortical infarct. Age-related   	involutional changes and chronic microvascular ischemic changes.  	There is no hydrocephalus, mass effect, midline shift, or acute   	intracranial hemorrhage. No extra-axial collection. Basal cisterns are   	patent.  	The visualized paranasal sinuses and mastoid air cells are clear.  	The calvarium is intact.  	Bilateral cataract surgery.  	CT CERVICAL SPINE:  	TECHNIQUE:  Axial images were obtained through the cervicalspine using   	multislice helical technique.  Reformatted coronal and sagittal images   	were subsequently obtained and reviewed.  	COMPARISON EXAMINATION:  12/17/2021  	FINDINGS:  	There is no prevertebral soft tissue swelling. There is no splaying of   	the spinous processes. The occipital condyles are normal. Lateral masses   	of C1 align normally with C2. The atlantodental and atlanto-occipital   	joints are maintained. No lucent fracture line is identified. There is no   	spondylolisthesis. Facetjoint alignments are maintained.  	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 Findings include marginal osteophytes, uncovertebral spurring,   	loss of normal disc space height and endplate sclerosis, and facet joint   	space compartment narrowing with subchondral sclerosis and hypertrophic   	osteophytes at multiple levels. Canal contents are suboptimally evaluated   	inherent to CT technique.  	IMPRESSION:  	CT head: No evidence of acute transcortical infarct, acute intracranial   	hemorrhage or mass effect.  	CT cervical spine: No acute fracture or traumatic subluxation.  	--- End of Report ---  	CARL ANTONIO MD; Attending Radiologist  	This document has been electronically signed. Feb 20 202310:56AM  X-Ray, Fluoroscopy:    20-Feb-2023 10:04, CT Head No Cont  PACS Image: Image(s) Available    20-Feb-2023 10:09, CT Cervical Spine No Cont  PACS Image: Image(s) Available    20-Feb-2023 10:15, Xray Chest 1 View AP/PA  PACS Image: Image(s) Available  Xray Chest 1 View AP/PA:   	ACC: [id number] EXAM:  XR CHEST 1 VIEW   ORDERED BY: ALVA TAVARES   	PROCEDURE DATE:  02/20/2023    	INTERPRETATION:  STUDY INDICATION: Trauma Code  	Comparison: 1/5/2023.  	Technique/Positioning: Frontal view of the chest were obtained.  	Findings:  	Support devices: None.  	Cardiac/mediastinum/hilum: Within normal limits.  	Lung parenchyma/Pleura: No consolidation, effusion or pneumothorax. Right   	apical nodular opacity.  	Skeleton/soft tissues:  No radiographically evident acutedisplaced   	fracture within the limitations of this exam.  	Impression:  	No acute abnormality on frontal chest radiograph.  	--- End of Report ---  	MAMIE TAYLOR MD; Attending Radiologist  	This document has been electronically signed. Feb20 2023  2:00PM    20-Feb-2023 10:16, Xray Pelvis AP only  PACS Image: Image(s) Available  Xray Pelvis AP only:   	ACC: [id number] EXAM:  XR PELVIS AP ONLY 1-2 VIEWS   ORDERED BY: ALVA TAVARES   	PROCEDURE DATE:  02/20/2023    	INTERPRETATION:  Clinical History / Reason for exam: Trauma  	Technique: Single view of AP pelvis obtained.  	Comparison: Radiograph dated 12/16/2021  	Findings/  	impression  	Bony demineralization. No definitive evidence of acute displaced   	fracture. Contrast in the left ureter and bladder.  	--- End of Report ---  	SHALONDA CUEVAS MD; Attending Radiologist  	This document has been electronically signed. Feb 20 2023 10:29AM

## 2023-02-23 NOTE — CONSULT NOTE ADULT - ATTENDING COMMENTS
Patient seen and evaluated by me on 2/23/23.     87yoM with dementia, HTN, HLD, CKD, rate controlled Afib, and biventricular HF with LVEF < 20% who presented for an unwitnessed fall. Cardiology consulted for HF.     ECG with Afib, 99 bpm, and IVCD. TTE with LVEF < 20%, global dysfunction, moderately enlarged RV with severely reduced systolic function, SHANNON, mild MR, mild TR, and mild PA. Patient has been hypotensive with SBP in the 70s-80s. He has been started on midodrine 10 mg TID and compression stocking are in place. Metoprolol has been decreased from 50 mg BID to 25 mg BID, and Entresto has been discontinued. Still on spironolactone.     Patient is unaware of his CHF diagnosis. Denies symptoms.     Recommendations:   - Consider fever work-up for evaluation of hypotension  - Discontinue spironolactone  - Continue Lopressor 25 mg BID  - If BP recovers, I would prioritize increasing Lopressor to 50 mg BID  - Continue apixaban 5 mg BID  - On norepinephrine, can check daily lactate and wean off pressor based on lactate levels  - Continue midodrine 10 mg TID  - Patient is not a candidate for home inotropes or other advanced therapies. If hypotension does not improve, would discuss hospice with family.   - Cardiology consult team to sign off
This is 88 y/o male w/ PMHx of Alzheimer's, BPH, gastritis, A fib, heart failure, BRAXTON, CKD 4, and HTN seen as Trauma Alert s/p unwitnessed fall at [facility] +HT ?LOC +Eliquis.  Trauma assessment in ED: ABCs intact , GCS 14 , AAOx1. Per facility, patient was found down in his room this morning and was complaining of back pain and so was brought to the ED. Last dose of Eliquis, baseline mental status, time down all unknown. Upon arrival to ED, patient is HD stable, afebrile, not complaining of any pain. No external signs of trauma.     Primary and secondary surveys were performed.    AAO x2  GCS 14.  Neuro intact.  Neck: no step-offs  Chest: clera.  CV : irrr  Abdomen: soft, nontender  Extr: no deformities    All images and labs were reviewed.    ASSESSMENT:  88 y/o male, S/p Fall.  Closed head injury.  Back pain.  A.fib. Coagulopathy due to Eliquis.    PLAN:  Patient was observed over several hours and remained hemodynamically and neurologically stable.  Further disposition as per ED    This note reflects my exam and care of this patient on 02/20/2023.

## 2023-02-23 NOTE — MEDICAL STUDENT PROGRESS NOTE(EDUCATION) - NS MD HP STUD ASPLAN PLAN FT
#Unwitnessed Fall 2/2 Mechanical vs Syncope  #r/o ACS  #H/o HFrEF  -Head trauma ?, Loss of consciousness ?, Anti-coagulation -ve  -Trauma work up: CT head, cervical spine, chest, abdomen and pelvis shows no acute traumatic event- left renal exophytic mass observed- OP follow up  -continue monitoring on tele  -Fall precaution  -Trops 0.03 -> 0.01  -EKG shows Afib, LVH and wide QRS- same as before  -c/w Eliquis  -Repeat Orthostatic Vitals today (2/23)  -F/u TTE  -PT eval    #Atrial fibrillation   [id number]: 4 (left systolic dysfunction, HTN, age)   - Eliquis- readjust dose if needed after weight is obtained  - cont metoprolol    #Severe MVR with systolic CHF- NOT in exacerbation  - ECHO EF 27 % with severe MVR from 12/2022  - Patient was supposed to follow OP with cardiology for mitraclip, Not sure if he followed, no OP cards note in HIE  - daily weights   - c/w Metoprolol, Entresto, Aldactone  - Cardiology c/s if TTE shows decrease in EF    #BPH   - c/w Tamsulosin and Finasteride    #Glaucoma   - cont home eye drops     #Misc  -Routine Lab frequency: Limit routine labs  -DVT prophylaxis: Eliquis  -GI prophylaxis: None  -Diet: DASH, pureed- pending S&S  -Code status: DNR/DNI  -Activity: IAT  -Bowel regimen: Senna, Miralax  -Urinary catheter: Incontinent  -Dispo: Tele    -Med rec confirmed with nursing home charts

## 2023-02-23 NOTE — CONSULT NOTE ADULT - ASSESSMENT
IMPRESSION  HFrEF <20% clinically not in exacerbation  Mod to sever reduced RVSF, Mild to Mod MR  A Fib - rate controlled  RAVINDER  s/p fall   HTN, HLD  Hx of CAD    RECOMMENDATIONS  telemonitoring  d/c aldactone  c/w metoprolol 25mg BID  c/w Eliquis for anticoagulation  currently on Levo, elevated lactate  rule out sepsis, check blood cx, ESR, CRP, LE duplex  wean off pressors as tolerated  hold metoprolol if unable to wean Levo  trend lactate, may need ionotrops if pressor requirement increases  Poor prognosis. Pt is not a candidate for aggressive cardiac interventions. GOC discussion with family.

## 2023-02-23 NOTE — PROGRESS NOTE ADULT - ASSESSMENT
88 y/o man with PMH of HTN, hyperlipidemia, ?CAD, HFrEF, CKD 3, BPH, Alzheimer's dementia, Afib recently diagnosed in 12/22 and on Eliquis and anxiety presented to the ED from the NH for an unwitnessed fall. Pt is an unreliable historian due to dementia. Per chart, pt was found on the floor and complained of back pain and brought to the ED.     1. s/p unwitnessed fall - mechanical vs syncope  fall precautions  continue telemetry  staff unable to obtain orthostatics while standing on 2/21  pt was hypotensive on 11/22 and started on midodrine 10mg tid and given bolus of 250cc x 2  furosemide stopped  metoprolol decreased to 25mg q12hr - hold for SBP <90  on 11/23, SBP >100 and he was given metoprolol and Entresto and midodrine -> SBP now in the 70s and not improving after 2 fluid boluses  Entresto now stopped  levophed started  cardiology fellow contacted and will f/u today  will make other adjustments as needed (hold spironolactone and flomax)  YANN stockings  resume PT when hemodynamically stable  trop 0.03 -> 0.01  EKG reviewed and interpreted by me: Afib with vent rate 99 and LAD  ECHO 12/22: EF 27%, severe MR in Dec 2022 and plan for outpt eval for Mitraclip  ECHO 2/23/23: EF<20%, severely reduced RV function, now mild MR?    2. Chronic Afib   - on lopressor and Eliquis    3. Chronic HFrEF - pt is not volume overloaded at this time  holding lasix and Entresto for now due to hypotension and monitor   repeat ECHO report as above    4. CKD stage 3 -  stable at baseline    5. BPH - on flomax and finasteride  Glaucoma - on home eye drops     DNR/DNI status  high risk pt        PROGRESS NOTE HANDOFF    Pending: BP monitoring on midodrine and levophed, cardiology consult, labs in AM    Family discussion: with daughter Bhavani Parks at bedside today    Disposition: STR at [facility]

## 2023-02-23 NOTE — CONSULT NOTE ADULT - SUBJECTIVE AND OBJECTIVE BOX
HPI:  **Patient has dementia, history obtained form SNF staff**    87-year-old male past medical history significant for hypertension, dyslipidemia, CAD, CHF, CKD stage III, BPH, Alzheimer's dementia, anxiety and A-fib on Eliquis presents for unwitnessed fall. Fall was unwitnessed, patient was found on the floor and started complaining of back pain, for which patient was referred to ED for further work up. Although patient has dementia but he was denying any complaints at time of my interview. SNF staff endorsed that most likely patient was trying to get out of bed and then fell and was found on his back.    In the ED, imaging showed no acute traumatic events.    Vital Signs Last 24 Hrs:  T(F): 97.7 (20 Feb 2023 09:44), Max: 97.7 (20 Feb 2023 09:44)  HR: 86 (20 Feb 2023 09:23) (86 - 86)  BP: 114/59 (20 Feb 2023 09:23) (114/59 - 114/59)  RR: 18 (20 Feb 2023 09:23) (18 - 18)  SpO2: 100% (20 Feb 2023 09:23) (100% - 100%) on RA   (20 Feb 2023 13:36)        PAST MEDICAL & SURGICAL HISTORY  Alzheimer disease    Glaucoma    Enlarged prostate    Hyperlipidemia    History of hernia repair    History of cataract surgery        FAMILY HISTORY:  FAMILY HISTORY:  No pertinent family history in first degree relatives        SOCIAL HISTORY:  Social History:      ALLERGIES:  No Known Allergies      MEDICATIONS:  apixaban 5 milliGRAM(s) Oral every 12 hours  donepezil 10 milliGRAM(s) Oral at bedtime  finasteride 5 milliGRAM(s) Oral daily  latanoprost 0.005% Ophthalmic Solution 1 Drop(s) Both EYES at bedtime  memantine 10 milliGRAM(s) Oral two times a day  metoprolol tartrate 25 milliGRAM(s) Oral two times a day  midodrine. 10 milliGRAM(s) Oral three times a day  norepinephrine Infusion 0.02 MICROgram(s)/kG/Min (3.09 mL/Hr) IV Continuous <Continuous>  polyethylene glycol 3350 17 Gram(s) Oral daily  QUEtiapine 25 milliGRAM(s) Oral two times a day  senna 2 Tablet(s) Oral at bedtime  spironolactone 25 milliGRAM(s) Oral daily  tamsulosin 0.4 milliGRAM(s) Oral at bedtime    PRN:      HOME MEDICATIONS:  Home Medications:  docusate potassium 100 mg oral capsule: 1 cap(s) orally every other day (20 Feb 2023 14:00)  donepezil 10 mg oral tablet: 1 tab(s) orally once a day (at bedtime) (20 Feb 2023 14:00)  finasteride 5 mg oral tablet: 1 tab(s) orally once a day (20 Feb 2023 14:00)  furosemide 20 mg oral tablet: 1 tab(s) orally once a day (20 Feb 2023 14:00)  latanoprost 0.005% ophthalmic solution: 1 drop(s) to each affected eye once a day (in the evening) (20 Feb 2023 14:00)  memantine 10 mg oral tablet: 1 tab(s) orally 2 times a day (20 Feb 2023 14:00)  metoprolol tartrate 50 mg oral tablet: 1 tab(s) orally 2 times a day (20 Feb 2023 14:00)  Myrbetriq 50 mg oral tablet, extended release: 1 tab(s) orally once a day (20 Feb 2023 14:00)  QUEtiapine 25 mg oral tablet: 1 tab(s) orally 2 times a day (20 Feb 2023 14:00)  sacubitril-valsartan 24 mg-26 mg oral tablet: 1 tab(s) orally 2 times a day (20 Feb 2023 14:00)  silodosin 8 mg oral capsule: 1 cap(s) orally once a day (20 Feb 2023 14:00)  spironolactone 25 mg oral tablet: 1 tab(s) orally once a day (20 Feb 2023 14:00)      VITALS:   T(F): 97.3 (02-23 @ 13:27), Max: 97.5 (02-23 @ 05:51)  HR: 92 (02-23 @ 13:27) (82 - 120)  BP: 83/56 (02-23 @ 17:19) (72/49 - 128/60)  BP(mean): 65 (02-23 @ 17:19) (58 - 65)  RR: 18 (02-23 @ 13:27) (17 - 18)  SpO2: 99% (02-21 @ 06:29) (95% - 99%)    I&O's Summary    23 Feb 2023 07:01  -  23 Feb 2023 18:43  --------------------------------------------------------  IN: 240 mL / OUT: 0 mL / NET: 240 mL        REVIEW OF SYSTEMS:  unable to obtain    PHYSICAL EXAM:  General: Not in distress.    HEENT: EOMI  Cardio: regular, S1, S2   Pulm: B/L BS.   Abdomen: Soft, non-tender, non-distended. Normoactive bowel sounds  Extremities: No edema b/l le  Neuro: A&O x 1-2. No focal deficits    LABS:                        14.3   6.55  )-----------( 192      ( 23 Feb 2023 05:51 )             43.8     02-23    137  |  104  |  13  ----------------------------<  108<H>  4.4   |  24  |  1.2    Ca    8.8      23 Feb 2023 05:51  Mg     1.8     02-23    TPro  5.9<L>  /  Alb  3.0<L>  /  TBili  0.6  /  DBili  x   /  AST  19  /  ALT  15  /  AlkPhos  70  02-23      Lactate, Blood: 2.5 mmol/L *H* (02-23-23 @ 16:23)          Troponin trend:      02-21 Chol 180 LDL -- HDL 32<L> Trig 150<H>  COVID-19 PCR: NotDetec (20 Feb 2023 14:37)  COVID-19 PCR: NotDetec (08 Jan 2023 13:05)  COVID-19 PCR: NotDetec (28 Dec 2022 16:25)      RADIOLOGY:  -CXR: < from: Xray Chest 1 View AP/PA (02.20.23 @ 10:15) >  Impression:    No acute abnormality on frontal chest radiograph.    < end of copied text >    < from: TTE Echo Complete w/o Contrast w/ Doppler (02.23.23 @ 11:03) >        Summary:   1. Left ventricular ejection fraction, by visual estimation, is <20%.   2. Severely decreased global left ventricular systolic function.   3. Severely enlarged right atrium.   4. The mitral in-flow pattern reveals no discernable A-wave, therefore   no comment on diastolic function can be made.   5. Moderately enlarged right ventricle.   6. Severely reduced RV systolic function.   7. Moderately enlarged left atrium.   8. Mild mitral valve regurgitation.   9. Mild tricuspid regurgitation.  10. Mild pulmonic valve regurgitation.    < end of copied text >        -OTHER:  ECG:  A Fib IVCD    TELEMETRY EVENTS: A Fib

## 2023-02-24 LAB
ALBUMIN SERPL ELPH-MCNC: 3 G/DL — LOW (ref 3.5–5.2)
ALP SERPL-CCNC: 69 U/L — SIGNIFICANT CHANGE UP (ref 30–115)
ALT FLD-CCNC: 13 U/L — SIGNIFICANT CHANGE UP (ref 0–41)
ANION GAP SERPL CALC-SCNC: 8 MMOL/L — SIGNIFICANT CHANGE UP (ref 7–14)
AST SERPL-CCNC: 16 U/L — SIGNIFICANT CHANGE UP (ref 0–41)
BASOPHILS # BLD AUTO: 0.04 K/UL — SIGNIFICANT CHANGE UP (ref 0–0.2)
BASOPHILS NFR BLD AUTO: 0.6 % — SIGNIFICANT CHANGE UP (ref 0–1)
BILIRUB SERPL-MCNC: 0.5 MG/DL — SIGNIFICANT CHANGE UP (ref 0.2–1.2)
BUN SERPL-MCNC: 11 MG/DL — SIGNIFICANT CHANGE UP (ref 10–20)
CALCIUM SERPL-MCNC: 9 MG/DL — SIGNIFICANT CHANGE UP (ref 8.4–10.5)
CHLORIDE SERPL-SCNC: 106 MMOL/L — SIGNIFICANT CHANGE UP (ref 98–110)
CO2 SERPL-SCNC: 23 MMOL/L — SIGNIFICANT CHANGE UP (ref 17–32)
CREAT SERPL-MCNC: 1 MG/DL — SIGNIFICANT CHANGE UP (ref 0.7–1.5)
EGFR: 73 ML/MIN/1.73M2 — SIGNIFICANT CHANGE UP
EOSINOPHIL # BLD AUTO: 0.62 K/UL — SIGNIFICANT CHANGE UP (ref 0–0.7)
EOSINOPHIL NFR BLD AUTO: 9.6 % — HIGH (ref 0–8)
GLUCOSE SERPL-MCNC: 122 MG/DL — HIGH (ref 70–99)
HCT VFR BLD CALC: 42.9 % — SIGNIFICANT CHANGE UP (ref 42–52)
HGB BLD-MCNC: 13.8 G/DL — LOW (ref 14–18)
IMM GRANULOCYTES NFR BLD AUTO: 0.2 % — SIGNIFICANT CHANGE UP (ref 0.1–0.3)
LACTATE SERPL-SCNC: 1.7 MMOL/L — SIGNIFICANT CHANGE UP (ref 0.7–2)
LYMPHOCYTES # BLD AUTO: 1.55 K/UL — SIGNIFICANT CHANGE UP (ref 1.2–3.4)
LYMPHOCYTES # BLD AUTO: 24.1 % — SIGNIFICANT CHANGE UP (ref 20.5–51.1)
MAGNESIUM SERPL-MCNC: 1.8 MG/DL — SIGNIFICANT CHANGE UP (ref 1.8–2.4)
MCHC RBC-ENTMCNC: 27.9 PG — SIGNIFICANT CHANGE UP (ref 27–31)
MCHC RBC-ENTMCNC: 32.2 G/DL — SIGNIFICANT CHANGE UP (ref 32–37)
MCV RBC AUTO: 86.8 FL — SIGNIFICANT CHANGE UP (ref 80–94)
MONOCYTES # BLD AUTO: 0.74 K/UL — HIGH (ref 0.1–0.6)
MONOCYTES NFR BLD AUTO: 11.5 % — HIGH (ref 1.7–9.3)
NEUTROPHILS # BLD AUTO: 3.47 K/UL — SIGNIFICANT CHANGE UP (ref 1.4–6.5)
NEUTROPHILS NFR BLD AUTO: 54 % — SIGNIFICANT CHANGE UP (ref 42.2–75.2)
NRBC # BLD: 0 /100 WBCS — SIGNIFICANT CHANGE UP (ref 0–0)
PLATELET # BLD AUTO: 187 K/UL — SIGNIFICANT CHANGE UP (ref 130–400)
POTASSIUM SERPL-MCNC: 4.3 MMOL/L — SIGNIFICANT CHANGE UP (ref 3.5–5)
POTASSIUM SERPL-SCNC: 4.3 MMOL/L — SIGNIFICANT CHANGE UP (ref 3.5–5)
PROT SERPL-MCNC: 5.9 G/DL — LOW (ref 6–8)
RBC # BLD: 4.94 M/UL — SIGNIFICANT CHANGE UP (ref 4.7–6.1)
RBC # FLD: 16.8 % — HIGH (ref 11.5–14.5)
SODIUM SERPL-SCNC: 137 MMOL/L — SIGNIFICANT CHANGE UP (ref 135–146)
WBC # BLD: 6.43 K/UL — SIGNIFICANT CHANGE UP (ref 4.8–10.8)
WBC # FLD AUTO: 6.43 K/UL — SIGNIFICANT CHANGE UP (ref 4.8–10.8)

## 2023-02-24 PROCEDURE — 93970 EXTREMITY STUDY: CPT | Mod: 26

## 2023-02-24 PROCEDURE — 99233 SBSQ HOSP IP/OBS HIGH 50: CPT

## 2023-02-24 PROCEDURE — 71045 X-RAY EXAM CHEST 1 VIEW: CPT | Mod: 26

## 2023-02-24 RX ORDER — MAGNESIUM SULFATE 500 MG/ML
2 VIAL (ML) INJECTION ONCE
Refills: 0 | Status: COMPLETED | OUTPATIENT
Start: 2023-02-24 | End: 2023-02-24

## 2023-02-24 RX ADMIN — QUETIAPINE FUMARATE 25 MILLIGRAM(S): 200 TABLET, FILM COATED ORAL at 17:28

## 2023-02-24 RX ADMIN — MEMANTINE HYDROCHLORIDE 10 MILLIGRAM(S): 10 TABLET ORAL at 05:24

## 2023-02-24 RX ADMIN — FINASTERIDE 5 MILLIGRAM(S): 5 TABLET, FILM COATED ORAL at 12:14

## 2023-02-24 RX ADMIN — SENNA PLUS 2 TABLET(S): 8.6 TABLET ORAL at 21:29

## 2023-02-24 RX ADMIN — MEMANTINE HYDROCHLORIDE 10 MILLIGRAM(S): 10 TABLET ORAL at 17:28

## 2023-02-24 RX ADMIN — APIXABAN 5 MILLIGRAM(S): 2.5 TABLET, FILM COATED ORAL at 05:36

## 2023-02-24 RX ADMIN — MIDODRINE HYDROCHLORIDE 10 MILLIGRAM(S): 2.5 TABLET ORAL at 12:14

## 2023-02-24 RX ADMIN — APIXABAN 5 MILLIGRAM(S): 2.5 TABLET, FILM COATED ORAL at 17:42

## 2023-02-24 RX ADMIN — DONEPEZIL HYDROCHLORIDE 10 MILLIGRAM(S): 10 TABLET, FILM COATED ORAL at 21:29

## 2023-02-24 RX ADMIN — MIDODRINE HYDROCHLORIDE 10 MILLIGRAM(S): 2.5 TABLET ORAL at 17:28

## 2023-02-24 RX ADMIN — QUETIAPINE FUMARATE 25 MILLIGRAM(S): 200 TABLET, FILM COATED ORAL at 05:24

## 2023-02-24 RX ADMIN — TAMSULOSIN HYDROCHLORIDE 0.4 MILLIGRAM(S): 0.4 CAPSULE ORAL at 21:29

## 2023-02-24 RX ADMIN — MIDODRINE HYDROCHLORIDE 10 MILLIGRAM(S): 2.5 TABLET ORAL at 05:24

## 2023-02-24 RX ADMIN — Medication 25 GRAM(S): at 10:09

## 2023-02-24 RX ADMIN — POLYETHYLENE GLYCOL 3350 17 GRAM(S): 17 POWDER, FOR SOLUTION ORAL at 12:14

## 2023-02-24 RX ADMIN — LATANOPROST 1 DROP(S): 0.05 SOLUTION/ DROPS OPHTHALMIC; TOPICAL at 21:29

## 2023-02-24 NOTE — PROGRESS NOTE ADULT - ASSESSMENT
86 y/o man with PMH of HTN, hyperlipidemia, ?CAD, HFrEF, CKD 3, BPH, Alzheimer's dementia, Afib recently diagnosed in 12/22 and on Eliquis and anxiety presented to the ED from the NH for an unwitnessed fall. Pt is an unreliable historian due to dementia. Per chart, pt was found on the floor and complained of back pain and brought to the ED.     1. s/p unwitnessed fall - mechanical vs syncope  fall precautions  continue telemetry  staff unable to obtain orthostatics while standing on 2/21  pt was hypotensive on 11/22 and started on midodrine 10mg tid and given bolus of 250cc x 2  furosemide stopped  metoprolol decreased to 25mg q12hr - hold for SBP <90  on 11/23, SBP >100 and he was given metoprolol and Entresto and midodrine -> SBP now in the 70s and not improving after 2 fluid boluses  Entresto now stopped  levophed started  YANN stockings  resume PT when hemodynamically stable  trop 0.03 -> 0.01  EKG reviewed and interpreted by me: Afib with vent rate 99 and LAD  ECHO 12/22: EF 27%, severe MR in Dec 2022 and plan for outpt eval for Mitraclip  ECHO 2/23/23: EF<20%, severely reduced RV function, now mild MR    2. Shock -? etiology  pt now on levophed at 0.04 and midodrine 10mg tid  titrate levophed as tolerated  cardiology consult appreciated  encourage PO intake  holding metoprolol, Entresto, spironolactone, furosemide  rule out infection: f/u UA, blood cultures and trend lactate  CXR no infiltrate or effusion  f/u venous duplex of LE  not candidate for home inotrope per cardiology -> if pt unable to be weaned off pressor, will speak to family re: comfort care, hospice    3. Chronic Afib   - on lopressor (held now for hypotension) and Eliquis    3. Chronic HFrEF - pt is not volume overloaded at this time  holding lasix and Entresto for now due to hypotension and monitor   repeat ECHO report as above    4. CKD stage 3 -  stable at baseline    5. BPH - on flomax and finasteride  Glaucoma - on home eye drops     DNR/DNI status  high risk pt        PROGRESS NOTE HANDOFF    Pending: BP monitoring on midodrine and levophed, f/u rule out infection workup, labs in AM    Family discussion: with daughter Bhavani Parks at bedside    Disposition: STR at [facility]

## 2023-02-24 NOTE — MEDICAL STUDENT PROGRESS NOTE(EDUCATION) - SUBJECTIVE AND OBJECTIVE BOX
GORDON MERINO  87y  Male    Subjective:  Patient is an 87-year-old male with pmhx significant for hypertension, dyslipidemia, CAD, CHF, CKD stage III, BPH, Alzheimer's dementia, anxiety and A-fib on Eliquis presents for unwitnessed fall. Fall was unwitnessed, patient was found on the floor and started complaining of back pain, for which patient was referred to ED for further work up.     INTERVAL HPI/OVERNIGHT EVENTS: Per pt's sitter, pt was calm and slept well overnight. On exam pt calm and resting comfortably in bed, not in acute distress, no complaints during examination. Pt remains alert and oriented only to self.    Objective:  PAST MEDICAL & SURGICAL HISTORY:  Alzheimer disease  Glaucoma  Enlarged prostate  Hyperlipidemia  History of hernia repair  History of cataract surgery    Vitals:  T(C): 36.2 (24 Feb 2023 05:19), Max: 36.3 (23 Feb 2023 13:27)  T(F): 97.2 (24 Feb 2023 05:19), Max: 97.3 (23 Feb 2023 13:27)  HR: 99 (24 Feb 2023 07:20) (84 - 102)  BP: 115/67 (24 Feb 2023 07:20) (72/49 - 115/67)  BP(mean): 78 (24 Feb 2023 05:19) (58 - 83)  RR: 20 (24 Feb 2023 07:20) (17 - 20)  SpO2: 99% (23 Feb 2023 20:13) (99% - 100%)    Parameters below as of 23 Feb 2023 20:13  Patient On (Oxygen Delivery Method): room air    Orthostatic VS  02-21-23 @ 10:00  Lying BP: 105/68 HR: 79   Sitting BP: 108/78 HR: 133  Standing BP: --/-- HR: --  Site: upper right arm   Mode: electronic    MEDICATIONS  (STANDING):  apixaban 5 milliGRAM(s) Oral every 12 hours  donepezil 10 milliGRAM(s) Oral at bedtime  finasteride 5 milliGRAM(s) Oral daily  latanoprost 0.005% Ophthalmic Solution 1 Drop(s) Both EYES at bedtime  magnesium sulfate  IVPB 2 Gram(s) IV Intermittent once  memantine 10 milliGRAM(s) Oral two times a day  midodrine. 10 milliGRAM(s) Oral three times a day  norepinephrine Infusion 0.04 MICROgram(s)/kG/Min (6.19 mL/Hr) IV Continuous <Continuous>  polyethylene glycol 3350 17 Gram(s) Oral daily  QUEtiapine 25 milliGRAM(s) Oral two times a day  senna 2 Tablet(s) Oral at bedtime  tamsulosin 0.4 milliGRAM(s) Oral at bedtime    MEDICATIONS  (PRN):    PHYSICAL EXAM:  GENERAL: NAD, well-groomed, well-developed  HEAD:  Atraumatic, Normocephalic  NERVOUS SYSTEM:  Alert & Oriented X1, Pt alert only to self,   HEART: Heart rate irregularly irregular on exam. S1, S2, No murmurs, rubs, or gallops  ABDOMEN: Soft, Nontender, Nondistended; Bowel sounds present  EXTREMITIES:  2+ Peripheral Pulses, No clubbing, cyanosis, or edema    LABS:                            14.3   6.55  )-----------( 192      ( 23 Feb 2023 05:51 )             43.8     02-23    137  |  104  |  13  ----------------------------<  108<H>  4.4   |  24  |  1.2    Ca    8.8      23 Feb 2023 05:51  Mg     1.8     02-23    TPro  5.9<L>  /  Alb  3.0<L>  /  TBili  0.6  /  DBili  x   /  AST  19  /  ALT  15  /  AlkPhos  70  02-23    RADIOLOGY & ADDITIONAL TESTS:    20-Feb-2023 10:04, CT Head No Cont  CT Head No Cont:   	ACC: [id number] EXAM:  CT CERVICAL SPINE   ORDERED BY: ALVA TAVARES   	ACC: [id number] EXAM:  CT BRAIN   ORDERED BY: ALVA TAVARES   	PROCEDURE DATE:  02/20/2023    	INTERPRETATION:  CLINICAL INDICATION: Trauma  	CT BRAIN:  	TECHNIQUE:  Multiple contiguous axial images were obtained from the skull   	base to the vertex without the use of intravenous contrast. Reformatted   	coronal and sagittal images were subsequently obtained and reviewed.  	COMPARISON EXAMINATION: 12/28/2022  	FINDINGS:  	There is no CT evidence of acute transcortical infarct. Age-related   	involutional changes and chronic microvascular ischemic changes.  	  	There is no hydrocephalus, mass effect, midline shift, or acute   	intracranial hemorrhage. No extra-axial collection. Basal cisterns are   	patent.  	  	The visualized paranasal sinuses and mastoid air cells are clear.  	  	The calvarium is intact.  	  	Bilateral cataract surgery  	  	CT CERVICAL SPINE:  	TECHNIQUE:  Axial images were obtained through the cervicalspine using   	multislice helical technique.  Reformatted coronal and sagittal images   	were subsequently obtained and reviewed.  	COMPARISON EXAMINATION:  12/17/2021  	FINDINGS:  	There is no prevertebral soft tissue swelling. There is no splaying of   	the spinous processes. The occipital condyles are normal. Lateral masses   	of C1 align normally with C2. The atlantodental and atlanto-occipital   	joints are maintained. No lucent fracture line is identified. There is no   	spondylolisthesis. Facetjoint alignments are maintained.  	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 Findings include marginal osteophytes, uncovertebral spurring,   	loss of normal disc space height and endplate sclerosis, and facet joint   	space compartment narrowing with subchondral sclerosis and hypertrophic   	osteophytes at multiple levels. Canal contents are suboptimally evaluated   	inherent to CT technique.  	IMPRESSION:  	CT head: No evidence of acute transcortical infarct, acute intracranial   	hemorrhage or mass effect.  	CT cervical spine: No acute fracture or traumatic subluxation.  	  	--- End of Report ---  	CARL ANTONIO MD; Attending Radiologist  	This document has been electronically signed. Feb 20 202310:56AM    20-Feb-2023 10:09, CT Cervical Spine No Cont  CT Cervical Spine No Cont:   	ACC: [id number] EXAM:  CT CERVICAL SPINE   ORDERED BY: ALVA TAVARES   	ACC: [id number] EXAM:  CT BRAIN   ORDERED BY: ALVA TAVARES   	PROCEDURE DATE:  02/20/2023    	INTERPRETATION:  CLINICAL INDICATION: Trauma  	CT BRAIN:  	  	TECHNIQUE:  Multiple contiguous axial images were obtained from the skull   	base to the vertex without the use of intravenous contrast. Reformatted   	coronal and sagittal images were subsequently obtained and reviewed.  	COMPARISON EXAMINATION: 12/28/2022  	FINDINGS:  	There is no CT evidence of acute transcortical infarct. Age-related   	involutional changes and chronic microvascular ischemic changes.  	There is no hydrocephalus, mass effect, midline shift, or acute   	intracranial hemorrhage. No extra-axial collection. Basal cisterns are   	patent.  	The visualized paranasal sinuses and mastoid air cells are clear.  	The calvarium is intact.  	Bilateral cataract surgery.  	CT CERVICAL SPINE:  	TECHNIQUE:  Axial images were obtained through the cervicalspine using   	multislice helical technique.  Reformatted coronal and sagittal images   	were subsequently obtained and reviewed.  	COMPARISON EXAMINATION:  12/17/2021  	FINDINGS:  	There is no prevertebral soft tissue swelling. There is no splaying of   	the spinous processes. The occipital condyles are normal. Lateral masses   	of C1 align normally with C2. The atlantodental and atlanto-occipital   	joints are maintained. No lucent fracture line is identified. There is no   	spondylolisthesis. Facetjoint alignments are maintained.  	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 Findings include marginal osteophytes, uncovertebral spurring,   	loss of normal disc space height and endplate sclerosis, and facet joint   	space compartment narrowing with subchondral sclerosis and hypertrophic   	osteophytes at multiple levels. Canal contents are suboptimally evaluated   	inherent to CT technique.  	IMPRESSION:  	CT head: No evidence of acute transcortical infarct, acute intracranial   	hemorrhage or mass effect.  	CT cervical spine: No acute fracture or traumatic subluxation.  	--- End of Report ---  	CARL ANTONIO MD; Attending Radiologist  	This document has been electronically signed. Feb 20 202310:56AM  X-Ray, Fluoroscopy:    20-Feb-2023 10:04, CT Head No Cont  PACS Image: Image(s) Available    20-Feb-2023 10:09, CT Cervical Spine No Cont  PACS Image: Image(s) Available    20-Feb-2023 10:15, Xray Chest 1 View AP/PA  PACS Image: Image(s) Available  Xray Chest 1 View AP/PA:   	ACC: [id number] EXAM:  XR CHEST 1 VIEW   ORDERED BY: ALVA TAVARES   	PROCEDURE DATE:  02/20/2023    	INTERPRETATION:  STUDY INDICATION: Trauma Code  	Comparison: 1/5/2023.  	Technique/Positioning: Frontal view of the chest were obtained.  	Findings:  	Support devices: None.  	Cardiac/mediastinum/hilum: Within normal limits.  	Lung parenchyma/Pleura: No consolidation, effusion or pneumothorax. Right   	apical nodular opacity.  	Skeleton/soft tissues:  No radiographically evident acutedisplaced   	fracture within the limitations of this exam.  	Impression:  	No acute abnormality on frontal chest radiograph.  	--- End of Report ---  	MAMIE TAYLOR MD; Attending Radiologist  	This document has been electronically signed. Feb20 2023  2:00PM    20-Feb-2023 10:16, Xray Pelvis AP only  PACS Image: Image(s) Available  Xray Pelvis AP only:   	ACC: [id number] EXAM:  XR PELVIS AP ONLY 1-2 VIEWS   ORDERED BY: ALVA TAVARES   	PROCEDURE DATE:  02/20/2023    	INTERPRETATION:  Clinical History / Reason for exam: Trauma  	Technique: Single view of AP pelvis obtained.  	Comparison: Radiograph dated 12/16/2021  	Findings/  	impression  	Bony demineralization. No definitive evidence of acute displaced   	fracture. Contrast in the left ureter and bladder.  	--- End of Report ---  	SHALONDA CUEVAS MD; Attending Radiologist  	This document has been electronically signed. Feb 20 2023 10:29AM

## 2023-02-24 NOTE — MEDICAL STUDENT PROGRESS NOTE(EDUCATION) - NS MD HP STUD ASPLAN PLAN FT
#Unwitnessed Fall 2/2 Mechanical vs Syncope  #r/o ACS  #H/o HFrEF  -Head trauma ?, Loss of consciousness ?, Anti-coagulation -ve  -Trauma work up: CT head, cervical spine, chest, abdomen and pelvis shows no acute traumatic event- left renal exophytic mass observed- OP follow up  -continue monitoring on tele  -Fall precaution  -Trops 0.03 -> 0.01  -EKG shows Afib, LVH and wide QRS- same as before  -c/w Eliquis  -Repeat Orthostatic Vitals today (2/24)  -PT eval    #Atrial fibrillation   [id number]: 4 (left systolic dysfunction, HTN, age)   - c/w Eliquis  - cont metoprolol    #Severe MVR with systolic CHF- NOT in exacerbation  - Echo 2/23 shows EF <20%  - Patient was supposed to follow OP with cardiology for mitraclip, Not sure if he followed, no OP cards note in H[facility]  - daily weights   - Cardiology recs appreciated:   ·	d/c aldactone  ·	c/w metoprolol 25mg BID  ·	c/w Eliquis for anticoagulation  ·	rule out sepsis, check blood cx, ESR, CRP, LE duplex  ·	wean off pressors as tolerated  ·	hold metoprolol if unable to wean Levo  ·	trend lactate, may need ionotrops if pressor requirement increases      #BPH   - c/w Tamsulosin and Finasteride    #Glaucoma   - cont home eye drops     #Misc  -Routine Lab frequency: Limit routine labs  -DVT prophylaxis: Eliquis  -GI prophylaxis: None  -Diet: DASH, pureed- pending S&S  -Code status: DNR/DNI  -Activity: IAT  -Bowel regimen: Senna, Miralax  -Urinary catheter: Incontinent  -Dispo: Tele    -Med rec confirmed with nursing home charts

## 2023-02-25 LAB
ALBUMIN SERPL ELPH-MCNC: 3.1 G/DL — LOW (ref 3.5–5.2)
ALP SERPL-CCNC: 68 U/L — SIGNIFICANT CHANGE UP (ref 30–115)
ALT FLD-CCNC: 13 U/L — SIGNIFICANT CHANGE UP (ref 0–41)
ANION GAP SERPL CALC-SCNC: 11 MMOL/L — SIGNIFICANT CHANGE UP (ref 7–14)
AST SERPL-CCNC: 18 U/L — SIGNIFICANT CHANGE UP (ref 0–41)
BASOPHILS # BLD AUTO: 0.04 K/UL — SIGNIFICANT CHANGE UP (ref 0–0.2)
BASOPHILS NFR BLD AUTO: 0.7 % — SIGNIFICANT CHANGE UP (ref 0–1)
BILIRUB SERPL-MCNC: 0.5 MG/DL — SIGNIFICANT CHANGE UP (ref 0.2–1.2)
BUN SERPL-MCNC: 13 MG/DL — SIGNIFICANT CHANGE UP (ref 10–20)
CALCIUM SERPL-MCNC: 9 MG/DL — SIGNIFICANT CHANGE UP (ref 8.4–10.5)
CHLORIDE SERPL-SCNC: 106 MMOL/L — SIGNIFICANT CHANGE UP (ref 98–110)
CO2 SERPL-SCNC: 21 MMOL/L — SIGNIFICANT CHANGE UP (ref 17–32)
CREAT SERPL-MCNC: 1 MG/DL — SIGNIFICANT CHANGE UP (ref 0.7–1.5)
EGFR: 73 ML/MIN/1.73M2 — SIGNIFICANT CHANGE UP
EOSINOPHIL # BLD AUTO: 0.49 K/UL — SIGNIFICANT CHANGE UP (ref 0–0.7)
EOSINOPHIL NFR BLD AUTO: 8.2 % — HIGH (ref 0–8)
GLUCOSE SERPL-MCNC: 95 MG/DL — SIGNIFICANT CHANGE UP (ref 70–99)
HCT VFR BLD CALC: 43.1 % — SIGNIFICANT CHANGE UP (ref 42–52)
HGB BLD-MCNC: 13.9 G/DL — LOW (ref 14–18)
IMM GRANULOCYTES NFR BLD AUTO: 0.2 % — SIGNIFICANT CHANGE UP (ref 0.1–0.3)
LACTATE SERPL-SCNC: 1.1 MMOL/L — SIGNIFICANT CHANGE UP (ref 0.7–2)
LYMPHOCYTES # BLD AUTO: 1.58 K/UL — SIGNIFICANT CHANGE UP (ref 1.2–3.4)
LYMPHOCYTES # BLD AUTO: 26.3 % — SIGNIFICANT CHANGE UP (ref 20.5–51.1)
MAGNESIUM SERPL-MCNC: 1.8 MG/DL — SIGNIFICANT CHANGE UP (ref 1.8–2.4)
MCHC RBC-ENTMCNC: 28.3 PG — SIGNIFICANT CHANGE UP (ref 27–31)
MCHC RBC-ENTMCNC: 32.3 G/DL — SIGNIFICANT CHANGE UP (ref 32–37)
MCV RBC AUTO: 87.8 FL — SIGNIFICANT CHANGE UP (ref 80–94)
MONOCYTES # BLD AUTO: 0.74 K/UL — HIGH (ref 0.1–0.6)
MONOCYTES NFR BLD AUTO: 12.3 % — HIGH (ref 1.7–9.3)
NEUTROPHILS # BLD AUTO: 3.14 K/UL — SIGNIFICANT CHANGE UP (ref 1.4–6.5)
NEUTROPHILS NFR BLD AUTO: 52.3 % — SIGNIFICANT CHANGE UP (ref 42.2–75.2)
NRBC # BLD: 0 /100 WBCS — SIGNIFICANT CHANGE UP (ref 0–0)
PLATELET # BLD AUTO: 163 K/UL — SIGNIFICANT CHANGE UP (ref 130–400)
POTASSIUM SERPL-MCNC: 4.3 MMOL/L — SIGNIFICANT CHANGE UP (ref 3.5–5)
POTASSIUM SERPL-SCNC: 4.3 MMOL/L — SIGNIFICANT CHANGE UP (ref 3.5–5)
PROT SERPL-MCNC: 5.7 G/DL — LOW (ref 6–8)
RBC # BLD: 4.91 M/UL — SIGNIFICANT CHANGE UP (ref 4.7–6.1)
RBC # FLD: 16.9 % — HIGH (ref 11.5–14.5)
SODIUM SERPL-SCNC: 138 MMOL/L — SIGNIFICANT CHANGE UP (ref 135–146)
WBC # BLD: 6 K/UL — SIGNIFICANT CHANGE UP (ref 4.8–10.8)
WBC # FLD AUTO: 6 K/UL — SIGNIFICANT CHANGE UP (ref 4.8–10.8)

## 2023-02-25 PROCEDURE — 99233 SBSQ HOSP IP/OBS HIGH 50: CPT

## 2023-02-25 RX ORDER — MIDODRINE HYDROCHLORIDE 2.5 MG/1
15 TABLET ORAL THREE TIMES A DAY
Refills: 0 | Status: DISCONTINUED | OUTPATIENT
Start: 2023-02-25 | End: 2023-02-28

## 2023-02-25 RX ORDER — NOREPINEPHRINE BITARTRATE/D5W 8 MG/250ML
0.01 PLASTIC BAG, INJECTION (ML) INTRAVENOUS
Qty: 8 | Refills: 0 | Status: DISCONTINUED | OUTPATIENT
Start: 2023-02-25 | End: 2023-02-25

## 2023-02-25 RX ORDER — DIGOXIN 250 MCG
250 TABLET ORAL DAILY
Refills: 0 | Status: DISCONTINUED | OUTPATIENT
Start: 2023-02-25 | End: 2023-02-28

## 2023-02-25 RX ADMIN — Medication 1.55 MICROGRAM(S)/KG/MIN: at 05:18

## 2023-02-25 RX ADMIN — MEMANTINE HYDROCHLORIDE 10 MILLIGRAM(S): 10 TABLET ORAL at 18:25

## 2023-02-25 RX ADMIN — SENNA PLUS 2 TABLET(S): 8.6 TABLET ORAL at 21:24

## 2023-02-25 RX ADMIN — MIDODRINE HYDROCHLORIDE 15 MILLIGRAM(S): 2.5 TABLET ORAL at 18:25

## 2023-02-25 RX ADMIN — Medication 250 MICROGRAM(S): at 23:35

## 2023-02-25 RX ADMIN — MIDODRINE HYDROCHLORIDE 15 MILLIGRAM(S): 2.5 TABLET ORAL at 11:52

## 2023-02-25 RX ADMIN — QUETIAPINE FUMARATE 25 MILLIGRAM(S): 200 TABLET, FILM COATED ORAL at 18:25

## 2023-02-25 RX ADMIN — MEMANTINE HYDROCHLORIDE 10 MILLIGRAM(S): 10 TABLET ORAL at 05:37

## 2023-02-25 RX ADMIN — LATANOPROST 1 DROP(S): 0.05 SOLUTION/ DROPS OPHTHALMIC; TOPICAL at 22:30

## 2023-02-25 RX ADMIN — TAMSULOSIN HYDROCHLORIDE 0.4 MILLIGRAM(S): 0.4 CAPSULE ORAL at 21:24

## 2023-02-25 RX ADMIN — QUETIAPINE FUMARATE 25 MILLIGRAM(S): 200 TABLET, FILM COATED ORAL at 05:37

## 2023-02-25 RX ADMIN — MIDODRINE HYDROCHLORIDE 10 MILLIGRAM(S): 2.5 TABLET ORAL at 05:37

## 2023-02-25 RX ADMIN — APIXABAN 5 MILLIGRAM(S): 2.5 TABLET, FILM COATED ORAL at 18:25

## 2023-02-25 RX ADMIN — FINASTERIDE 5 MILLIGRAM(S): 5 TABLET, FILM COATED ORAL at 11:52

## 2023-02-25 RX ADMIN — DONEPEZIL HYDROCHLORIDE 10 MILLIGRAM(S): 10 TABLET, FILM COATED ORAL at 21:24

## 2023-02-25 RX ADMIN — APIXABAN 5 MILLIGRAM(S): 2.5 TABLET, FILM COATED ORAL at 05:37

## 2023-02-25 NOTE — PROGRESS NOTE ADULT - ASSESSMENT
88 y/o man with PMH of HTN, hyperlipidemia, ?CAD, HFrEF, CKD 3, BPH, Alzheimer's dementia, Afib recently diagnosed in 12/22 and on Eliquis and anxiety presented to the ED from the NH for an unwitnessed fall. Pt is an unreliable historian due to dementia. Per chart, pt was found on the floor and complained of back pain and brought to the ED.     1. s/p unwitnessed fall - mechanical vs syncope  fall precautions  continue telemetry  staff unable to obtain orthostatics while standing on 2/21  pt was hypotensive on 11/22 and started on midodrine 10mg tid and given bolus of 250cc x 2  furosemide stopped  metoprolol decreased to 25mg q12hr - hold for SBP <90  on 11/23, SBP >100 and he was given metoprolol and Entresto and midodrine -> SBP in the 70s and did not improve after 2 fluid boluses  Entresto stopped  levophed started  YANN stockings  resume PT when hemodynamically stable  trop 0.03 -> 0.01  EKG reviewed and interpreted by me: Afib with vent rate 99 and LAD  ECHO 12/22: EF 27%, severe MR in Dec 2022 and plan for outpt eval for Mitraclip  ECHO 2/23/23: EF<20%, severely reduced RV function, now mild MR    2. Shock - possibly cardiogenic  pt now on levophed at 0.01 and midodrine 15mg tid  titrate levophed off as tolerated  cardiology consult appreciated  encourage PO intake  holding metoprolol, Entresto, spironolactone, furosemide  no evidence of infection at this time: blood cultures negative and repeat CXR no infiltrate or effusion  lactate trended down from 2.5  venous duplex of LE negative  not candidate for home inotrope per cardiology -> if pt unable to be weaned off pressor, will speak to family re: comfort care, hospice    3. Chronic Afib   on lopressor (held now for hypotension) and Eliquis  once off levophed and BP is relatively stable, restart metoprolol at 12.5mg bid and titrate up as tolerated    4. Chronic HFrEF - pt is not volume overloaded at this time  holding lasix and Entresto for now due to hypotension and monitor   repeat ECHO report as above    5. CKD stage 3 -  stable at baseline    6. BPH - on flomax and finasteride  Glaucoma - on home eye drops     DNR/DNI status  high risk pt        PROGRESS NOTE HANDOFF    Pending: BP monitoring on midodrine and levophed, labs in AM, restart metoprolol once BP stable off levophed    Family discussion: with daughter Bhavani Parks - she is aware of the plan of care and that if the pt is unable to be weaned off pressor support that hospice would be the next option    Disposition: STR at [facility]

## 2023-02-26 LAB
ALBUMIN SERPL ELPH-MCNC: 3.1 G/DL — LOW (ref 3.5–5.2)
ALP SERPL-CCNC: 71 U/L — SIGNIFICANT CHANGE UP (ref 30–115)
ALT FLD-CCNC: 14 U/L — SIGNIFICANT CHANGE UP (ref 0–41)
ANION GAP SERPL CALC-SCNC: 5 MMOL/L — LOW (ref 7–14)
AST SERPL-CCNC: 18 U/L — SIGNIFICANT CHANGE UP (ref 0–41)
BASOPHILS # BLD AUTO: 0.03 K/UL — SIGNIFICANT CHANGE UP (ref 0–0.2)
BASOPHILS NFR BLD AUTO: 0.5 % — SIGNIFICANT CHANGE UP (ref 0–1)
BILIRUB SERPL-MCNC: 0.4 MG/DL — SIGNIFICANT CHANGE UP (ref 0.2–1.2)
BUN SERPL-MCNC: 11 MG/DL — SIGNIFICANT CHANGE UP (ref 10–20)
CALCIUM SERPL-MCNC: 9.1 MG/DL — SIGNIFICANT CHANGE UP (ref 8.4–10.4)
CHLORIDE SERPL-SCNC: 105 MMOL/L — SIGNIFICANT CHANGE UP (ref 98–110)
CO2 SERPL-SCNC: 28 MMOL/L — SIGNIFICANT CHANGE UP (ref 17–32)
CREAT SERPL-MCNC: 1.2 MG/DL — SIGNIFICANT CHANGE UP (ref 0.7–1.5)
EGFR: 59 ML/MIN/1.73M2 — LOW
EOSINOPHIL # BLD AUTO: 0.51 K/UL — SIGNIFICANT CHANGE UP (ref 0–0.7)
EOSINOPHIL NFR BLD AUTO: 8.5 % — HIGH (ref 0–8)
GLUCOSE SERPL-MCNC: 104 MG/DL — HIGH (ref 70–99)
HCT VFR BLD CALC: 42.6 % — SIGNIFICANT CHANGE UP (ref 42–52)
HGB BLD-MCNC: 13.6 G/DL — LOW (ref 14–18)
IMM GRANULOCYTES NFR BLD AUTO: 0.2 % — SIGNIFICANT CHANGE UP (ref 0.1–0.3)
LYMPHOCYTES # BLD AUTO: 1.38 K/UL — SIGNIFICANT CHANGE UP (ref 1.2–3.4)
LYMPHOCYTES # BLD AUTO: 22.9 % — SIGNIFICANT CHANGE UP (ref 20.5–51.1)
MAGNESIUM SERPL-MCNC: 1.8 MG/DL — SIGNIFICANT CHANGE UP (ref 1.8–2.4)
MCHC RBC-ENTMCNC: 28.2 PG — SIGNIFICANT CHANGE UP (ref 27–31)
MCHC RBC-ENTMCNC: 31.9 G/DL — LOW (ref 32–37)
MCV RBC AUTO: 88.4 FL — SIGNIFICANT CHANGE UP (ref 80–94)
MONOCYTES # BLD AUTO: 0.71 K/UL — HIGH (ref 0.1–0.6)
MONOCYTES NFR BLD AUTO: 11.8 % — HIGH (ref 1.7–9.3)
NEUTROPHILS # BLD AUTO: 3.38 K/UL — SIGNIFICANT CHANGE UP (ref 1.4–6.5)
NEUTROPHILS NFR BLD AUTO: 56.1 % — SIGNIFICANT CHANGE UP (ref 42.2–75.2)
NRBC # BLD: 0 /100 WBCS — SIGNIFICANT CHANGE UP (ref 0–0)
PLATELET # BLD AUTO: 185 K/UL — SIGNIFICANT CHANGE UP (ref 130–400)
POTASSIUM SERPL-MCNC: 5 MMOL/L — SIGNIFICANT CHANGE UP (ref 3.5–5)
POTASSIUM SERPL-SCNC: 5 MMOL/L — SIGNIFICANT CHANGE UP (ref 3.5–5)
PROT SERPL-MCNC: 5.9 G/DL — LOW (ref 6–8)
RBC # BLD: 4.82 M/UL — SIGNIFICANT CHANGE UP (ref 4.7–6.1)
RBC # FLD: 16.8 % — HIGH (ref 11.5–14.5)
SODIUM SERPL-SCNC: 138 MMOL/L — SIGNIFICANT CHANGE UP (ref 135–146)
WBC # BLD: 6.02 K/UL — SIGNIFICANT CHANGE UP (ref 4.8–10.8)
WBC # FLD AUTO: 6.02 K/UL — SIGNIFICANT CHANGE UP (ref 4.8–10.8)

## 2023-02-26 PROCEDURE — 99233 SBSQ HOSP IP/OBS HIGH 50: CPT

## 2023-02-26 RX ORDER — METOPROLOL TARTRATE 50 MG
5 TABLET ORAL ONCE
Refills: 0 | Status: COMPLETED | OUTPATIENT
Start: 2023-02-26 | End: 2023-02-26

## 2023-02-26 RX ORDER — METOPROLOL TARTRATE 50 MG
12.5 TABLET ORAL
Refills: 0 | Status: DISCONTINUED | OUTPATIENT
Start: 2023-02-26 | End: 2023-02-28

## 2023-02-26 RX ADMIN — LATANOPROST 1 DROP(S): 0.05 SOLUTION/ DROPS OPHTHALMIC; TOPICAL at 22:34

## 2023-02-26 RX ADMIN — APIXABAN 5 MILLIGRAM(S): 2.5 TABLET, FILM COATED ORAL at 18:14

## 2023-02-26 RX ADMIN — Medication 5 MILLIGRAM(S): at 01:25

## 2023-02-26 RX ADMIN — TAMSULOSIN HYDROCHLORIDE 0.4 MILLIGRAM(S): 0.4 CAPSULE ORAL at 22:34

## 2023-02-26 RX ADMIN — MIDODRINE HYDROCHLORIDE 15 MILLIGRAM(S): 2.5 TABLET ORAL at 18:14

## 2023-02-26 RX ADMIN — QUETIAPINE FUMARATE 25 MILLIGRAM(S): 200 TABLET, FILM COATED ORAL at 18:15

## 2023-02-26 RX ADMIN — QUETIAPINE FUMARATE 25 MILLIGRAM(S): 200 TABLET, FILM COATED ORAL at 05:52

## 2023-02-26 RX ADMIN — APIXABAN 5 MILLIGRAM(S): 2.5 TABLET, FILM COATED ORAL at 05:53

## 2023-02-26 RX ADMIN — MEMANTINE HYDROCHLORIDE 10 MILLIGRAM(S): 10 TABLET ORAL at 18:14

## 2023-02-26 RX ADMIN — MEMANTINE HYDROCHLORIDE 10 MILLIGRAM(S): 10 TABLET ORAL at 05:53

## 2023-02-26 RX ADMIN — MIDODRINE HYDROCHLORIDE 15 MILLIGRAM(S): 2.5 TABLET ORAL at 12:41

## 2023-02-26 RX ADMIN — DONEPEZIL HYDROCHLORIDE 10 MILLIGRAM(S): 10 TABLET, FILM COATED ORAL at 22:34

## 2023-02-26 RX ADMIN — FINASTERIDE 5 MILLIGRAM(S): 5 TABLET, FILM COATED ORAL at 12:41

## 2023-02-26 RX ADMIN — Medication 12.5 MILLIGRAM(S): at 22:33

## 2023-02-26 RX ADMIN — MIDODRINE HYDROCHLORIDE 15 MILLIGRAM(S): 2.5 TABLET ORAL at 05:52

## 2023-02-26 RX ADMIN — SENNA PLUS 2 TABLET(S): 8.6 TABLET ORAL at 22:34

## 2023-02-26 RX ADMIN — Medication 5 MILLIGRAM(S): at 20:30

## 2023-02-26 NOTE — PROGRESS NOTE ADULT - ASSESSMENT
86 y/o man with PMH of HTN, hyperlipidemia, ?CAD, HFrEF, CKD 3, BPH, Alzheimer's dementia, Afib recently diagnosed in 12/22 and on Eliquis and anxiety presented to the ED from the NH for an unwitnessed fall. Pt is an unreliable historian due to dementia. Per chart, pt was found on the floor and complained of back pain and brought to the ED.     1. s/p unwitnessed fall - mechanical vs syncope  fall precautions  continue telemetry  staff unable to obtain orthostatics while standing on 2/21  pt was hypotensive on 11/22 and started on midodrine 10mg tid and given bolus of 250cc x 2  furosemide stopped  metoprolol decreased to 25mg q12hr - hold for SBP <90  on 11/23, SBP >100 and he was given metoprolol and Entresto and midodrine -> SBP in the 70s and did not improve after 2 fluid boluses  Entresto stopped  levophed started   YANN stockings  resume PT - pt now off levophed and BP is stable  trop 0.03 -> 0.01  EKG reviewed and interpreted by me: Afib with vent rate 99 and LAD  ECHO 12/22: EF 27%, severe MR in Dec 2022 and plan for outpt eval for Mitraclip  ECHO 2/23/23: EF<20%, severely reduced RV function, now mild MR    2. Shock - possibly cardiogenic  levophed now off since 2/25 PM  continue midodrine 15mg tid  cardiology consult appreciated  encourage PO intake  holding metoprolol, Entresto, spironolactone, furosemide  no evidence of infection at this time: blood cultures negative and repeat CXR no infiltrate or effusion  lactate trended down from 2.5  venous duplex of LE negative  not candidate for home inotrope per cardiology -> if pt unable to be weaned off pressor, will speak to family re: comfort care, hospice    3. Chronic Afib   on lopressor (held now for hypotension) and Eliquis  digoxin started and check level in a few days  if HR still >110, then start metoprolol at 12.5mg bid and titrate up as tolerated    4. Chronic HFrEF - pt is not volume overloaded at this time  holding lasix and Entresto for now due to hypotension and monitor   repeat ECHO report as above    5. CKD stage 3 -  stable at baseline    6. BPH - on flomax and finasteride  Glaucoma - on home eye drops     DNR/DNI status  high risk pt        PROGRESS NOTE HANDOFF    Pending: BP monitoring on midodrine and HR monitoring on digoxin, resume PT    Family discussion: with daughter Bhavani Pakrs - she is aware of the plan of care and that if the pt is unable to be weaned off pressor support that hospice would be the next option    Disposition: STR at [facility], [place], operated by [facility] 86 y/o man with PMH of HTN, hyperlipidemia, ?CAD, HFrEF, CKD 3, BPH, Alzheimer's dementia, Afib recently diagnosed in 12/22 and on Eliquis and anxiety presented to the ED from the NH for an unwitnessed fall. Pt is an unreliable historian due to dementia. Per chart, pt was found on the floor and complained of back pain and brought to the ED.     1. s/p unwitnessed fall - mechanical vs syncope  fall precautions  continue telemetry  staff unable to obtain orthostatics while standing on 2/21  pt was hypotensive on 11/22 and started on midodrine 10mg tid and given bolus of 250cc x 2  furosemide stopped  metoprolol decreased to 25mg q12hr - hold for SBP <90  on 11/23, SBP >100 and he was given metoprolol and Entresto and midodrine -> SBP in the 70s and did not improve after 2 fluid boluses  Entresto stopped  levophed started   YANN stockings  resume PT - pt now off levophed and BP is stable  trop 0.03 -> 0.01  EKG reviewed and interpreted by me: Afib with vent rate 99 and LAD  ECHO 12/22: EF 27%, severe MR in Dec 2022 and plan for outpt eval for Mitraclip  ECHO 2/23/23: EF<20%, severely reduced RV function, now mild MR    2. Shock - possibly cardiogenic  levophed now off since 2/25 PM  continue midodrine 15mg tid  cardiology consult appreciated  encourage PO intake  holding metoprolol, Entresto, spironolactone, furosemide  no evidence of infection at this time: blood cultures negative and repeat CXR no infiltrate or effusion  lactate trended down from 2.5  venous duplex of LE negative  not candidate for home inotrope per cardiology -> if pt unable to be weaned off pressor, will speak to family re: comfort care, hospice    3. Chronic Afib   on lopressor (held now for hypotension) and Eliquis  digoxin started and check level in a few days  if HR still >110, then start metoprolol at 12.5mg bid and titrate up as tolerated    4. Chronic HFrEF - pt is not volume overloaded at this time  holding lasix and Entresto for now due to hypotension and monitor   repeat ECHO report as above    5. CKD stage 3 -  stable at baseline    6. BPH - on flomax and finasteride  Glaucoma - on home eye drops     DNR/DNI status  high risk pt        PROGRESS NOTE HANDOFF    Pending: BP monitoring on midodrine and HR monitoring on digoxin, resume PT    Family discussion: with daughter Bhavani Parks today    Disposition: STR at [facility] once HR and BP are stable

## 2023-02-27 LAB
ALBUMIN SERPL ELPH-MCNC: 3 G/DL — LOW (ref 3.5–5.2)
ALP SERPL-CCNC: 66 U/L — SIGNIFICANT CHANGE UP (ref 30–115)
ALT FLD-CCNC: 13 U/L — SIGNIFICANT CHANGE UP (ref 0–41)
ANION GAP SERPL CALC-SCNC: 8 MMOL/L — SIGNIFICANT CHANGE UP (ref 7–14)
AST SERPL-CCNC: 16 U/L — SIGNIFICANT CHANGE UP (ref 0–41)
BASOPHILS # BLD AUTO: 0.04 K/UL — SIGNIFICANT CHANGE UP (ref 0–0.2)
BASOPHILS NFR BLD AUTO: 0.6 % — SIGNIFICANT CHANGE UP (ref 0–1)
BILIRUB SERPL-MCNC: 0.5 MG/DL — SIGNIFICANT CHANGE UP (ref 0.2–1.2)
BUN SERPL-MCNC: 12 MG/DL — SIGNIFICANT CHANGE UP (ref 10–20)
CALCIUM SERPL-MCNC: 9 MG/DL — SIGNIFICANT CHANGE UP (ref 8.4–10.4)
CHLORIDE SERPL-SCNC: 104 MMOL/L — SIGNIFICANT CHANGE UP (ref 98–110)
CO2 SERPL-SCNC: 25 MMOL/L — SIGNIFICANT CHANGE UP (ref 17–32)
CREAT SERPL-MCNC: 1 MG/DL — SIGNIFICANT CHANGE UP (ref 0.7–1.5)
EGFR: 73 ML/MIN/1.73M2 — SIGNIFICANT CHANGE UP
EOSINOPHIL # BLD AUTO: 0.55 K/UL — SIGNIFICANT CHANGE UP (ref 0–0.7)
EOSINOPHIL NFR BLD AUTO: 8.9 % — HIGH (ref 0–8)
GLUCOSE SERPL-MCNC: 95 MG/DL — SIGNIFICANT CHANGE UP (ref 70–99)
HCT VFR BLD CALC: 38.9 % — LOW (ref 42–52)
HGB BLD-MCNC: 12.6 G/DL — LOW (ref 14–18)
IMM GRANULOCYTES NFR BLD AUTO: 0.2 % — SIGNIFICANT CHANGE UP (ref 0.1–0.3)
LYMPHOCYTES # BLD AUTO: 1.55 K/UL — SIGNIFICANT CHANGE UP (ref 1.2–3.4)
LYMPHOCYTES # BLD AUTO: 25 % — SIGNIFICANT CHANGE UP (ref 20.5–51.1)
MAGNESIUM SERPL-MCNC: 1.8 MG/DL — SIGNIFICANT CHANGE UP (ref 1.8–2.4)
MCHC RBC-ENTMCNC: 28.1 PG — SIGNIFICANT CHANGE UP (ref 27–31)
MCHC RBC-ENTMCNC: 32.4 G/DL — SIGNIFICANT CHANGE UP (ref 32–37)
MCV RBC AUTO: 86.8 FL — SIGNIFICANT CHANGE UP (ref 80–94)
MONOCYTES # BLD AUTO: 0.78 K/UL — HIGH (ref 0.1–0.6)
MONOCYTES NFR BLD AUTO: 12.6 % — HIGH (ref 1.7–9.3)
NEUTROPHILS # BLD AUTO: 3.26 K/UL — SIGNIFICANT CHANGE UP (ref 1.4–6.5)
NEUTROPHILS NFR BLD AUTO: 52.7 % — SIGNIFICANT CHANGE UP (ref 42.2–75.2)
NRBC # BLD: 0 /100 WBCS — SIGNIFICANT CHANGE UP (ref 0–0)
PLATELET # BLD AUTO: 158 K/UL — SIGNIFICANT CHANGE UP (ref 130–400)
POTASSIUM SERPL-MCNC: 4 MMOL/L — SIGNIFICANT CHANGE UP (ref 3.5–5)
POTASSIUM SERPL-SCNC: 4 MMOL/L — SIGNIFICANT CHANGE UP (ref 3.5–5)
PROT SERPL-MCNC: 5.7 G/DL — LOW (ref 6–8)
RBC # BLD: 4.48 M/UL — LOW (ref 4.7–6.1)
RBC # FLD: 17 % — HIGH (ref 11.5–14.5)
SODIUM SERPL-SCNC: 137 MMOL/L — SIGNIFICANT CHANGE UP (ref 135–146)
WBC # BLD: 6.19 K/UL — SIGNIFICANT CHANGE UP (ref 4.8–10.8)
WBC # FLD AUTO: 6.19 K/UL — SIGNIFICANT CHANGE UP (ref 4.8–10.8)

## 2023-02-27 PROCEDURE — 99233 SBSQ HOSP IP/OBS HIGH 50: CPT

## 2023-02-27 RX ADMIN — Medication 250 MICROGRAM(S): at 06:37

## 2023-02-27 RX ADMIN — QUETIAPINE FUMARATE 25 MILLIGRAM(S): 200 TABLET, FILM COATED ORAL at 17:19

## 2023-02-27 RX ADMIN — MIDODRINE HYDROCHLORIDE 15 MILLIGRAM(S): 2.5 TABLET ORAL at 17:19

## 2023-02-27 RX ADMIN — FINASTERIDE 5 MILLIGRAM(S): 5 TABLET, FILM COATED ORAL at 12:08

## 2023-02-27 RX ADMIN — APIXABAN 5 MILLIGRAM(S): 2.5 TABLET, FILM COATED ORAL at 06:37

## 2023-02-27 RX ADMIN — LATANOPROST 1 DROP(S): 0.05 SOLUTION/ DROPS OPHTHALMIC; TOPICAL at 23:16

## 2023-02-27 RX ADMIN — POLYETHYLENE GLYCOL 3350 17 GRAM(S): 17 POWDER, FOR SOLUTION ORAL at 12:09

## 2023-02-27 RX ADMIN — MIDODRINE HYDROCHLORIDE 15 MILLIGRAM(S): 2.5 TABLET ORAL at 06:36

## 2023-02-27 RX ADMIN — QUETIAPINE FUMARATE 25 MILLIGRAM(S): 200 TABLET, FILM COATED ORAL at 06:36

## 2023-02-27 RX ADMIN — MIDODRINE HYDROCHLORIDE 15 MILLIGRAM(S): 2.5 TABLET ORAL at 12:08

## 2023-02-27 RX ADMIN — Medication 12.5 MILLIGRAM(S): at 06:36

## 2023-02-27 RX ADMIN — MEMANTINE HYDROCHLORIDE 10 MILLIGRAM(S): 10 TABLET ORAL at 06:36

## 2023-02-27 RX ADMIN — Medication 12.5 MILLIGRAM(S): at 17:19

## 2023-02-27 RX ADMIN — SENNA PLUS 2 TABLET(S): 8.6 TABLET ORAL at 23:12

## 2023-02-27 RX ADMIN — APIXABAN 5 MILLIGRAM(S): 2.5 TABLET, FILM COATED ORAL at 17:19

## 2023-02-27 RX ADMIN — DONEPEZIL HYDROCHLORIDE 10 MILLIGRAM(S): 10 TABLET, FILM COATED ORAL at 23:13

## 2023-02-27 RX ADMIN — TAMSULOSIN HYDROCHLORIDE 0.4 MILLIGRAM(S): 0.4 CAPSULE ORAL at 23:12

## 2023-02-27 RX ADMIN — MEMANTINE HYDROCHLORIDE 10 MILLIGRAM(S): 10 TABLET ORAL at 17:19

## 2023-02-27 NOTE — MEDICAL STUDENT PROGRESS NOTE(EDUCATION) - SUBJECTIVE AND OBJECTIVE BOX
GORDON MERINO  87y  Male    Subjective:  Patient is an 87-year-old male with pmhx significant for hypertension, dyslipidemia, CAD, CHF, CKD stage III, BPH, Alzheimer's dementia, anxiety and A-fib on Eliquis presents for unwitnessed fall. Fall was unwitnessed, patient was found on the floor and started complaining of back pain, for which patient was referred to ED for further work up.     INTERVAL HPI/OVERNIGHT EVENTS: Per pt's sitter, pt was calm and slept well overnight. On exam pt calm and resting comfortably in bed, not in acute distress. Pt remains alert and oriented only to self.    Objective:  PAST MEDICAL & SURGICAL HISTORY:  Alzheimer disease  Glaucoma  Enlarged prostate  Hyperlipidemia  History of hernia repair  History of cataract surgery    Vitals:  Vital Signs Last 24 Hrs  T(C): 36.2 (27 Feb 2023 05:02), Max: 36.2 (27 Feb 2023 05:02)  T(F): 97.2 (27 Feb 2023 05:02), Max: 97.2 (27 Feb 2023 05:02)  HR: 83 (27 Feb 2023 06:44) (83 - 132)  BP: 145/58 (27 Feb 2023 06:44) (91/55 - 145/58)  BP(mean): --  RR: 18 (26 Feb 2023 13:08) (18 - 18)  SpO2: --    MEDICATIONS  (STANDING):  apixaban 5 milliGRAM(s) Oral every 12 hours  digoxin     Tablet 250 MICROGram(s) Oral daily  donepezil 10 milliGRAM(s) Oral at bedtime  finasteride 5 milliGRAM(s) Oral daily  latanoprost 0.005% Ophthalmic Solution 1 Drop(s) Both EYES at bedtime  memantine 10 milliGRAM(s) Oral two times a day  metoprolol tartrate 12.5 milliGRAM(s) Oral two times a day  midodrine. 15 milliGRAM(s) Oral three times a day  polyethylene glycol 3350 17 Gram(s) Oral daily  QUEtiapine 25 milliGRAM(s) Oral two times a day  senna 2 Tablet(s) Oral at bedtime  tamsulosin 0.4 milliGRAM(s) Oral at bedtime    MEDICATIONS  (PRN):    PHYSICAL EXAM:  GENERAL: NAD, well-groomed, well-developed  HEAD:  Atraumatic, Normocephalic  NERVOUS SYSTEM:  Alert & Oriented X1, Pt alert only to self,   HEART: Heart rate irregularly irregular on exam. S1, S2, No murmurs, rubs, or gallops  ABDOMEN: Soft, Nontender, Nondistended; Bowel sounds present  EXTREMITIES:  2+ Peripheral Pulses, No clubbing, cyanosis, or edema    LABS:                                       12.6   6.19  )-----------( 158      ( 27 Feb 2023 05:11 )             38.9     02-27    137  |  104  |  12  ----------------------------<  95  4.0   |  25  |  1.0    Ca    9.0      27 Feb 2023 05:11  Mg     1.8     02-27    TPro  5.7<L>  /  Alb  3.0<L>  /  TBili  0.5  /  DBili  x   /  AST  16  /  ALT  13  /  AlkPhos  66  02-27  RADIOLOGY & ADDITIONAL TESTS:    20-Feb-2023 10:04, CT Head No Cont  CT Head No Cont:   	ACC: [id number] EXAM:  CT CERVICAL SPINE   ORDERED BY: ALVA TAVARES   	ACC: [id number] EXAM:  CT BRAIN   ORDERED BY: ALVA TAVARES   	PROCEDURE DATE:  02/20/2023    	INTERPRETATION:  CLINICAL INDICATION: Trauma  	CT BRAIN:  	TECHNIQUE:  Multiple contiguous axial images were obtained from the skull   	base to the vertex without the use of intravenous contrast. Reformatted   	coronal and sagittal images were subsequently obtained and reviewed.  	COMPARISON EXAMINATION: 12/28/2022  	FINDINGS:  	There is no CT evidence of acute transcortical infarct. Age-related   	involutional changes and chronic microvascular ischemic changes.  	  	There is no hydrocephalus, mass effect, midline shift, or acute   	intracranial hemorrhage. No extra-axial collection. Basal cisterns are   	patent.  	  	The visualized paranasal sinuses and mastoid air cells are clear.  	  	The calvarium is intact.  	  	Bilateral cataract surgery  	  	CT CERVICAL SPINE:  	TECHNIQUE:  Axial images were obtained through the cervicalspine using   	multislice helical technique.  Reformatted coronal and sagittal images   	were subsequently obtained and reviewed.  	COMPARISON EXAMINATION:  12/17/2021  	FINDINGS:  	There is no prevertebral soft tissue swelling. There is no splaying of   	the spinous processes. The occipital condyles are normal. Lateral masses   	of C1 align normally with C2. The atlantodental and atlanto-occipital   	joints are maintained. No lucent fracture line is identified. There is no   	spondylolisthesis. Facetjoint alignments are maintained.  	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 Findings include marginal osteophytes, uncovertebral spurring,   	loss of normal disc space height and endplate sclerosis, and facet joint   	space compartment narrowing with subchondral sclerosis and hypertrophic   	osteophytes at multiple levels. Canal contents are suboptimally evaluated   	inherent to CT technique.  	IMPRESSION:  	CT head: No evidence of acute transcortical infarct, acute intracranial   	hemorrhage or mass effect.  	CT cervical spine: No acute fracture or traumatic subluxation.  	  	--- End of Report ---  	CARL ANTONIO MD; Attending Radiologist  	This document has been electronically signed. Feb 20 202310:56AM    20-Feb-2023 10:09, CT Cervical Spine No Cont  CT Cervical Spine No Cont:   	ACC: [id number] EXAM:  CT CERVICAL SPINE   ORDERED BY: ALVA TAVARES   	ACC: [id number] EXAM:  CT BRAIN   ORDERED BY: ALVA TAVARES   	PROCEDURE DATE:  02/20/2023    	INTERPRETATION:  CLINICAL INDICATION: Trauma  	CT BRAIN:  	  	TECHNIQUE:  Multiple contiguous axial images were obtained from the skull   	base to the vertex without the use of intravenous contrast. Reformatted   	coronal and sagittal images were subsequently obtained and reviewed.  	COMPARISON EXAMINATION: 12/28/2022  	FINDINGS:  	There is no CT evidence of acute transcortical infarct. Age-related   	involutional changes and chronic microvascular ischemic changes.  	There is no hydrocephalus, mass effect, midline shift, or acute   	intracranial hemorrhage. No extra-axial collection. Basal cisterns are   	patent.  	The visualized paranasal sinuses and mastoid air cells are clear.  	The calvarium is intact.  	Bilateral cataract surgery.  	CT CERVICAL SPINE:  	TECHNIQUE:  Axial images were obtained through the cervicalspine using   	multislice helical technique.  Reformatted coronal and sagittal images   	were subsequently obtained and reviewed.  	COMPARISON EXAMINATION:  12/17/2021  	FINDINGS:  	There is no prevertebral soft tissue swelling. There is no splaying of   	the spinous processes. The occipital condyles are normal. Lateral masses   	of C1 align normally with C2. The atlantodental and atlanto-occipital   	joints are maintained. No lucent fracture line is identified. There is no   	spondylolisthesis. Facetjoint alignments are maintained.  	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 Findings include marginal osteophytes, uncovertebral spurring,   	loss of normal disc space height and endplate sclerosis, and facet joint   	space compartment narrowing with subchondral sclerosis and hypertrophic   	osteophytes at multiple levels. Canal contents are suboptimally evaluated   	inherent to CT technique.  	IMPRESSION:  	CT head: No evidence of acute transcortical infarct, acute intracranial   	hemorrhage or mass effect.  	CT cervical spine: No acute fracture or traumatic subluxation.  	--- End of Report ---  	CARL ANTONIO MD; Attending Radiologist  	This document has been electronically signed. Feb 20 202310:56AM  X-Ray, Fluoroscopy:    20-Feb-2023 10:04, CT Head No Cont  PACS Image: Image(s) Available    20-Feb-2023 10:09, CT Cervical Spine No Cont  PACS Image: Image(s) Available    20-Feb-2023 10:15, Xray Chest 1 View AP/PA  PACS Image: Image(s) Available  Xray Chest 1 View AP/PA:   	ACC: [id number] EXAM:  XR CHEST 1 VIEW   ORDERED BY: [employer]   	PROCEDURE DATE:  02/20/2023    	INTERPRETATION:  STUDY INDICATION: Trauma Code  	Comparison: 1/5/2023.  	Technique/Positioning: Frontal view of the chest were obtained.  	Findings:  	Support devices: None.  	Cardiac/mediastinum/hilum: Within normal limits.  	Lung parenchyma/Pleura: No consolidation, effusion or pneumothorax. Right   	apical nodular opacity.  	Skeleton/soft tissues:  No radiographically evident acutedisplaced   	fracture within the limitations of this exam.  	Impression:  	No acute abnormality on frontal chest radiograph.  	--- End of Report ---  	MAMIE TAYLOR MD; Attending Radiologist  	This document has been electronically signed. Feb20 2023  2:00PM    20-Feb-2023 10:16, Xray Pelvis AP only  PACS Image: Image(s) Available  Xray Pelvis AP only:   	ACC: [id number] EXAM:  XR PELVIS AP ONLY 1-2 VIEWS   ORDERED BY: [employer]   	PROCEDURE DATE:  02/20/2023    	INTERPRETATION:  Clinical History / Reason for exam: Trauma  	Technique: Single view of AP pelvis obtained.  	Comparison: Radiograph dated 12/16/2021  	Findings/  	impression  	Bony demineralization. No definitive evidence of acute displaced   	fracture. Contrast in the left ureter and bladder.  	--- End of Report ---  	SHALONDA CUEVAS MD; Attending Radiologist  	This document has been electronically signed. Feb 20 2023 10:29AM

## 2023-02-27 NOTE — MEDICAL STUDENT PROGRESS NOTE(EDUCATION) - NS MD HP STUD ASPLAN PLAN FT
#Unwitnessed Fall 2/2 Mechanical vs Syncope  #r/o ACS  #H/o HFrEF  -Head trauma ?, Loss of consciousness ?, Anti-coagulation -ve  -Trauma work up: CT head, cervical spine, chest, abdomen and pelvis shows no acute traumatic event- left renal exophytic mass observed- OP follow up  -continue monitoring on tele  -Fall precaution  -Trops 0.03 -> 0.01  -EKG shows Afib, LVH and wide QRS- same as before  -c/w Eliquis  -Repeat Orthostatic Vitals today (2/24)  -PT eval    #Shock - possibly cardiogenic  -off levophed since 2/25 PM  -continue midodrine 15mg tid  - PO Digoxin started yesterday  - Continue to Monitor BP  - Encourage PO intake  - Continue holding metoprolol, Entresto, spironolactone, and furosemide  - blood cultures negative, repeat CXR no infiltrate or effusion  - lactate trended down from 2.5  - venous duplex of LE negative  - not candidate for home inotrope per cardiology -> if pt unable to be weaned off pressor, will speak to family re: comfort care, hospice    #Atrial fibrillation   [id number]: 4 (left systolic dysfunction, HTN, age)   - c/w Eliquis  - cont metoprolol    #Severe MVR with systolic CHF- NOT in exacerbation  - Echo 2/23 shows EF <20%  - Patient was supposed to follow OP with cardiology for mitraclip, Not sure if he followed, no OP cards note in HIE  - daily weights      #BPH   - c/w Tamsulosin and Finasteride    #Glaucoma   - cont home eye drops     #Misc  -Routine Lab frequency: Limit routine labs  -DVT prophylaxis: Eliquis  -GI prophylaxis: None  -Diet: DASH, pureed- pending S&S  -Code status: DNR/DNI  -Activity: IAT  -Bowel regimen: Senna, Miralax  -Urinary catheter: Incontinent  -Dispo: Tele    -Med rec confirmed with nursing home charts #Unwitnessed Fall 2/2 Mechanical vs Syncope  #r/o ACS  #H/o HFrEF  -Head trauma ?, Loss of consciousness ?, Anti-coagulation -ve  -Trauma work up: CT head, cervical spine, chest, abdomen and pelvis shows no acute traumatic event- left renal exophytic mass observed- OP follow up  -continue monitoring on tele  -Fall precaution  -Trops 0.03 -> 0.01  -EKG shows Afib, LVH and wide QRS- same as before  -c/w Eliquis  -PT eval today (2/27)    #Shock - possibly cardiogenic  -off levophed since 2/25 PM  -continue midodrine 15mg tid  - PO Digoxin started yesterday  - Continue to Monitor BP  - Encourage PO intake  - Continue holding metoprolol, Entresto, spironolactone, and furosemide  - blood cultures negative, repeat CXR no infiltrate or effusion  - lactate trended down from 2.5  - venous duplex of LE negative  - not candidate for home inotrope per cardiology -> if pt unable to be weaned off pressor, will speak to family re: comfort care, hospice    #Atrial fibrillation   [id number]: 4 (left systolic dysfunction, HTN, age)   - c/w Eliquis  - cont metoprolol    #Severe MVR with systolic CHF- NOT in exacerbation  - Echo 2/23 shows EF <20%  - Patient was supposed to follow OP with cardiology for mitraclip, Not sure if he followed, no OP cards note in HIE  - daily weights      #BPH   - c/w Tamsulosin and Finasteride    #Glaucoma   - cont home eye drops     #Misc  -Routine Lab frequency: Limit routine labs  -DVT prophylaxis: Eliquis  -GI prophylaxis: None  -Diet: DASH, pureed- pending S&S  -Code status: DNR/DNI  -Activity: IAT  -Bowel regimen: Senna, Miralax  -Urinary catheter: Incontinent  -Dispo: Tele    -Med rec confirmed with nursing home charts

## 2023-02-27 NOTE — PROGRESS NOTE ADULT - ASSESSMENT
86 y/o man with PMH of HTN, hyperlipidemia, ?CAD, HFrEF, CKD 3, BPH, Alzheimer's dementia, Afib recently diagnosed in 12/22 and on Eliquis and anxiety presented to the ED from the NH for an unwitnessed fall. Pt is an unreliable historian due to dementia. Per chart, pt was found on the floor and complained of back pain and brought to the ED.     1. s/p unwitnessed fall - mechanical vs syncope  fall precautions  continue telemetry  staff unable to obtain orthostatics while standing on 2/21  pt was hypotensive on 11/22 and started on midodrine 10mg tid and given bolus of 250cc x 2  furosemide stopped  metoprolol decreased to 25mg q12hr - hold for SBP <90  on 11/23, SBP >100 and he was given metoprolol and Entresto and midodrine -> SBP in the 70s and did not improve after 2 fluid boluses  Entresto stopped  levophed started   YANN stockings  resume PT - pt now off levophed and BP is stable  trop 0.03 -> 0.01  EKG reviewed and interpreted by me: Afib with vent rate 99 and LAD  ECHO 12/22: EF 27%, severe MR in Dec 2022 and plan for outpt eval for Mitraclip  ECHO 2/23/23: EF<20%, severely reduced RV function, now mild MR    2. Shock - possibly cardiogenic  levophed now off since 2/25 PM  continue midodrine 15mg tid  cardiology consult appreciated  encourage PO intake  holding Entresto, spironolactone, furosemide  no evidence of infection at this time: blood cultures negative and repeat CXR no infiltrate or effusion  lactate trended down from 2.5  venous duplex of LE negative  not candidate for home inotrope per cardiology    3. Chronic Afib   on Eliquis  digoxin started and check level on 2/28  on metoprolol 12.5mg bid and HR is better controlled now - can increase to 12.5mg q6hr if HR >110 and SBP >90    4. Chronic HFrEF - pt is not volume overloaded at this time  holding lasix and Entresto for now due to hypotension and monitor   repeat ECHO report as above    5. CKD stage 3 -  stable at baseline    6. BPH - on flomax and finasteride  Glaucoma - on home eye drops     DNR/DNI status  high risk pt        PROGRESS NOTE HANDOFF    Pending: HR and BP monitoring, digoxin level in AM, resume PT    Family discussion: with daughter Bhavani Parks today    Disposition: STR at [facility] possibly in 24hr if remains stable

## 2023-02-28 ENCOUNTER — APPOINTMENT (OUTPATIENT)
Dept: CARDIOLOGY | Facility: CLINIC | Age: 88
End: 2023-02-28

## 2023-02-28 ENCOUNTER — TRANSCRIPTION ENCOUNTER (OUTPATIENT)
Age: 88
End: 2023-02-28

## 2023-02-28 VITALS — DIASTOLIC BLOOD PRESSURE: 97 MMHG | SYSTOLIC BLOOD PRESSURE: 132 MMHG | HEART RATE: 97 BPM

## 2023-02-28 LAB
ALBUMIN SERPL ELPH-MCNC: 3.3 G/DL — LOW (ref 3.5–5.2)
ALP SERPL-CCNC: 72 U/L — SIGNIFICANT CHANGE UP (ref 30–115)
ALT FLD-CCNC: 13 U/L — SIGNIFICANT CHANGE UP (ref 0–41)
ANION GAP SERPL CALC-SCNC: 10 MMOL/L — SIGNIFICANT CHANGE UP (ref 7–14)
AST SERPL-CCNC: 17 U/L — SIGNIFICANT CHANGE UP (ref 0–41)
BASOPHILS # BLD AUTO: 0.03 K/UL — SIGNIFICANT CHANGE UP (ref 0–0.2)
BASOPHILS NFR BLD AUTO: 0.5 % — SIGNIFICANT CHANGE UP (ref 0–1)
BILIRUB SERPL-MCNC: 0.5 MG/DL — SIGNIFICANT CHANGE UP (ref 0.2–1.2)
BUN SERPL-MCNC: 11 MG/DL — SIGNIFICANT CHANGE UP (ref 10–20)
CALCIUM SERPL-MCNC: 9.2 MG/DL — SIGNIFICANT CHANGE UP (ref 8.4–10.5)
CHLORIDE SERPL-SCNC: 103 MMOL/L — SIGNIFICANT CHANGE UP (ref 98–110)
CO2 SERPL-SCNC: 25 MMOL/L — SIGNIFICANT CHANGE UP (ref 17–32)
CREAT SERPL-MCNC: 1 MG/DL — SIGNIFICANT CHANGE UP (ref 0.7–1.5)
DIGOXIN SERPL-MCNC: 1.7 NG/ML — SIGNIFICANT CHANGE UP (ref 0.8–2)
EGFR: 73 ML/MIN/1.73M2 — SIGNIFICANT CHANGE UP
EOSINOPHIL # BLD AUTO: 0.54 K/UL — SIGNIFICANT CHANGE UP (ref 0–0.7)
EOSINOPHIL NFR BLD AUTO: 8.7 % — HIGH (ref 0–8)
GLUCOSE SERPL-MCNC: 100 MG/DL — HIGH (ref 70–99)
HCT VFR BLD CALC: 43.1 % — SIGNIFICANT CHANGE UP (ref 42–52)
HGB BLD-MCNC: 13.9 G/DL — LOW (ref 14–18)
IMM GRANULOCYTES NFR BLD AUTO: 0.2 % — SIGNIFICANT CHANGE UP (ref 0.1–0.3)
LYMPHOCYTES # BLD AUTO: 1.27 K/UL — SIGNIFICANT CHANGE UP (ref 1.2–3.4)
LYMPHOCYTES # BLD AUTO: 20.4 % — LOW (ref 20.5–51.1)
MAGNESIUM SERPL-MCNC: 1.9 MG/DL — SIGNIFICANT CHANGE UP (ref 1.8–2.4)
MCHC RBC-ENTMCNC: 28.4 PG — SIGNIFICANT CHANGE UP (ref 27–31)
MCHC RBC-ENTMCNC: 32.3 G/DL — SIGNIFICANT CHANGE UP (ref 32–37)
MCV RBC AUTO: 88.1 FL — SIGNIFICANT CHANGE UP (ref 80–94)
MONOCYTES # BLD AUTO: 0.78 K/UL — HIGH (ref 0.1–0.6)
MONOCYTES NFR BLD AUTO: 12.5 % — HIGH (ref 1.7–9.3)
NEUTROPHILS # BLD AUTO: 3.6 K/UL — SIGNIFICANT CHANGE UP (ref 1.4–6.5)
NEUTROPHILS NFR BLD AUTO: 57.7 % — SIGNIFICANT CHANGE UP (ref 42.2–75.2)
NRBC # BLD: 0 /100 WBCS — SIGNIFICANT CHANGE UP (ref 0–0)
PLATELET # BLD AUTO: 167 K/UL — SIGNIFICANT CHANGE UP (ref 130–400)
POTASSIUM SERPL-MCNC: 4.4 MMOL/L — SIGNIFICANT CHANGE UP (ref 3.5–5)
POTASSIUM SERPL-SCNC: 4.4 MMOL/L — SIGNIFICANT CHANGE UP (ref 3.5–5)
PROT SERPL-MCNC: 6 G/DL — SIGNIFICANT CHANGE UP (ref 6–8)
RBC # BLD: 4.89 M/UL — SIGNIFICANT CHANGE UP (ref 4.7–6.1)
RBC # FLD: 17 % — HIGH (ref 11.5–14.5)
SODIUM SERPL-SCNC: 138 MMOL/L — SIGNIFICANT CHANGE UP (ref 135–146)
WBC # BLD: 6.23 K/UL — SIGNIFICANT CHANGE UP (ref 4.8–10.8)
WBC # FLD AUTO: 6.23 K/UL — SIGNIFICANT CHANGE UP (ref 4.8–10.8)

## 2023-02-28 PROCEDURE — 99239 HOSP IP/OBS DSCHRG MGMT >30: CPT

## 2023-02-28 RX ORDER — METOPROLOL TARTRATE 50 MG
1 TABLET ORAL
Qty: 0 | Refills: 0 | DISCHARGE

## 2023-02-28 RX ORDER — SPIRONOLACTONE 25 MG/1
1 TABLET, FILM COATED ORAL
Qty: 0 | Refills: 0 | DISCHARGE

## 2023-02-28 RX ORDER — MIDODRINE HYDROCHLORIDE 2.5 MG/1
3 TABLET ORAL
Qty: 0 | Refills: 0 | DISCHARGE
Start: 2023-02-28

## 2023-02-28 RX ORDER — METOPROLOL TARTRATE 50 MG
1 TABLET ORAL
Qty: 30 | Refills: 0
Start: 2023-02-28 | End: 2023-03-29

## 2023-02-28 RX ORDER — MAGNESIUM SULFATE 500 MG/ML
2 VIAL (ML) INJECTION ONCE
Refills: 0 | Status: COMPLETED | OUTPATIENT
Start: 2023-02-28 | End: 2023-02-28

## 2023-02-28 RX ORDER — DIGOXIN 250 MCG
1 TABLET ORAL
Qty: 0 | Refills: 0 | DISCHARGE
Start: 2023-02-28

## 2023-02-28 RX ADMIN — MEMANTINE HYDROCHLORIDE 10 MILLIGRAM(S): 10 TABLET ORAL at 17:20

## 2023-02-28 RX ADMIN — Medication 25 GRAM(S): at 12:13

## 2023-02-28 RX ADMIN — MIDODRINE HYDROCHLORIDE 15 MILLIGRAM(S): 2.5 TABLET ORAL at 05:15

## 2023-02-28 RX ADMIN — Medication 12.5 MILLIGRAM(S): at 17:21

## 2023-02-28 RX ADMIN — QUETIAPINE FUMARATE 25 MILLIGRAM(S): 200 TABLET, FILM COATED ORAL at 17:20

## 2023-02-28 RX ADMIN — QUETIAPINE FUMARATE 25 MILLIGRAM(S): 200 TABLET, FILM COATED ORAL at 05:15

## 2023-02-28 RX ADMIN — APIXABAN 5 MILLIGRAM(S): 2.5 TABLET, FILM COATED ORAL at 05:15

## 2023-02-28 RX ADMIN — Medication 12.5 MILLIGRAM(S): at 05:15

## 2023-02-28 RX ADMIN — MEMANTINE HYDROCHLORIDE 10 MILLIGRAM(S): 10 TABLET ORAL at 05:15

## 2023-02-28 RX ADMIN — Medication 250 MICROGRAM(S): at 05:16

## 2023-02-28 RX ADMIN — MIDODRINE HYDROCHLORIDE 15 MILLIGRAM(S): 2.5 TABLET ORAL at 17:20

## 2023-02-28 RX ADMIN — APIXABAN 5 MILLIGRAM(S): 2.5 TABLET, FILM COATED ORAL at 17:20

## 2023-02-28 NOTE — MEDICAL STUDENT PROGRESS NOTE(EDUCATION) - NS MD HP STUD ASPLAN PLAN FT
#Unwitnessed Fall 2/2 Mechanical vs Syncope  #r/o ACS  #H/o HFrEF  -Head trauma ?, Loss of consciousness ?, Anti-coagulation -ve  -Trauma work up: CT head, cervical spine, chest, abdomen and pelvis shows no acute traumatic event- left renal exophytic mass observed- OP follow up  -continue monitoring on tele  -Fall precaution  -Trops 0.03 -> 0.01  -EKG shows Afib, LVH and wide QRS- same as before  -c/w Eliquis  -Pt recommendation sub acute rehab after d/c    #Shock - possibly cardiogenic  -off levophed since 2/25 PM  -continue midodrine 15mg tid  - PO Digoxin started yesterday  - Continue to Monitor BP  - Encourage PO intake  - Continue holding metoprolol, Entresto, spironolactone, and furosemide  - blood cultures negative, repeat CXR no infiltrate or effusion  - lactate trended down from 2.5  - venous duplex of LE negative  - not candidate for home inotrope per cardiology -> if pt unable to be weaned off pressor, will speak to family re: comfort care, hospice  - Pt's bp more stable, 118/80 this AM, HR 63-80    #Atrial fibrillation   [id number]: 4 (left systolic dysfunction, HTN, age)   - c/w Eliquis  - cont metoprolol    #Severe MVR with systolic CHF- NOT in exacerbation  - Echo 2/23 shows EF <20%  - Patient was supposed to follow OP with cardiology for mitraclip, Not sure if he followed, no OP cards note in HIE  - daily weights      #BPH   - c/w Tamsulosin and Finasteride    #Glaucoma   - cont home eye drops     #Misc  -Routine Lab frequency: Limit routine labs  -DVT prophylaxis: Eliquis  -GI prophylaxis: None  -Diet: DASH, pureed- pending S&S  -Code status: DNR/DNI  -Activity: IAT  -Bowel regimen: Senna, Miralax  -Urinary catheter: Incontinent  -Dispo: Tele    -Med rec confirmed with nursing home charts

## 2023-02-28 NOTE — DISCHARGE NOTE PROVIDER - CARE PROVIDERS DIRECT ADDRESSES
,DirectAddress_Unknown ,DirectAddress_Unknown,[email or web address]

## 2023-02-28 NOTE — DISCHARGE NOTE PROVIDER - NSDCCPCAREPLAN_GEN_ALL_CORE_FT
PRINCIPAL DISCHARGE DIAGNOSIS  Diagnosis: Syncope  Assessment and Plan of Treatment: Syncope is also called fainting or passing out. Syncope is a sudden, temporary loss of consciousness, followed by a fall from a standing or sitting position. A syncope episode is usually short.  Syncope is caused by a decrease in blood flow to the brain. When blood flow to the brain decreases, oxygen to the brain also decreases. Any of the following conditions may cause syncope:  A heart condition, such as a narrow artery or an irregular heartbeat  A medical condition such as severe anemia, uncontrolled diabetes, or a nerve disorder  Dehydration  Certain medicines, such as blood pressure medicines, heart medicines, or antidepressants  Problems with the blood vessels of your brain  A rapid drop in blood pressure after a body position change, such as moving from lying to sitting or standing  Straining during bowel movements, a cough or sneeze, or a stressful or fearful situation  A medical condition that affects your lungs, such as pneumonia or asthma, or hyperventilation (breathing too quicly)  Please follow up with your primary care provider and cardiolosgist in 1-2 weeks following discharge.        SECONDARY DISCHARGE DIAGNOSES  Diagnosis: Fall  Assessment and Plan of Treatment: Fall prevention includes ways to make your home and other areas safer. It also includes ways you can move more carefully to prevent a fall. Health conditions that cause changes in your blood pressure, vision, or muscle strength and coordination may increase your risk for falls. Medicines may also increase your risk for falls if they make you dizzy, weak, or sleepy.  It is likely that your fall was caused by some of the medications that you were on. We have adjusted [facility] medication to try and optimize your condition without overmedicated you in [facility] hope to prevent falls.   Seek Medical Attention If:  You have fallen and are unconscious.  fallen and cannot move part of your body.  You have fallen and have pain or a headache.  Fall prevention tips:  Stand or sit up slowly.  Use assistive devices as directed.   You may need to have grab bars put in your bathroom near the toilet or in the shower.  Wear shoes that fit well and have soles that . Wear shoes both inside and outside. Do not wear shoes with high heels.  Wear a personal alarm that can call 911 in an emergency.   Manage your medical conditions. Keep all appointments with your healthcare providers. Visit your eye doctor as directed.  Home safety tips:   Put nonslip strips on your bath or shower floor.  Use a shower seat. Sit on the toilet or a chair in your bathroom.  Keep paths clear. Remove books, shoes, and other objects from walkways and stairs. Place cords for telephones and lamps out of the way. Remove small rugs or secure it with double-sided tape.  Install bright lights in your home. Use night lights to help light paths to the bathroom or kitchen. Always turn on the light before you start walking.  Keep items you use often on shelves within reach. Do not use a step stool to help you reach an item.  Place reflective tape on the edges of your stairs.      Diagnosis: Chronic atrial fibrillation  Assessment and Plan of Treatment: Atrial Fibrillation  Atrial fibrillation is a type of irregular heartbeat (arrhythmia) where the heart quivers continuously in a chaotic pattern that makes the heart unable to pump blood normally. This can increase the risk for stroke, heart failure, and other heart-related conditions. Atrial fibrillation can be caused by a variety of conditions and may be temporary, intermittent, or permanent. Symptoms include feeling that your heart is beating rapidly or irregularly, chest discomfort, shortness of breath, or dizziness/lightheadedness that may be worse with exertion. Treatment is varied but may involve medication or electrical shock (cardioversion).  SEEK IMMEDIATE MEDICAL CARE IF YOU HAVE ANY OF THE FOLLOWING SYMPTOMS: chest pain, shortness of breath, abdominal pain, sweating, vomiting, blood in vomit/bowel movements/urine, dizziness/lightheadedness, weakness or numbness to face/arm/leg, trouble speaking or understanding, facial droop.  Please follow up with your primary care provider and cardiolosgist in 1-2 weeks following discharge.       PRINCIPAL DISCHARGE DIAGNOSIS  Diagnosis: Syncope  Assessment and Plan of Treatment: Syncope is also called fainting or passing out. Syncope is a sudden, temporary loss of consciousness, followed by a fall from a standing or sitting position. A syncope episode is usually short.  Syncope is caused by a decrease in blood flow to the brain. When blood flow to the brain decreases, oxygen to the brain also decreases. Any of the following conditions may cause syncope:  A heart condition, such as a narrow artery or an irregular heartbeat  A medical condition such as severe anemia, uncontrolled diabetes, or a nerve disorder  Dehydration  Certain medicines, such as blood pressure medicines, heart medicines, or antidepressants  Problems with the blood vessels of your brain  A rapid drop in blood pressure after a body position change, such as moving from lying to sitting or standing  Straining during bowel movements, a cough or sneeze, or a stressful or fearful situation  A medical condition that affects your lungs, such as pneumonia or asthma, or hyperventilation (breathing too quicly)  During your hospital stay, An Echocardiogram showed that you have a very low ejection fraction (20%). An ejection fraction is measure of how well your heart can pump blood to the rest of your body.   Please follow up with your primary care provider and cardiolosgist in 1-2 weeks following discharge. Please continue taking medications as prescribed.        SECONDARY DISCHARGE DIAGNOSES  Diagnosis: Fall  Assessment and Plan of Treatment: Fall prevention includes ways to make your home and other areas safer. It also includes ways you can move more carefully to prevent a fall. Health conditions that cause changes in your blood pressure, vision, or muscle strength and coordination may increase your risk for falls. Medicines may also increase your risk for falls if they make you dizzy, weak, or sleepy.  It is likely that your fall was caused by some of the medications that you were on. We have adjusted [facility] medication to try and optimize your condition without overmedicated you in [facility] hope to prevent falls.   Seek Medical Attention If:  You have fallen and are unconscious.  fallen and cannot move part of your body.  You have fallen and have pain or a headache.  Fall prevention tips:  Stand or sit up slowly.  Use assistive devices as directed.   You may need to have grab bars put in your bathroom near the toilet or in the shower.  Wear shoes that fit well and have soles that . Wear shoes both inside and outside. Do not wear shoes with high heels.  Wear a personal alarm that can call 911 in an emergency.   Manage your medical conditions. Keep all appointments with your healthcare providers. Visit your eye doctor as directed.  Home safety tips:   Put nonslip strips on your bath or shower floor.  Use a shower seat. Sit on the toilet or a chair in your bathroom.  Keep paths clear. Remove books, shoes, and other objects from walkways and stairs. Place cords for telephones and lamps out of the way. Remove small rugs or secure it with double-sided tape.  Install bright lights in your home. Use night lights to help light paths to the bathroom or kitchen. Always turn on the light before you start walking.  Keep items you use often on shelves within reach. Do not use a step stool to help you reach an item.  Place reflective tape on the edges of your stairs.      Diagnosis: Chronic atrial fibrillation  Assessment and Plan of Treatment: Atrial Fibrillation  Atrial fibrillation is a type of irregular heartbeat (arrhythmia) where the heart quivers continuously in a chaotic pattern that makes the heart unable to pump blood normally. This can increase the risk for stroke, heart failure, and other heart-related conditions. Atrial fibrillation can be caused by a variety of conditions and may be temporary, intermittent, or permanent. Symptoms include feeling that your heart is beating rapidly or irregularly, chest discomfort, shortness of breath, or dizziness/lightheadedness that may be worse with exertion. Treatment is varied but may involve medication or electrical shock (cardioversion).  SEEK IMMEDIATE MEDICAL CARE IF YOU HAVE ANY OF THE FOLLOWING SYMPTOMS: chest pain, shortness of breath, abdominal pain, sweating, vomiting, blood in vomit/bowel movements/urine, dizziness/lightheadedness, weakness or numbness to face/arm/leg, trouble speaking or understanding, facial droop.  Please follow up with your primary care provider and cardiolosgist in 1-2 weeks following discharge.       PRINCIPAL DISCHARGE DIAGNOSIS  Diagnosis: Syncope  Assessment and Plan of Treatment: Syncope is also called fainting or passing out. Syncope is a sudden, temporary loss of consciousness, followed by a fall from a standing or sitting position. A syncope episode is usually short.  Syncope is caused by a decrease in blood flow to the brain. When blood flow to the brain decreases, oxygen to the brain also decreases. Any of the following conditions may cause syncope:  A heart condition, such as a narrow artery or an irregular heartbeat  A medical condition such as severe anemia, uncontrolled diabetes, or a nerve disorder  Dehydration  Certain medicines, such as blood pressure medicines, heart medicines, or antidepressants  Problems with the blood vessels of your brain  A rapid drop in blood pressure after a body position change, such as moving from lying to sitting or standing  Straining during bowel movements, a cough or sneeze, or a stressful or fearful situation  A medical condition that affects your lungs, such as pneumonia or asthma, or hyperventilation (breathing too quicly)  During your hospital stay, An Echocardiogram showed that you have a very low ejection fraction (20%). An ejection fraction is measure of how well your heart can pump blood to the rest of your body.   Please follow up with your primary care provider and cardiolosgist in 1-2 weeks following discharge. Please continue taking medications as prescribed.      SECONDARY DISCHARGE DIAGNOSES  Diagnosis: Fall  Assessment and Plan of Treatment: Fall prevention includes ways to make your home and other areas safer. It also includes ways you can move more carefully to prevent a fall. Health conditions that cause changes in your blood pressure, vision, or muscle strength and coordination may increase your risk for falls. Medicines may also increase your risk for falls if they make you dizzy, weak, or sleepy.  It is likely that your fall was caused by some of the medications that you were on. We have adjusted [facility] medication to try and optimize your condition without overmedicated you in [facility] hope to prevent falls.   Seek Medical Attention If:  You have fallen and are unconscious.  fallen and cannot move part of your body.  You have fallen and have pain or a headache.  Fall prevention tips:  Stand or sit up slowly.  Use assistive devices as directed.   You may need to have grab bars put in your bathroom near the toilet or in the shower.  Wear shoes that fit well and have soles that . Wear shoes both inside and outside. Do not wear shoes with high heels.  Wear a personal alarm that can call 911 in an emergency.   Manage your medical conditions. Keep all appointments with your healthcare providers. Visit your eye doctor as directed.  Home safety tips:   Put nonslip strips on your bath or shower floor.  Use a shower seat. Sit on the toilet or a chair in your bathroom.  Keep paths clear. Remove books, shoes, and other objects from walkways and stairs. Place cords for telephones and lamps out of the way. Remove small rugs or secure it with double-sided tape.  Install bright lights in your home. Use night lights to help light paths to the bathroom or kitchen. Always turn on the light before you start walking.  Keep items you use often on shelves within reach. Do not use a step stool to help you reach an item.  Place reflective tape on the edges of your stairs.      Diagnosis: Chronic atrial fibrillation  Assessment and Plan of Treatment: Atrial Fibrillation  Atrial fibrillation is a type of irregular heartbeat (arrhythmia) where the heart quivers continuously in a chaotic pattern that makes the heart unable to pump blood normally. This can increase the risk for stroke, heart failure, and other heart-related conditions. Atrial fibrillation can be caused by a variety of conditions and may be temporary, intermittent, or permanent. Symptoms include feeling that your heart is beating rapidly or irregularly, chest discomfort, shortness of breath, or dizziness/lightheadedness that may be worse with exertion. Treatment is varied but may involve medication or electrical shock (cardioversion).  SEEK IMMEDIATE MEDICAL CARE IF YOU HAVE ANY OF THE FOLLOWING SYMPTOMS: chest pain, shortness of breath, abdominal pain, sweating, vomiting, blood in vomit/bowel movements/urine, dizziness/lightheadedness, weakness or numbness to face/arm/leg, trouble speaking or understanding, facial droop.  Please follow up with your primary care provider and cardiolosgist in 1-2 weeks following discharge.  You were started on a new medication called Digoxin while you were in the hospital to control yur heart rate. Digoxin levels in your blood need to be followed periodically to ensure you are at a therapeutic level. Please tell your Primary care provider and Cardiologist to schedule blood work to measure digoxin levels.   Check with your doctor immediately if any of the following side effects while taking Digoxin:  Dizziness  fainting  fast, pounding, or irregular heartbeat or pulse  slow heartbeat     PRINCIPAL DISCHARGE DIAGNOSIS  Diagnosis: Syncope  Assessment and Plan of Treatment: Syncope is also called fainting or passing out. Syncope is a sudden, temporary loss of consciousness, followed by a fall from a standing or sitting position. A syncope episode is usually short.  Syncope is caused by a decrease in blood flow to the brain. When blood flow to the brain decreases, oxygen to the brain also decreases. Any of the following conditions may cause syncope:  A heart condition, such as a narrow artery or an irregular heartbeat  A medical condition such as severe anemia, uncontrolled diabetes, or a nerve disorder  Dehydration  Certain medicines, such as blood pressure medicines, heart medicines, or antidepressants  Problems with the blood vessels of your brain  A rapid drop in blood pressure after a body position change, such as moving from lying to sitting or standing  Straining during bowel movements, a cough or sneeze, or a stressful or fearful situation  A medical condition that affects your lungs, such as pneumonia or asthma, or hyperventilation (breathing too quicly)  During your hospital stay, An Echocardiogram showed that you have a very low ejection fraction (20%). An ejection fraction is measure of how well your heart can pump blood to the rest of your body.   Please follow up with your primary care provider and cardiolosgist in 1-2 weeks following discharge. Please continue taking medications as prescribed. Titrate midodrine as tolerated      SECONDARY DISCHARGE DIAGNOSES  Diagnosis: Fall  Assessment and Plan of Treatment: Fall prevention includes ways to make your home and other areas safer. It also includes ways you can move more carefully to prevent a fall. Health conditions that cause changes in your blood pressure, vision, or muscle strength and coordination may increase your risk for falls. Medicines may also increase your risk for falls if they make you dizzy, weak, or sleepy.  It is likely that your fall was caused by some of the medications that you were on. We have adjusted [facility] medication to try and optimize your condition without overmedicated you in [facility] hope to prevent falls.   Seek Medical Attention If:  You have fallen and are unconscious.  fallen and cannot move part of your body.  You have fallen and have pain or a headache.  Fall prevention tips:  Stand or sit up slowly.  Use assistive devices as directed.   You may need to have grab bars put in your bathroom near the toilet or in the shower.  Wear shoes that fit well and have soles that . Wear shoes both inside and outside. Do not wear shoes with high heels.  Wear a personal alarm that can call 911 in an emergency.   Manage your medical conditions. Keep all appointments with your healthcare providers. Visit your eye doctor as directed.  Home safety tips:   Put nonslip strips on your bath or shower floor.  Use a shower seat. Sit on the toilet or a chair in your bathroom.  Keep paths clear. Remove books, shoes, and other objects from walkways and stairs. Place cords for telephones and lamps out of the way. Remove small rugs or secure it with double-sided tape.  Install bright lights in your home. Use night lights to help light paths to the bathroom or kitchen. Always turn on the light before you start walking.  Keep items you use often on shelves within reach. Do not use a step stool to help you reach an item.  Place reflective tape on the edges of your stairs.      Diagnosis: Chronic atrial fibrillation  Assessment and Plan of Treatment: Atrial Fibrillation  Atrial fibrillation is a type of irregular heartbeat (arrhythmia) where the heart quivers continuously in a chaotic pattern that makes the heart unable to pump blood normally. This can increase the risk for stroke, heart failure, and other heart-related conditions. Atrial fibrillation can be caused by a variety of conditions and may be temporary, intermittent, or permanent. Symptoms include feeling that your heart is beating rapidly or irregularly, chest discomfort, shortness of breath, or dizziness/lightheadedness that may be worse with exertion. Treatment is varied but may involve medication or electrical shock (cardioversion).  SEEK IMMEDIATE MEDICAL CARE IF YOU HAVE ANY OF THE FOLLOWING SYMPTOMS: chest pain, shortness of breath, abdominal pain, sweating, vomiting, blood in vomit/bowel movements/urine, dizziness/lightheadedness, weakness or numbness to face/arm/leg, trouble speaking or understanding, facial droop.  Please follow up with your primary care provider and cardiolosgist in 1-2 weeks following discharge.  You were started on a new medication called Digoxin while you were in the hospital to control yur heart rate. Digoxin levels in your blood need to be followed periodically to ensure you are at a therapeutic level. Please tell your Primary care provider and Cardiologist to schedule blood work to measure digoxin levels.   Check with your doctor immediately if any of the following side effects while taking Digoxin:  Dizziness  fainting  fast, pounding, or irregular heartbeat or pulse  slow heartbeat

## 2023-02-28 NOTE — DISCHARGE NOTE PROVIDER - CARE PROVIDER_API CALL
Elfego Funez  INTERNAL MEDICINE  [address]  [place] [address]  Phone: [phone]  Fax: [phone]  Follow Up Time: 1 week   Elfego Funez  INTERNAL MEDICINE  [address] [facility] [facility]  [place] [address]  Phone: [phone]  Fax: [phone]  Follow Up Time: 1 week    Estiven Fuller)  Cardiovascular Disease; Internal Medicine  [address] [place] [address]  Phone: [phone]  Fax: [phone]  Follow Up Time: 1 week

## 2023-02-28 NOTE — MEDICAL STUDENT PROGRESS NOTE(EDUCATION) - SUBJECTIVE AND OBJECTIVE BOX
GORDON MERINO  87y  Male    Subjective:  Patient is an 87-year-old male with pmhx significant for hypertension, dyslipidemia, CAD, CHF, CKD stage III, BPH, Alzheimer's dementia, anxiety and A-fib on Eliquis presents for unwitnessed fall. Fall was unwitnessed, patient was found on the floor and started complaining of back pain, for which patient was referred to ED for further work up.     INTERVAL HPI/OVERNIGHT EVENTS: Pt no longer on 1:1 observation. Resting comfortably in bed. Pt conversant, but remains A&O only to self. No acute distress or overnight events.    Objective:  PAST MEDICAL & SURGICAL HISTORY:  Alzheimer disease  Glaucoma  Enlarged prostate  Hyperlipidemia  History of hernia repair  History of cataract surgery    Vitals:  Vital Signs Last 24 Hrs  T(C): 35.6 (28 Feb 2023 04:55), Max: 35.8 (27 Feb 2023 13:17)  T(F): 96 (28 Feb 2023 04:55), Max: 96.5 (27 Feb 2023 13:17)  HR: 90 (28 Feb 2023 04:55) (82 - 90)  BP: 159/64 (28 Feb 2023 04:55) (82/56 - 159/64)  BP(mean): --  RR: 18 (28 Feb 2023 04:55) (18 - 18)  SpO2: --      MEDICATIONS  (STANDING):  apixaban 5 milliGRAM(s) Oral every 12 hours  digoxin     Tablet 250 MICROGram(s) Oral daily  donepezil 10 milliGRAM(s) Oral at bedtime  finasteride 5 milliGRAM(s) Oral daily  latanoprost 0.005% Ophthalmic Solution 1 Drop(s) Both EYES at bedtime  memantine 10 milliGRAM(s) Oral two times a day  metoprolol tartrate 12.5 milliGRAM(s) Oral two times a day  midodrine. 15 milliGRAM(s) Oral three times a day  polyethylene glycol 3350 17 Gram(s) Oral daily  QUEtiapine 25 milliGRAM(s) Oral two times a day  senna 2 Tablet(s) Oral at bedtime  tamsulosin 0.4 milliGRAM(s) Oral at bedtime    MEDICATIONS  (PRN):    PHYSICAL EXAM:  GENERAL: NAD, well-groomed, well-developed  HEAD:  Atraumatic, Normocephalic  NERVOUS SYSTEM:  Alert & Oriented X1, Pt alert only to self,   HEART: Heart rate irregularly irregular on exam. S1, S2, No murmurs, rubs, or gallops  ABDOMEN: Soft, Nontender, Nondistended; Bowel sounds present  EXTREMITIES:  2+ Peripheral Pulses, No clubbing, cyanosis, or edema    LABS:                                  12.6   6.19  )-----------( 158      ( 27 Feb 2023 05:11 )             38.9   02-27  137  |  104  |  12  ----------------------------<  95  4.0   |  25  |  1.0  Ca    9.0      27 Feb 2023 05:11  Mg     1.8     02-27  TPro  5.7<L>  /  Alb  3.0<L>  /  TBili  0.5  /  DBili  x   /  AST  16  /  ALT  13  /  AlkPhos  66  02-27    RADIOLOGY & ADDITIONAL TESTS:    20-Feb-2023 10:04, CT Head No Cont  CT Head No Cont:   	ACC: [id number] EXAM:  CT CERVICAL SPINE   ORDERED BY: ALVA TAVARES   	ACC: [id number] EXAM:  CT BRAIN   ORDERED BY: ALVA TAVARES   	PROCEDURE DATE:  02/20/2023    	INTERPRETATION:  CLINICAL INDICATION: Trauma  	CT BRAIN:  	TECHNIQUE:  Multiple contiguous axial images were obtained from the skull   	base to the vertex without the use of intravenous contrast. Reformatted   	coronal and sagittal images were subsequently obtained and reviewed.  	COMPARISON EXAMINATION: 12/28/2022  	FINDINGS:  	There is no CT evidence of acute transcortical infarct. Age-related   	involutional changes and chronic microvascular ischemic changes.  	  	There is no hydrocephalus, mass effect, midline shift, or acute   	intracranial hemorrhage. No extra-axial collection. Basal cisterns are   	patent.  	  	The visualized paranasal sinuses and mastoid air cells are clear.  	  	The calvarium is intact.  	  	Bilateral cataract surgery  	  	CT CERVICAL SPINE:  	TECHNIQUE:  Axial images were obtained through the cervicalspine using   	multislice helical technique.  Reformatted coronal and sagittal images   	were subsequently obtained and reviewed.  	COMPARISON EXAMINATION:  12/17/2021  	FINDINGS:  	There is no prevertebral soft tissue swelling. There is no splaying of   	the spinous processes. The occipital condyles are normal. Lateral masses   	of C1 align normally with C2. The atlantodental and atlanto-occipital   	joints are maintained. No lucent fracture line is identified. There is no   	spondylolisthesis. Facetjoint alignments are maintained.  	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 Findings include marginal osteophytes, uncovertebral spurring,   	loss of normal disc space height and endplate sclerosis, and facet joint   	space compartment narrowing with subchondral sclerosis and hypertrophic   	osteophytes at multiple levels. Canal contents are suboptimally evaluated   	inherent to CT technique.  	IMPRESSION:  	CT head: No evidence of acute transcortical infarct, acute intracranial   	hemorrhage or mass effect.  	CT cervical spine: No acute fracture or traumatic subluxation.  	  	--- End of Report ---  	CARL ANTONIO MD; Attending Radiologist  	This document has been electronically signed. Feb 20 202310:56AM    20-Feb-2023 10:09, CT Cervical Spine No Cont  CT Cervical Spine No Cont:   	ACC: [id number] EXAM:  CT CERVICAL SPINE   ORDERED BY: ALVA TAVARES   	ACC: [id number] EXAM:  CT BRAIN   ORDERED BY: ALVA TAVARES   	PROCEDURE DATE:  02/20/2023    	INTERPRETATION:  CLINICAL INDICATION: Trauma  	CT BRAIN:  	  	TECHNIQUE:  Multiple contiguous axial images were obtained from the skull   	base to the vertex without the use of intravenous contrast. Reformatted   	coronal and sagittal images were subsequently obtained and reviewed.  	COMPARISON EXAMINATION: 12/28/2022  	FINDINGS:  	There is no CT evidence of acute transcortical infarct. Age-related   	involutional changes and chronic microvascular ischemic changes.  	There is no hydrocephalus, mass effect, midline shift, or acute   	intracranial hemorrhage. No extra-axial collection. Basal cisterns are   	patent.  	The visualized paranasal sinuses and mastoid air cells are clear.  	The calvarium is intact.  	Bilateral cataract surgery.  	CT CERVICAL SPINE:  	TECHNIQUE:  Axial images were obtained through the cervicalspine using   	multislice helical technique.  Reformatted coronal and sagittal images   	were subsequently obtained and reviewed.  	COMPARISON EXAMINATION:  12/17/2021  	FINDINGS:  	There is no prevertebral soft tissue swelling. There is no splaying of   	the spinous processes. The occipital condyles are normal. Lateral masses   	of C1 align normally with C2. The atlantodental and atlanto-occipital   	joints are maintained. No lucent fracture line is identified. There is no   	spondylolisthesis. Facetjoint alignments are maintained.  	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 Findings include marginal osteophytes, uncovertebral spurring,   	loss of normal disc space height and endplate sclerosis, and facet joint   	space compartment narrowing with subchondral sclerosis and hypertrophic   	osteophytes at multiple levels. Canal contents are suboptimally evaluated   	inherent to CT technique.  	IMPRESSION:  	CT head: No evidence of acute transcortical infarct, acute intracranial   	hemorrhage or mass effect.  	CT cervical spine: No acute fracture or traumatic subluxation.  	--- End of Report ---  	CARL ANTONIO MD; Attending Radiologist  	This document has been electronically signed. Feb 20 202310:56AM  X-Ray, Fluoroscopy:    20-Feb-2023 10:04, CT Head No Cont  PACS Image: Image(s) Available    20-Feb-2023 10:09, CT Cervical Spine No Cont  PACS Image: Image(s) Available    20-Feb-2023 10:15, Xray Chest 1 View AP/PA  PACS Image: Image(s) Available  Xray Chest 1 View AP/PA:   	ACC: [id number] EXAM:  XR CHEST 1 VIEW   [facility]ED BY: [email or web address]   	PROCEDURE DATE:  02/20/2023    	INTERPRETATION:  STUDY INDICATION: Trauma Code  	Comparison: 1/5/2023.  	Technique/Positioning: Frontal view of the chest were obtained.  	Findings:  	Support devices: None.  	Cardiac/mediastinum/hilum: Within normal limits.  	Lung parenchyma/Pleura: No consolidation, effusion or pneumothorax. Right   	apical nodular opacity.  	Skeleton/soft tissues:  No radiographically evident acutedisplaced   	fracture within the limitations of this exam.  	Impression:  	No acute abnormality on frontal chest radiograph.  	--- End of Report ---  	MAMIE TAYLOR MD; Attending Radiologist  	This document has been electronically signed. Feb20 2023  2:00PM    20-Feb-2023 10:16, Xray Pelvis AP only  PACS Image: Image(s) Available  Xray Pelvis AP only:   	ACC: [id number] EXAM:  XR PELVIS AP ONLY 1-2 VIEWS   ORDERED BY: [email or web address]   	PROCEDURE DATE:  02/20/2023    	INTERPRETATION:  Clinical History / Reason for exam: Trauma  	Technique: Single view of AP pelvis obtained.  	Comparison: Radiograph dated 12/16/2021  	Findings/  	impression  	Bony demineralization. No definitive evidence of acute displaced   	fracture. Contrast in the left ureter and bladder.  	--- End of Report ---  	SHALONDA CUEVAS MD; Attending Radiologist  	This document has been electronically signed. Feb 20 2023 10:29AM

## 2023-02-28 NOTE — DISCHARGE NOTE NURSING/CASE MANAGEMENT/SOCIAL WORK - PATIENT PORTAL LINK FT
You can access the FollowMyHealth Patient Portal offered by [facility] by registering at the following website: [email or web address]. By joining [employer]’s FollowMyHealth portal, you will also be able to view your health information using other applications (apps) compatible with our system.

## 2023-02-28 NOTE — DISCHARGE NOTE PROVIDER - NSDCMRMEDTOKEN_GEN_ALL_CORE_FT
apixaban 5 mg oral tablet: 1 tab(s) orally 2 times a day   docusate potassium 100 mg oral capsule: 1 cap(s) orally every other day  donepezil 10 mg oral tablet: 1 tab(s) orally once a day (at bedtime)  finasteride 5 mg oral tablet: 1 tab(s) orally once a day  latanoprost 0.005% ophthalmic solution: 1 drop(s) to each affected eye once a day (in the evening)  memantine 10 mg oral tablet: 1 tab(s) orally 2 times a day  Myrbetriq 50 mg oral tablet, extended release: 1 tab(s) orally once a day  QUEtiapine 25 mg oral tablet: 1 tab(s) orally 2 times a day  silodosin 8 mg oral capsule: 1 cap(s) orally once a day   apixaban 5 mg oral tablet: 1 tab(s) orally 2 times a day   docusate potassium 100 mg oral capsule: 1 cap(s) orally every other day  donepezil 10 mg oral tablet: 1 tab(s) orally once a day (at bedtime)  finasteride 5 mg oral tablet: 1 tab(s) orally once a day  latanoprost 0.005% ophthalmic solution: 1 drop(s) to each affected eye once a day (in the evening)  memantine 10 mg oral tablet: 1 tab(s) orally 2 times a day  Metoprolol Tartrate 25 mg oral tablet: 1 tab(s) orally once a day   Myrbetriq 50 mg oral tablet, extended release: 1 tab(s) orally once a day  QUEtiapine 25 mg oral tablet: 1 tab(s) orally 2 times a day  silodosin 8 mg oral capsule: 1 cap(s) orally once a day   apixaban 5 mg oral tablet: 1 tab(s) orally 2 times a day   digoxin 250 mcg (0.25 mg) oral tablet: 1 tab(s) orally once a day  docusate potassium 100 mg oral capsule: 1 cap(s) orally every other day  donepezil 10 mg oral tablet: 1 tab(s) orally once a day (at bedtime)  finasteride 5 mg oral tablet: 1 tab(s) orally once a day  latanoprost 0.005% ophthalmic solution: 1 drop(s) to each affected eye once a day (in the evening)  memantine 10 mg oral tablet: 1 tab(s) orally 2 times a day  Metoprolol Tartrate 25 mg oral tablet: 1 tab(s) orally once a day   midodrine 5 mg oral tablet: 3 tab(s) orally 3 times a day  Myrbetriq 50 mg oral tablet, extended release: 1 tab(s) orally once a day  QUEtiapine 25 mg oral tablet: 1 tab(s) orally 2 times a day  silodosin 8 mg oral capsule: 1 cap(s) orally once a day   apixaban 5 mg oral tablet: 1 tab(s) orally 2 times a day   digoxin 250 mcg (0.25 mg) oral tablet: 1 tab(s) orally once a day  docusate potassium 100 mg oral capsule: 1 cap(s) orally every other day  donepezil 10 mg oral tablet: 1 tab(s) orally once a day (at bedtime)  finasteride 5 mg oral tablet: 1 tab(s) orally once a day  latanoprost 0.005% ophthalmic solution: 1 drop(s) to each affected eye once a day (in the evening)  memantine 10 mg oral tablet: 1 tab(s) orally 2 times a day  Metoprolol Tartrate 25 mg oral tablet: 1 tab(s) orally 2 times a day  midodrine 5 mg oral tablet: 3 tab(s) orally 3 times a day  Myrbetriq 50 mg oral tablet, extended release: 1 tab(s) orally once a day  QUEtiapine 25 mg oral tablet: 1 tab(s) orally 2 times a day  silodosin 8 mg oral capsule: 1 cap(s) orally once a day

## 2023-02-28 NOTE — PROGRESS NOTE ADULT - ASSESSMENT
86 y/o man with PMH of HTN, hyperlipidemia, ?CAD, HFrEF, CKD 3, BPH, Alzheimer's dementia, Afib recently diagnosed in 12/22 and on Eliquis and anxiety presented to the ED from the NH for an unwitnessed fall. Pt is an unreliable historian due to dementia. Per chart, pt was found on the floor and complained of back pain and brought to the ED.     1. s/p unwitnessed fall - mechanical vs syncope  fall precautions  staff unable to obtain orthostatics while standing on 2/21  pt was hypotensive on 11/22 and started on midodrine 10mg tid and given bolus of 250cc x 2  furosemide stopped  metoprolol decreased to 25mg q12hr - hold for SBP <90  on 11/23, SBP >100 and he was given metoprolol and Entresto and midodrine -> SBP in the 70s and did not improve after 2 fluid boluses  Entresto stopped  levophed started   YANN stockings  resumed PT - pt now off levophed and BP is stable  trop 0.03 -> 0.01  EKG reviewed and interpreted by me: Afib with vent rate 99 and LAD  ECHO 12/22: EF 27%, severe MR in Dec 2022 and plan for outpt eval for Mitraclip  ECHO 2/23/23: EF<20%, severely reduced RV function, now mild MR    2. Shock - possibly cardiogenic  levophed now off since 2/25 PM  continue midodrine 15mg tid and can be titrated as outpt  cardiology consult appreciated  encourage PO intake  holding Entresto, spironolactone, furosemide  no evidence of infection at this time: blood cultures negative and repeat CXR no infiltrate or effusion  lactate trended down from 2.5  venous duplex of LE negative  not candidate for home inotrope per cardiology    3. Chronic Afib   on Eliquis  digoxin started and level on 2/28 is 1.7  metoprolol 25mg bid on discharge    4. Chronic HFrEF - pt is not volume overloaded at this time  holding lasix and Entresto for now due to hypotension and re-evaluate as outpt   repeat ECHO report as above    5. CKD stage 3 -  stable at baseline    6. BPH - on flomax and finasteride  Glaucoma - on home eye drops     DNR/DNI status  overall poor prognosis due to advanced stage heart failure - not candidate for inotrope therapy per cardiology  case discussed with pt's daughter Bhavani Parks this admission who is aware of the prognosis  pt would be appropriate candidate for hospice if his condition worsens  Disposition: STR at [facility] today      med reconciliation and discharge papers reviewed by me    spent 45 minutes on the discharge process of this pt

## 2023-02-28 NOTE — DISCHARGE NOTE PROVIDER - HOSPITAL COURSE
87-year-old male past medical history significant for hypertension, dyslipidemia, CAD, CHF, CKD stage III, BPH, Alzheimer's dementia, anxiety and [facility]. Fall was unwitnessed, patient was found on the floor and started complaining of back pain, for which patient was referred to ED for further work up. Although patient has dementia but he was denying any complaints at time of my interview. SNF staff endorsed that most likely patient was trying to get out of bed and then fell and was found on his back.    In the ED, imaging showed no acute traumatic events.    Vital Signs Last 24 Hrs:  T(F): 97.7 (20 Feb 2023 09:44), Max: 97.7 (20 Feb 2023 09:44)  HR: 86 (20 Feb 2023 09:23) (86 - 86)  BP: 114/59 (20 Feb 2023 09:23) (114/59 - 114/59)  RR: 18 (20 Feb 2023 09:23) (18 - 18)  SpO2: 100% (20 Feb 2023 09:23) (100% - 100%) on RA    #Unwitnessed Fall 2/2 Mechanical vs Syncope  #r/o ACS  #H/o HFrEF  -Head trauma ?, Loss of consciousness ?, Anti-coagulation -ve  -Trauma work up: CT head, cervical spine, chest, abdomen and pelvis shows no acute traumatic event- left renal exophytic mass observed- OP follow up  -continue monitoring on tele  -Fall precaution  -Trops 0.03 -> 0.01  -EKG shows Afib, LVH and wide QRS- same as before  -c/w Eliquis  -Pt recommendation sub acute rehab after d/c    #Shock - possibly cardiogenic  -off levophed since 2/25 PM  -continue midodrine 15mg tid  - PO Digoxin started yesterday  - Continue to Monitor BP  - Encourage PO intake  - Continue holding metoprolol, Entresto, spironolactone, and furosemide  - blood cultures negative, repeat CXR no infiltrate or effusion  - lactate trended down from 2.5  - venous duplex of LE negative  - not candidate for home inotrope per cardiology -> if pt unable to be weaned off pressor, will speak to family re: comfort care, hospice  - Pt's bp more stable, 118/80 this AM, HR 63-80    #Atrial fibrillation   [id number]: 4 (left systolic dysfunction, HTN, age)   - c/w Eliquis  - cont metoprolol    #Severe MVR with systolic CHF- NOT in exacerbation  - Echo 2/23 shows EF <20%  - Patient was supposed to follow OP with cardiology for mitraclip, Not sure if he followed, no OP cards note in HIE  - daily weights      #BPH   - c/w Tamsulosin and Finasteride    #Glaucoma   - cont home eye drops     #Misc  -Routine Lab frequency: Limit routine labs  -DVT prophylaxis: Eliquis  -GI prophylaxis: None  -Diet: DASH, pureed- pending S&S  -Code status: DNR/DNI  -Activity: IAT  -Bowel regimen: Senna, Miralax  -Urinary catheter: Incontinent  -Dispo: Tele     87-year-old male past medical history significant for hypertension, dyslipidemia, CAD, CHF, CKD stage III, BPH, Alzheimer's dementia, anxiety and [facility]. Fall was unwitnessed, patient was found on the floor and started complaining of back pain, for which patient was referred to ED for further work up. Although patient has dementia but he was denying any complaints at time of my interview. SNF staff endorsed that most likely patient was trying to get out of bed and then fell and was found on his back.    In the ED, imaging showed no acute traumatic events.    Vital Signs Last 24 Hrs:  T(F): 97.7 (20 Feb 2023 09:44), Max: 97.7 (20 Feb 2023 09:44)  HR: 86 (20 Feb 2023 09:23) (86 - 86)  BP: 114/59 (20 Feb 2023 09:23) (114/59 - 114/59)  RR: 18 (20 Feb 2023 09:23) (18 - 18)  SpO2: 100% (20 Feb 2023 09:23) (100% - 100%) on RA    #Unwitnessed Fall 2/2 Mechanical vs Syncope  #r/o ACS  #H/o HFrEF  -Head trauma ?, Loss of consciousness ?, Anti-coagulation -ve  -Trauma work up: CT head, cervical spine, chest, abdomen and pelvis shows no acute traumatic event- left renal exophytic mass observed- OP follow up  -continue monitoring on tele  -Fall precaution  -Trops 0.03 -> 0.01  -EKG shows Afib, LVH and wide QRS- same as before  -c/w Eliquis  -Pt recommendation sub acute rehab after d/c    #Shock - possibly cardiogenic  -off levophed since 2/25 PM  -continue midodrine 15mg tid  - PO Digoxin started yesterday  - Continue to Monitor BP  - Encourage PO intake  - Continue holding metoprolol, Entresto, spironolactone, and furosemide  - blood cultures negative, repeat CXR no infiltrate or effusion  - lactate trended down from 2.5  - venous duplex of LE negative  - not candidate for home inotrope per cardiology -> if pt unable to be weaned off pressor, speak to family re: hospice  - Pt's bp more stable, 118/80 this AM, HR 63-80    #Atrial fibrillation   [id number]: 4 (left systolic dysfunction, HTN, age)   - c/w Eliquis  - cont metoprolol    #Severe MVR with systolic CHF- NOT in exacerbation  - Echo 2/23 shows EF <20%  - Patient was supposed to follow OP with cardiology for mitraclip, Not sure if he followed, no OP cards note in HIE  - daily weights      #BPH   - c/w Tamsulosin and Finasteride    #Glaucoma   - cont home eye drops     #Misc  -Routine Lab frequency: Limit routine labs  -DVT prophylaxis: Eliquis  -GI prophylaxis: None  -Diet: DASH, pureed- pending S&S  -Code status: DNR/DNI  -Activity: IAT  -Bowel regimen: Senna, Miralax  -Urinary catheter: Incontinent  -Dispo: Tele

## 2023-02-28 NOTE — DISCHARGE NOTE NURSING/CASE MANAGEMENT/SOCIAL WORK - NSDCPEFALRISK_GEN_ALL_CORE
For information on Fall & Injury Prevention, visit: [email or web address] OR  [email or web address] OR  https://www.cdc.gov/steadi/patient.html

## 2023-02-28 NOTE — DISCHARGE NOTE PROVIDER - PROVIDER TOKENS
PROVIDER:[TOKEN:[[id number]],FOLLOWUP:[1 week]] PROVIDER:[TOKEN:[[id number]],FOLLOWUP:[1 week]],PROVIDER:[TOKEN:[[id number]],FOLLOWUP:[1 week]]

## 2023-03-01 LAB
CULTURE RESULTS: SIGNIFICANT CHANGE UP
SPECIMEN SOURCE: SIGNIFICANT CHANGE UP

## 2023-03-06 DIAGNOSIS — I48.20 CHRONIC ATRIAL FIBRILLATION, UNSPECIFIED: ICD-10-CM

## 2023-03-06 DIAGNOSIS — Z79.01 LONG TERM (CURRENT) USE OF ANTICOAGULANTS: ICD-10-CM

## 2023-03-06 DIAGNOSIS — N17.9 ACUTE KIDNEY FAILURE, UNSPECIFIED: ICD-10-CM

## 2023-03-06 DIAGNOSIS — R57.0 CARDIOGENIC SHOCK: ICD-10-CM

## 2023-03-06 DIAGNOSIS — H40.9 UNSPECIFIED GLAUCOMA: ICD-10-CM

## 2023-03-06 DIAGNOSIS — D68.32 HEMORRHAGIC DISORDER DUE TO EXTRINSIC CIRCULATING ANTICOAGULANTS: ICD-10-CM
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
## 2023-03-06 DIAGNOSIS — I25.10 ATHEROSCLEROTIC HEART DISEASE OF NATIVE CORONARY ARTERY WITHOUT ANGINA PECTORIS: ICD-10-CM

## 2023-03-06 DIAGNOSIS — N40.0 BENIGN PROSTATIC HYPERPLASIA WITHOUT LOWER URINARY TRACT SYMPTOMS: ICD-10-CM

## 2023-03-06 DIAGNOSIS — N18.30 CHRONIC KIDNEY DISEASE, STAGE 3 UNSPECIFIED: ICD-10-CM

## 2023-03-06 DIAGNOSIS — Z66 DO NOT RESUSCITATE: ICD-10-CM

## 2023-03-06 DIAGNOSIS — T45.515A ADVERSE EFFECT OF ANTICOAGULANTS, INITIAL ENCOUNTER: ICD-10-CM

## 2023-03-06 DIAGNOSIS — I50.810 RIGHT HEART FAILURE, UNSPECIFIED: ICD-10-CM

## 2023-03-06 DIAGNOSIS — I13.0 HYPERTENSIVE HEART AND CHRONIC KIDNEY DISEASE WITH HEART FAILURE AND STAGE 1 THROUGH STAGE 4 CHRONIC KIDNEY DISEASE, OR UNSPECIFIED CHRONIC KIDNEY DISEASE: ICD-10-CM

## 2023-03-06 DIAGNOSIS — I50.22 CHRONIC SYSTOLIC (CONGESTIVE) HEART FAILURE: ICD-10-CM

## 2023-03-06 DIAGNOSIS — I34.1 NONRHEUMATIC MITRAL (VALVE) PROLAPSE: ICD-10-CM

## 2023-03-06 DIAGNOSIS — F02.84 DEMENTIA IN OTHER DISEASES CLASSIFIED ELSEWHERE, UNSPECIFIED SEVERITY, WITH ANXIETY: ICD-10-CM

## 2023-03-06 DIAGNOSIS — G30.9 ALZHEIMER'S DISEASE, UNSPECIFIED: ICD-10-CM

## 2023-03-06 DIAGNOSIS — D49.512 NEOPLASM OF UNSPECIFIED BEHAVIOR OF LEFT KIDNEY: ICD-10-CM

## 2023-03-06 DIAGNOSIS — I34.0 NONRHEUMATIC MITRAL (VALVE) INSUFFICIENCY: ICD-10-CM

## 2023-03-06 DIAGNOSIS — R55 SYNCOPE AND COLLAPSE: ICD-10-CM

## 2023-03-06 DIAGNOSIS — S09.90XA UNSPECIFIED INJURY OF HEAD, INITIAL ENCOUNTER: ICD-10-CM

## 2023-03-16 NOTE — MEDICAL STUDENT PROGRESS NOTE(EDUCATION) - NS MD HP STUD ASPLAN ASSES FT
87-year-old male past medical history significant for hypertension, dyslipidemia, CAD, CHF, CKD stage III, BPH, Alzheimer's dementia, anxiety and A-fib on Eliquis presents for unwitnessed fall. Fall was unwitnessed, patient was found on the floor and started complaining of back pain, for which patient was referred to ED for further work up. Although patient has dementia he was denying any complaints at time of my interview. SNF staff endorsed that most likely patient was trying to get out of bed and then fell and was found on his back.
87-year-old male past medical history significant for hypertension, dyslipidemia, CAD, CHF, CKD stage III, BPH, Alzheimer's dementia, anxiety and A-fib on Eliquis presents for unwitnessed fall. Fall was unwitnessed, patient was found on the floor and started complaining of back pain, for which patient was referred to ED for further work up. Although patient has dementia but he was denying any complaints at time of my interview. SNF staff endorsed that most likely patient was trying to get out of bed and then fell and was found on his back.
87-year-old male past medical history significant for hypertension, dyslipidemia, CAD, CHF, CKD stage III, BPH, Alzheimer's dementia, anxiety and A-fib on Eliquis presents for unwitnessed fall. Fall was unwitnessed, patient was found on the floor and started complaining of back pain, for which patient was referred to ED for further work up. Although patient has dementia but he was denying any complaints at time of my interview. SNF staff endorsed that most likely patient was trying to get out of bed and then fell and was found on his back.
87-year-old male past medical history significant for hypertension, dyslipidemia, CAD, CHF, CKD stage III, BPH, Alzheimer's dementia, anxiety and A-fib on Eliquis presents for unwitnessed fall. Fall was unwitnessed, patient was found on the floor and started complaining of back pain, for which patient was referred to ED for further work up. Although patient has dementia he was denying any complaints at time of my interview. SNF staff endorsed that most likely patient was trying to get out of bed and then fell and was found on his back.
Smokes 1 PPD and vapes daily

## 2025-01-01 NOTE — ED PROVIDER NOTE - NS ED ROS FT
I am unable to obtain a comprehensive history, review of systems, past medical history, and/or physical exam due to constraints imposed by the urgency of the patient's clinical condition and/or mental status. Warm